# Patient Record
Sex: FEMALE | Race: WHITE | NOT HISPANIC OR LATINO
[De-identification: names, ages, dates, MRNs, and addresses within clinical notes are randomized per-mention and may not be internally consistent; named-entity substitution may affect disease eponyms.]

---

## 2017-02-13 ENCOUNTER — APPOINTMENT (OUTPATIENT)
Dept: RHEUMATOLOGY | Facility: CLINIC | Age: 52
End: 2017-02-13

## 2017-07-18 ENCOUNTER — OUTPATIENT (OUTPATIENT)
Dept: OUTPATIENT SERVICES | Facility: HOSPITAL | Age: 52
LOS: 1 days | End: 2017-07-18
Payer: COMMERCIAL

## 2017-07-18 ENCOUNTER — APPOINTMENT (OUTPATIENT)
Dept: RADIOLOGY | Facility: CLINIC | Age: 52
End: 2017-07-18

## 2017-07-18 DIAGNOSIS — Z00.8 ENCOUNTER FOR OTHER GENERAL EXAMINATION: ICD-10-CM

## 2017-07-18 PROCEDURE — 71046 X-RAY EXAM CHEST 2 VIEWS: CPT

## 2018-03-11 ENCOUNTER — APPOINTMENT (OUTPATIENT)
Dept: CT IMAGING | Facility: CLINIC | Age: 53
DRG: 114 | End: 2018-03-11
Payer: MEDICAID

## 2018-03-11 ENCOUNTER — HOSPITAL ENCOUNTER (INPATIENT)
Age: 53
LOS: 2 days | Discharge: HOME | DRG: 114 | End: 2018-03-13
Attending: SPECIALIST | Admitting: INTERNAL MEDICINE
Payer: MEDICAID

## 2018-03-11 DIAGNOSIS — L03.213 PRESEPTAL CELLULITIS OF LEFT LOWER EYELID: ICD-10-CM

## 2018-03-11 DIAGNOSIS — K04.7 DENTAL ABSCESS: Primary | ICD-10-CM

## 2018-03-11 PROBLEM — L02.91 ABSCESS: Status: ACTIVE | Noted: 2018-03-11

## 2018-03-11 LAB
ABSOLUTE EOS #: 0 K/UL (ref 0–0.4)
ABSOLUTE IMMATURE GRANULOCYTE: ABNORMAL K/UL (ref 0–0.3)
ABSOLUTE LYMPH #: 1.7 K/UL (ref 1–4.8)
ABSOLUTE MONO #: 0.7 K/UL (ref 0.1–1.2)
ANION GAP SERPL CALCULATED.3IONS-SCNC: 12 MMOL/L (ref 9–17)
BASOPHILS # BLD: 0 % (ref 0–2)
BASOPHILS ABSOLUTE: 0 K/UL (ref 0–0.2)
BUN BLDV-MCNC: 7 MG/DL (ref 6–20)
BUN/CREAT BLD: ABNORMAL (ref 9–20)
CALCIUM SERPL-MCNC: 9.3 MG/DL (ref 8.6–10.4)
CHLORIDE BLD-SCNC: 98 MMOL/L (ref 98–107)
CO2: 30 MMOL/L (ref 20–31)
CREAT SERPL-MCNC: 0.5 MG/DL (ref 0.5–0.9)
DIFFERENTIAL TYPE: ABNORMAL
EOSINOPHILS RELATIVE PERCENT: 0 % (ref 1–4)
GFR AFRICAN AMERICAN: >60 ML/MIN
GFR NON-AFRICAN AMERICAN: >60 ML/MIN
GFR SERPL CREATININE-BSD FRML MDRD: ABNORMAL ML/MIN/{1.73_M2}
GFR SERPL CREATININE-BSD FRML MDRD: ABNORMAL ML/MIN/{1.73_M2}
GLUCOSE BLD-MCNC: 119 MG/DL (ref 70–99)
HCT VFR BLD CALC: 42.7 % (ref 36–46)
HEMOGLOBIN: 14.2 G/DL (ref 12–16)
IMMATURE GRANULOCYTES: ABNORMAL %
LYMPHOCYTES # BLD: 19 % (ref 24–44)
MCH RBC QN AUTO: 32 PG (ref 26–34)
MCHC RBC AUTO-ENTMCNC: 33.4 G/DL (ref 31–37)
MCV RBC AUTO: 95.8 FL (ref 80–100)
MONOCYTES # BLD: 8 % (ref 2–11)
NRBC AUTOMATED: ABNORMAL PER 100 WBC
PDW BLD-RTO: 13.3 % (ref 12.5–15.4)
PLATELET # BLD: 249 K/UL (ref 140–450)
PLATELET ESTIMATE: ABNORMAL
PMV BLD AUTO: 8 FL (ref 6–12)
POTASSIUM SERPL-SCNC: 4 MMOL/L (ref 3.7–5.3)
RBC # BLD: 4.46 M/UL (ref 4–5.2)
RBC # BLD: ABNORMAL 10*6/UL
SEG NEUTROPHILS: 73 % (ref 36–66)
SEGMENTED NEUTROPHILS ABSOLUTE COUNT: 6.5 K/UL (ref 1.8–7.7)
SODIUM BLD-SCNC: 140 MMOL/L (ref 135–144)
WBC # BLD: 9 K/UL (ref 3.5–11)
WBC # BLD: ABNORMAL 10*3/UL

## 2018-03-11 PROCEDURE — 6360000002 HC RX W HCPCS: Performed by: EMERGENCY MEDICINE

## 2018-03-11 PROCEDURE — 96376 TX/PRO/DX INJ SAME DRUG ADON: CPT

## 2018-03-11 PROCEDURE — 1200000000 HC SEMI PRIVATE

## 2018-03-11 PROCEDURE — 96375 TX/PRO/DX INJ NEW DRUG ADDON: CPT

## 2018-03-11 PROCEDURE — 2500000003 HC RX 250 WO HCPCS: Performed by: SPECIALIST

## 2018-03-11 PROCEDURE — 2580000003 HC RX 258: Performed by: INTERNAL MEDICINE

## 2018-03-11 PROCEDURE — G0378 HOSPITAL OBSERVATION PER HR: HCPCS

## 2018-03-11 PROCEDURE — 6370000000 HC RX 637 (ALT 250 FOR IP): Performed by: SPECIALIST

## 2018-03-11 PROCEDURE — 96365 THER/PROPH/DIAG IV INF INIT: CPT

## 2018-03-11 PROCEDURE — 85025 COMPLETE CBC W/AUTO DIFF WBC: CPT

## 2018-03-11 PROCEDURE — 80048 BASIC METABOLIC PNL TOTAL CA: CPT

## 2018-03-11 PROCEDURE — 99284 EMERGENCY DEPT VISIT MOD MDM: CPT

## 2018-03-11 PROCEDURE — 6360000004 HC RX CONTRAST MEDICATION: Performed by: SPECIALIST

## 2018-03-11 PROCEDURE — 70487 CT MAXILLOFACIAL W/DYE: CPT

## 2018-03-11 PROCEDURE — 96367 TX/PROPH/DG ADDL SEQ IV INF: CPT

## 2018-03-11 PROCEDURE — 6360000002 HC RX W HCPCS: Performed by: INTERNAL MEDICINE

## 2018-03-11 PROCEDURE — 36415 COLL VENOUS BLD VENIPUNCTURE: CPT

## 2018-03-11 PROCEDURE — 6360000002 HC RX W HCPCS: Performed by: SPECIALIST

## 2018-03-11 PROCEDURE — 2580000003 HC RX 258: Performed by: SPECIALIST

## 2018-03-11 RX ORDER — ACETAMINOPHEN 325 MG/1
650 TABLET ORAL ONCE
Status: COMPLETED | OUTPATIENT
Start: 2018-03-11 | End: 2018-03-11

## 2018-03-11 RX ORDER — SODIUM CHLORIDE 0.9 % (FLUSH) 0.9 %
10 SYRINGE (ML) INJECTION EVERY 12 HOURS SCHEDULED
Status: DISCONTINUED | OUTPATIENT
Start: 2018-03-11 | End: 2018-03-13 | Stop reason: HOSPADM

## 2018-03-11 RX ORDER — HYDROCODONE BITARTRATE AND ACETAMINOPHEN 5; 325 MG/1; MG/1
1 TABLET ORAL EVERY 4 HOURS PRN
Status: DISCONTINUED | OUTPATIENT
Start: 2018-03-11 | End: 2018-03-13 | Stop reason: HOSPADM

## 2018-03-11 RX ORDER — CITALOPRAM 40 MG/1
40 TABLET ORAL DAILY
Status: ON HOLD | COMMUNITY
End: 2018-03-13 | Stop reason: HOSPADM

## 2018-03-11 RX ORDER — MORPHINE SULFATE 2 MG/ML
2 INJECTION, SOLUTION INTRAMUSCULAR; INTRAVENOUS
Status: DISCONTINUED | OUTPATIENT
Start: 2018-03-11 | End: 2018-03-13 | Stop reason: HOSPADM

## 2018-03-11 RX ORDER — CLINDAMYCIN PHOSPHATE 900 MG/50ML
900 INJECTION INTRAVENOUS ONCE
Status: COMPLETED | OUTPATIENT
Start: 2018-03-11 | End: 2018-03-11

## 2018-03-11 RX ORDER — KETOROLAC TROMETHAMINE 30 MG/ML
30 INJECTION, SOLUTION INTRAMUSCULAR; INTRAVENOUS ONCE
Status: COMPLETED | OUTPATIENT
Start: 2018-03-11 | End: 2018-03-11

## 2018-03-11 RX ORDER — POTASSIUM CHLORIDE 7.45 MG/ML
10 INJECTION INTRAVENOUS PRN
Status: DISCONTINUED | OUTPATIENT
Start: 2018-03-11 | End: 2018-03-13 | Stop reason: HOSPADM

## 2018-03-11 RX ORDER — NICOTINE 21 MG/24HR
1 PATCH, TRANSDERMAL 24 HOURS TRANSDERMAL DAILY PRN
Status: DISCONTINUED | OUTPATIENT
Start: 2018-03-11 | End: 2018-03-13 | Stop reason: HOSPADM

## 2018-03-11 RX ORDER — ONDANSETRON 2 MG/ML
4 INJECTION INTRAMUSCULAR; INTRAVENOUS EVERY 6 HOURS PRN
Status: DISCONTINUED | OUTPATIENT
Start: 2018-03-11 | End: 2018-03-13 | Stop reason: HOSPADM

## 2018-03-11 RX ORDER — ACETAMINOPHEN 325 MG/1
650 TABLET ORAL EVERY 4 HOURS PRN
Status: DISCONTINUED | OUTPATIENT
Start: 2018-03-11 | End: 2018-03-13 | Stop reason: HOSPADM

## 2018-03-11 RX ORDER — 0.9 % SODIUM CHLORIDE 0.9 %
70 INTRAVENOUS SOLUTION INTRAVENOUS ONCE
Status: COMPLETED | OUTPATIENT
Start: 2018-03-11 | End: 2018-03-11

## 2018-03-11 RX ORDER — CITALOPRAM 20 MG/1
40 TABLET ORAL DAILY
Status: DISCONTINUED | OUTPATIENT
Start: 2018-03-12 | End: 2018-03-13 | Stop reason: HOSPADM

## 2018-03-11 RX ORDER — MORPHINE SULFATE 4 MG/ML
4 INJECTION, SOLUTION INTRAMUSCULAR; INTRAVENOUS
Status: DISCONTINUED | OUTPATIENT
Start: 2018-03-11 | End: 2018-03-13 | Stop reason: HOSPADM

## 2018-03-11 RX ORDER — MAGNESIUM SULFATE 1 G/100ML
1 INJECTION INTRAVENOUS PRN
Status: DISCONTINUED | OUTPATIENT
Start: 2018-03-11 | End: 2018-03-13 | Stop reason: HOSPADM

## 2018-03-11 RX ORDER — BISACODYL 10 MG
10 SUPPOSITORY, RECTAL RECTAL DAILY PRN
Status: DISCONTINUED | OUTPATIENT
Start: 2018-03-11 | End: 2018-03-13 | Stop reason: HOSPADM

## 2018-03-11 RX ORDER — CITALOPRAM 20 MG/1
20 TABLET ORAL DAILY
Status: DISCONTINUED | OUTPATIENT
Start: 2018-03-12 | End: 2018-03-11

## 2018-03-11 RX ORDER — SODIUM CHLORIDE 0.9 % (FLUSH) 0.9 %
10 SYRINGE (ML) INJECTION PRN
Status: DISCONTINUED | OUTPATIENT
Start: 2018-03-11 | End: 2018-03-13 | Stop reason: HOSPADM

## 2018-03-11 RX ORDER — POTASSIUM CHLORIDE 20 MEQ/1
40 TABLET, EXTENDED RELEASE ORAL PRN
Status: DISCONTINUED | OUTPATIENT
Start: 2018-03-11 | End: 2018-03-13 | Stop reason: HOSPADM

## 2018-03-11 RX ORDER — POTASSIUM CHLORIDE 20MEQ/15ML
40 LIQUID (ML) ORAL PRN
Status: DISCONTINUED | OUTPATIENT
Start: 2018-03-11 | End: 2018-03-13 | Stop reason: HOSPADM

## 2018-03-11 RX ORDER — CITALOPRAM 20 MG/1
40 TABLET ORAL DAILY
COMMUNITY

## 2018-03-11 RX ORDER — SODIUM CHLORIDE 0.9 % (FLUSH) 0.9 %
10 SYRINGE (ML) INJECTION PRN
Status: DISCONTINUED | OUTPATIENT
Start: 2018-03-11 | End: 2018-03-11 | Stop reason: SDUPTHER

## 2018-03-11 RX ADMIN — KETOROLAC TROMETHAMINE 30 MG: 30 INJECTION, SOLUTION INTRAMUSCULAR at 20:06

## 2018-03-11 RX ADMIN — IOPAMIDOL 100 ML: 755 INJECTION, SOLUTION INTRAVENOUS at 14:46

## 2018-03-11 RX ADMIN — AMPICILLIN SODIUM AND SULBACTAM SODIUM 3 G: 2; 1 INJECTION, POWDER, FOR SOLUTION INTRAMUSCULAR; INTRAVENOUS at 23:52

## 2018-03-11 RX ADMIN — KETOROLAC TROMETHAMINE 30 MG: 30 INJECTION, SOLUTION INTRAMUSCULAR at 14:13

## 2018-03-11 RX ADMIN — SODIUM CHLORIDE 70 ML: 9 INJECTION, SOLUTION INTRAVENOUS at 14:47

## 2018-03-11 RX ADMIN — Medication 10 ML: at 23:08

## 2018-03-11 RX ADMIN — Medication 10 ML: at 14:47

## 2018-03-11 RX ADMIN — CLINDAMYCIN PHOSPHATE 900 MG: 18 INJECTION, SOLUTION INTRAMUSCULAR; INTRAVENOUS at 14:13

## 2018-03-11 RX ADMIN — ACETAMINOPHEN 650 MG: 325 TABLET ORAL at 18:59

## 2018-03-11 ASSESSMENT — PAIN DESCRIPTION - FREQUENCY
FREQUENCY: CONTINUOUS
FREQUENCY: CONTINUOUS

## 2018-03-11 ASSESSMENT — PAIN DESCRIPTION - LOCATION
LOCATION: FACE
LOCATION: TEETH
LOCATION: FACE

## 2018-03-11 ASSESSMENT — PAIN DESCRIPTION - DESCRIPTORS
DESCRIPTORS: THROBBING
DESCRIPTORS: ACHING

## 2018-03-11 ASSESSMENT — PAIN SCALES - GENERAL
PAINLEVEL_OUTOF10: 7
PAINLEVEL_OUTOF10: 7
PAINLEVEL_OUTOF10: 10
PAINLEVEL_OUTOF10: 0
PAINLEVEL_OUTOF10: 7
PAINLEVEL_OUTOF10: 0
PAINLEVEL_OUTOF10: 6

## 2018-03-11 ASSESSMENT — ENCOUNTER SYMPTOMS
SORE THROAT: 0
FACIAL SWELLING: 1
WHEEZING: 0
SHORTNESS OF BREATH: 0
TROUBLE SWALLOWING: 0

## 2018-03-11 ASSESSMENT — PAIN DESCRIPTION - ORIENTATION
ORIENTATION: LEFT;UPPER
ORIENTATION: LEFT

## 2018-03-11 ASSESSMENT — PAIN DESCRIPTION - PAIN TYPE: TYPE: ACUTE PAIN

## 2018-03-11 ASSESSMENT — PAIN SCALES - WONG BAKER: WONGBAKER_NUMERICALRESPONSE: 0

## 2018-03-11 ASSESSMENT — PAIN DESCRIPTION - PROGRESSION: CLINICAL_PROGRESSION: RAPIDLY IMPROVING

## 2018-03-11 NOTE — Clinical Note
Patient Class: Observation [104]   REQUIRED: Diagnosis: Dental abscess [866865]   Estimated Length of Stay: Estimated stay of less than 2 midnights   Future Attending Provider: Mac Vega [[id number]]   Telemetry Bed Required?: No

## 2018-03-11 NOTE — ED NOTES
Access calls back with other transport ETA. [facility] is [phone], [facility] not available and [facility] does not accept her insurance.      Kiara Camara, RN  03/11/18 Alva Rodríguez

## 2018-03-11 NOTE — ED PROVIDER NOTES
[address]  Phone: [phone]      Pt Name: Katey Rosen  MRN: [id number]  [place] 1965  Date of evaluation: 3/11/2018      CHIEF COMPLAINT       Chief Complaint   Patient presents with    Dental Pain     left upper         HISTORY OF PRESENT ILLNESS    Katey Rosen is a 48 y.o. female who presents   Chief Complaint   Patient presents with    Dental Pain     left upper   . 29-year-old female patient presents to the emergency department complaining of pain and swelling in the left side of the face as well as left upper jaw toothache. Patient started having mild toothache 2 days ago for which she took some Tylenol with relief. She started having swelling in the left side of the face on Friday night and swelling has progressively gotten worse associated with pain. She denies any fever but she had some chills. She had Keflex at home and she took 4 tablets yesterday 500 mg each and 2 tablets yesterday without much relief. She denies any sore throat or earache. She denies any difficulty swallowing or difficulty in breathing. She has made appointment with her dentist on March 31, 2018. Pain has gotten worse associated with swelling and patient is worried about abscess and thus she comes to the emergency department. Pain is worse with chewing on the solid food and drinking warm fluids. There are no relieving factors. Patient is ex-smoker and quit smoking 7 years ago. Used to smoke one pack per day for 28 years before that. REVIEW OF SYSTEMS       Review of Systems   Constitutional: Positive for chills. Negative for fever. HENT: Positive for dental problem and facial swelling. Negative for sore throat and trouble swallowing. Respiratory: Negative for shortness of breath and wheezing. Neurological: Negative for dizziness, speech difficulty, light-headedness and headaches. All other systems reviewed and are negative.          PAST MEDICAL HISTORY    has a past medical history of Anxiety. SURGICAL HISTORY      has a past surgical history that includes Ectopic pregnancy surgery. CURRENT MEDICATIONS       Current Discharge Medication List      CONTINUE these medications which have NOT CHANGED    Details   Conj Estrog-Medroxyprogest Ace (PREMPRO PO) Take by mouth daily      citalopram (CELEXA) 20 MG tablet Take 40 mg by mouth daily              ALLERGIES     has No Known Allergies. FAMILY HISTORY     indicated that the status of her father is unknown.      family history includes Esophageal Cancer in her father. SOCIAL HISTORY      reports that she has quit smoking. She has never used smokeless tobacco. She reports that she drinks alcohol. She reports that she does not use drugs. PHYSICAL EXAM     INITIAL VITALS:  height is 5' 6\" (1.676 m) and weight is 84.1 kg (185 lb 6.4 oz). Her oral temperature is 97.9 °F (36.6 °C). Her blood pressure is 106/57 (abnormal) and her pulse is 77. Her respiration is 18 and oxygen saturation is 94%. Physical Exam   Constitutional: She is oriented to person, place, and time. She appears well-developed and well-nourished. HENT:   Head: Normocephalic and atraumatic. Head is without raccoon's eyes and without Aldridge's sign. Nose: Nose normal.   Mouth/Throat: Oropharynx is clear and moist and mucous membranes are normal. No trismus in the jaw. Patient has moderate to large edema in the left side of the face in the maxillary region which extends to the left lower eyelid. There is no trismus. Oropharynx is clear. There is no cervical lymphadenopathy. Tympanic membranes are clear. No evidence of Mike's angina at all. Eyes: EOM are normal. Pupils are equal, round, and reactive to light. Neck: Normal range of motion. Neck supple. Cardiovascular: Normal rate, regular rhythm, normal heart sounds and intact distal pulses. No murmur heard.   Pulmonary/Chest: Effort normal and breath sounds

## 2018-03-11 NOTE — ED NOTES
Attempted to advise pt of wait time for admission. Pt remains asleep on cart. Appears comfortable.       Shilpa Saucedo RN  03/11/18 3245

## 2018-03-12 LAB
ANION GAP SERPL CALCULATED.3IONS-SCNC: 11 MMOL/L (ref 9–17)
BUN BLDV-MCNC: 11 MG/DL (ref 6–20)
BUN/CREAT BLD: 19 (ref 9–20)
CALCIUM SERPL-MCNC: 8.8 MG/DL (ref 8.6–10.4)
CHLORIDE BLD-SCNC: 101 MMOL/L (ref 98–107)
CO2: 30 MMOL/L (ref 20–31)
CREAT SERPL-MCNC: 0.58 MG/DL (ref 0.5–0.9)
GFR AFRICAN AMERICAN: >60 ML/MIN
GFR NON-AFRICAN AMERICAN: >60 ML/MIN
GFR SERPL CREATININE-BSD FRML MDRD: NORMAL ML/MIN/{1.73_M2}
GFR SERPL CREATININE-BSD FRML MDRD: NORMAL ML/MIN/{1.73_M2}
GLUCOSE BLD-MCNC: 99 MG/DL (ref 70–99)
HCT VFR BLD CALC: 40.3 % (ref 36–46)
HEMOGLOBIN: 13.2 G/DL (ref 12–16)
INR BLD: 0.9
MCH RBC QN AUTO: 31.5 PG (ref 26–34)
MCHC RBC AUTO-ENTMCNC: 32.7 G/DL (ref 31–37)
MCV RBC AUTO: 96.4 FL (ref 80–100)
NRBC AUTOMATED: NORMAL PER 100 WBC
PDW BLD-RTO: 13.3 % (ref 11.5–14.5)
PLATELET # BLD: 271 K/UL (ref 130–400)
PMV BLD AUTO: 7.7 FL (ref 6–12)
POTASSIUM SERPL-SCNC: 3.9 MMOL/L (ref 3.7–5.3)
PROTHROMBIN TIME: 9.8 SEC (ref 9.7–11.6)
RBC # BLD: 4.18 M/UL (ref 4–5.2)
SODIUM BLD-SCNC: 142 MMOL/L (ref 135–144)
WBC # BLD: 7.4 K/UL (ref 3.5–11)

## 2018-03-12 PROCEDURE — 6370000000 HC RX 637 (ALT 250 FOR IP): Performed by: INTERNAL MEDICINE

## 2018-03-12 PROCEDURE — 6370000000 HC RX 637 (ALT 250 FOR IP): Performed by: NURSE PRACTITIONER

## 2018-03-12 PROCEDURE — 1200000000 HC SEMI PRIVATE

## 2018-03-12 PROCEDURE — 85610 PROTHROMBIN TIME: CPT

## 2018-03-12 PROCEDURE — 85027 COMPLETE CBC AUTOMATED: CPT

## 2018-03-12 PROCEDURE — 96375 TX/PRO/DX INJ NEW DRUG ADDON: CPT

## 2018-03-12 PROCEDURE — 80048 BASIC METABOLIC PNL TOTAL CA: CPT

## 2018-03-12 PROCEDURE — 99222 1ST HOSP IP/OBS MODERATE 55: CPT | Performed by: INTERNAL MEDICINE

## 2018-03-12 PROCEDURE — 96376 TX/PRO/DX INJ SAME DRUG ADON: CPT

## 2018-03-12 PROCEDURE — 6360000002 HC RX W HCPCS: Performed by: INTERNAL MEDICINE

## 2018-03-12 PROCEDURE — 2580000003 HC RX 258: Performed by: INTERNAL MEDICINE

## 2018-03-12 PROCEDURE — G0378 HOSPITAL OBSERVATION PER HR: HCPCS

## 2018-03-12 PROCEDURE — 36415 COLL VENOUS BLD VENIPUNCTURE: CPT

## 2018-03-12 RX ADMIN — AMPICILLIN SODIUM AND SULBACTAM SODIUM 3 G: 2; 1 INJECTION, POWDER, FOR SOLUTION INTRAMUSCULAR; INTRAVENOUS at 12:05

## 2018-03-12 RX ADMIN — Medication 10 ML: at 20:32

## 2018-03-12 RX ADMIN — Medication 10 ML: at 09:10

## 2018-03-12 RX ADMIN — AMPICILLIN SODIUM AND SULBACTAM SODIUM 3 G: 2; 1 INJECTION, POWDER, FOR SOLUTION INTRAMUSCULAR; INTRAVENOUS at 17:25

## 2018-03-12 RX ADMIN — AMPICILLIN SODIUM AND SULBACTAM SODIUM 3 G: 2; 1 INJECTION, POWDER, FOR SOLUTION INTRAMUSCULAR; INTRAVENOUS at 23:33

## 2018-03-12 RX ADMIN — AMPICILLIN SODIUM AND SULBACTAM SODIUM 3 G: 2; 1 INJECTION, POWDER, FOR SOLUTION INTRAMUSCULAR; INTRAVENOUS at 05:34

## 2018-03-12 RX ADMIN — HYDROCODONE BITARTRATE AND ACETAMINOPHEN 1 TABLET: 5; 325 TABLET ORAL at 09:09

## 2018-03-12 RX ADMIN — CITALOPRAM HYDROBROMIDE 40 MG: 20 TABLET ORAL at 20:31

## 2018-03-12 RX ADMIN — CITALOPRAM HYDROBROMIDE 40 MG: 20 TABLET ORAL at 00:46

## 2018-03-12 RX ADMIN — MORPHINE SULFATE 4 MG: 4 INJECTION, SOLUTION INTRAMUSCULAR; INTRAVENOUS at 12:11

## 2018-03-12 ASSESSMENT — PAIN DESCRIPTION - ONSET
ONSET: ON-GOING
ONSET: ON-GOING

## 2018-03-12 ASSESSMENT — PAIN DESCRIPTION - PAIN TYPE
TYPE: ACUTE PAIN
TYPE: ACUTE PAIN

## 2018-03-12 ASSESSMENT — PAIN DESCRIPTION - FREQUENCY
FREQUENCY: CONTINUOUS
FREQUENCY: CONTINUOUS

## 2018-03-12 ASSESSMENT — PAIN SCALES - GENERAL
PAINLEVEL_OUTOF10: 7
PAINLEVEL_OUTOF10: 5
PAINLEVEL_OUTOF10: 4
PAINLEVEL_OUTOF10: 5

## 2018-03-12 ASSESSMENT — PAIN DESCRIPTION - LOCATION
LOCATION: FACE
LOCATION: FACE

## 2018-03-12 ASSESSMENT — PAIN DESCRIPTION - DESCRIPTORS
DESCRIPTORS: PENETRATING;ACHING
DESCRIPTORS: PRESSURE;ACHING

## 2018-03-12 ASSESSMENT — PAIN DESCRIPTION - ORIENTATION
ORIENTATION: LEFT
ORIENTATION: LEFT

## 2018-03-12 ASSESSMENT — PAIN DESCRIPTION - PROGRESSION: CLINICAL_PROGRESSION: GRADUALLY IMPROVING

## 2018-03-12 NOTE — H&P
Problem  Dental abscess    Active Hospital Problems    Diagnosis Date Noted    Dental abscess [K04.7] 03/11/2018       Plan:     Patient status Admit as inpatient in the  Med/Surge    1. Iv unasyn to continue  2. Oral surgery consult-will see if they are coming here or if they would prefer to see her in their office  3. If it is to be in office, will try and coordinate discharge from here to go straight there for eval  4. Pain control  5. Likely dc 24h to then see oral surgery  6. D/w family    Consultations:   IP CONSULT TO ORAL SURGERY     Patient is admitted as inpatient status because of co-morbidities listed above, severity of signs and symptoms as outlined, requirement for current medical therapies and most importantly because of direct risk to patient if care not provided in a hospital setting. Albaro Lopez DO  3/12/2018  1:09 PM    Copy sent to Dr. Couch primary care provider on file.

## 2018-03-12 NOTE — PROGRESS NOTES
Call received from Dr. Mauro Gong office, patient can be seen tomorrow at 0900. Information added to discharge instructions.

## 2018-03-12 NOTE — CARE COORDINATION
Started feeling pain and swelling lt side of face Friday and progressively got worse. Transferred from [address] [place] [address] by ambulance yesterday. Plan is to continue IV ATB until tomorrow and go straight to oral surgeon  when discharged. Brandyn Lacey lead nurse is trying to get appt with Dr. Summer Julien.         Electronically signed by Eva Leach RN on 3/12/18 at 1:37 PM

## 2018-03-12 NOTE — FLOWSHEET NOTE
visited patient while rounding on the unit. Patient was open to visit but denied any spiritualor emotional concerns. Patient expressed gratitude for the visit. Chaplains are available for further emotional or spiritual concerns.      03/12/18 1315   Encounter Summary   Services provided to: Patient   Referral/Consult From: Rounding   Support System Unknown   Continue Visiting (3/12/18)   Complexity of Encounter Low   Length of Encounter 15 minutes   Spiritual Assessment Completed Yes   Routine   Type Initial   Assessment Approachable   Intervention Active listening;Explored feelings, thoughts, concerns;Explored coping resources;Nurtured hope   Outcome Expressed gratitude

## 2018-03-12 NOTE — PROGRESS NOTES
Pt received from [facility] via EMS ~2245 in stable condition. Oriented to room and call bell, reviewed POC.   Pt indicates understanding

## 2018-03-12 NOTE — PLAN OF CARE
Problem: Infection:  Goal: Will remain free from infection  Will remain free from infection   Outcome: Ongoing  Left side of face swollen with redness beneath eye Remains painful but patient verbalizes swelling has decreased gissell around eye so she can see better    Problem: Pain:  Goal: Pain level will decrease  Pain level will decrease   Outcome: Ongoing  Less swelling to left side of face but still painful Norco was not effective and Morphine 4mg given

## 2018-03-12 NOTE — PROGRESS NOTES
Call placed to Dr. Delmi Acosta office to set up patient to be seen after discharge. Patient's insurance is not cover at Dr. Delmi Acosta office. Message passed to consult Dr. Sandip Patel to see patient here at [facility]. Will try to determine if patient's insurance is accepted at another office.

## 2018-03-13 VITALS
HEART RATE: 77 BPM | RESPIRATION RATE: 18 BRPM | OXYGEN SATURATION: 94 % | HEIGHT: 66 IN | DIASTOLIC BLOOD PRESSURE: 57 MMHG | SYSTOLIC BLOOD PRESSURE: 106 MMHG | TEMPERATURE: 97.9 F | BODY MASS INDEX: 29.8 KG/M2 | WEIGHT: 185.4 LBS

## 2018-03-13 PROCEDURE — 2580000003 HC RX 258: Performed by: INTERNAL MEDICINE

## 2018-03-13 PROCEDURE — 99232 SBSQ HOSP IP/OBS MODERATE 35: CPT | Performed by: INTERNAL MEDICINE

## 2018-03-13 PROCEDURE — 96376 TX/PRO/DX INJ SAME DRUG ADON: CPT

## 2018-03-13 PROCEDURE — G0378 HOSPITAL OBSERVATION PER HR: HCPCS

## 2018-03-13 PROCEDURE — 6360000002 HC RX W HCPCS: Performed by: INTERNAL MEDICINE

## 2018-03-13 RX ORDER — HYDROCODONE BITARTRATE AND ACETAMINOPHEN 5; 325 MG/1; MG/1
1 TABLET ORAL EVERY 4 HOURS PRN
Qty: 40 TABLET | Refills: 0 | Status: SHIPPED | OUTPATIENT
Start: 2018-03-13 | End: 2018-03-20

## 2018-03-13 RX ORDER — AMPICILLIN 500 MG/1
500 CAPSULE ORAL 4 TIMES DAILY
Qty: 28 CAPSULE | Refills: 0 | Status: SHIPPED | OUTPATIENT
Start: 2018-03-13 | End: 2018-03-20

## 2018-03-13 RX ADMIN — AMPICILLIN SODIUM AND SULBACTAM SODIUM 3 G: 2; 1 INJECTION, POWDER, FOR SOLUTION INTRAMUSCULAR; INTRAVENOUS at 05:30

## 2018-03-13 NOTE — DISCHARGE SUMMARY
dental infection with cortical breakthrough and adjacent abscess collection and inflammatory changes, including left-sided preseptal soft tissue swelling. No post orbital cellulitic change. Consultations:    Consults:     Final Specialist Recommendations/Findings:   IP CONSULT TO ORAL SURGERY      The patient was seen and examined on day of discharge and this discharge summary is in conjunction with any daily progress note from day of discharge. Discharge plan:     Disposition: Home    Physician Follow Up:     Sara Angélica Andres, [address]  Selvin james [address] [place] [address]  [phone]    Go on 3/13/2018  Appointment scheduled for 9:00 AM       Requiring Further Evaluation/Follow Up POST HOSPITALIZATION/Incidental Findings: oral surgery appt    Diet: regular diet    Activity: As tolerated    Instructions to Patient: go straight to oral surgeon    Discharge Medications:      Medication List      START taking these medications    ampicillin 500 MG capsule  Commonly known as:  PRINCIPEN  Take 1 capsule by mouth 4 times daily for 7 days     HYDROcodone-acetaminophen 5-325 MG per tablet  Commonly known as:  NORCO  Take 1 tablet by mouth every 4 hours as needed for Pain for up to 7 days. CHANGE how you take these medications    citalopram 20 MG tablet  Commonly known as:  CELEXA  What changed:  Another medication with the same name was removed. Continue taking this medication, and follow the directions you see here. CONTINUE taking these medications    PREMPRO PO           Where to Get Your Medications      You can get these medications from any pharmacy    Bring a paper prescription for each of these medications  · ampicillin 500 MG capsule  · HYDROcodone-acetaminophen 5-325 MG per tablet         Time Spent on discharge is  20 mins in patient examination, evaluation, counseling as well as medication reconciliation, prescriptions for required medications, discharge plan and follow up.     Electronically

## 2018-03-13 NOTE — PROGRESS NOTES
[facility]    Progress Note    3/13/2018    7:19 AM    Name:   Shreya Ovalles  MRN:     [id number]     Acct:      [de-identified]   Room:   2025/2025-01   Day:  2  Admit Date:  3/11/2018  1:04 PM    PCP:   No primary care provider on file. Code Status:  Full Code    Subjective:     C/C:   Chief Complaint   Patient presents with    Dental Pain     left upper     Interval History Status: improved. Face feels better and is less swollen  She is very thankful with how much we have helped her    Brief History:     The patient is a 48 y.o.   female who presents with Dental Pain (left upper)   and she is admitted to the hospital for the management of  Swelling and pain left face. She noticed a twinge of pain this past Thursday on the left side while using a water pik; the following day she could feel pulsating there and the left side of face became more swollen. By Saturday the swelling had become a lot worse. She had leftover keflex at her home and started taking that but on Sunday when she awakened the pain was much worse and her left eye was swollen shut. Review of Systems:     Constitutional:  negative for chills, fevers, sweats  Respiratory:  negative for cough, dyspnea on exertion, shortness of breath, wheezing  Cardiovascular:  negative for chest pain, chest pressure/discomfort, lower extremity edema, palpitations  Gastrointestinal:  negative for abdominal pain, constipation, diarrhea, nausea, vomiting  Neurological:  negative for dizziness, headache    Medications:      Allergies:  No Known Allergies    Current Meds:   Scheduled Meds:    estrogen (conjugated)-medroxyprogesterone  1 tablet Oral Daily    sodium chloride flush  10 mL Intravenous 2 times per day    enoxaparin  40 mg Subcutaneous Daily    ampicillin-sulbactam  3 g Intravenous Q6H    citalopram  40 mg Oral Daily     Continuous Infusions:   PRN Meds: sodium chloride flush,

## 2019-10-15 ENCOUNTER — APPOINTMENT (OUTPATIENT)
Dept: NEUROLOGY | Facility: CLINIC | Age: 54
End: 2019-10-15

## 2019-10-18 ENCOUNTER — APPOINTMENT (OUTPATIENT)
Dept: RADIOLOGY | Facility: CLINIC | Age: 54
End: 2019-10-18
Payer: MEDICAID

## 2019-10-18 ENCOUNTER — OUTPATIENT (OUTPATIENT)
Dept: OUTPATIENT SERVICES | Facility: HOSPITAL | Age: 54
LOS: 1 days | End: 2019-10-18
Payer: COMMERCIAL

## 2019-10-18 DIAGNOSIS — Z00.8 ENCOUNTER FOR OTHER GENERAL EXAMINATION: ICD-10-CM

## 2019-10-18 PROCEDURE — 72110 X-RAY EXAM L-2 SPINE 4/>VWS: CPT

## 2019-10-18 PROCEDURE — 73521 X-RAY EXAM HIPS BI 2 VIEWS: CPT | Mod: 26

## 2019-10-18 PROCEDURE — 72110 X-RAY EXAM L-2 SPINE 4/>VWS: CPT | Mod: 26

## 2019-10-18 PROCEDURE — 73521 X-RAY EXAM HIPS BI 2 VIEWS: CPT

## 2019-11-19 ENCOUNTER — APPOINTMENT (OUTPATIENT)
Dept: MRI IMAGING | Facility: CLINIC | Age: 54
End: 2019-11-19

## 2019-11-20 ENCOUNTER — APPOINTMENT (OUTPATIENT)
Dept: ORTHOPEDIC SURGERY | Facility: CLINIC | Age: 54
End: 2019-11-20

## 2022-04-07 ENCOUNTER — APPOINTMENT (OUTPATIENT)
Dept: CARDIOLOGY | Facility: CLINIC | Age: 57
End: 2022-04-07

## 2022-05-03 ENCOUNTER — TRANSCRIPTION ENCOUNTER (OUTPATIENT)
Age: 57
End: 2022-05-03

## 2022-05-03 ENCOUNTER — APPOINTMENT (OUTPATIENT)
Dept: INTERNAL MEDICINE | Facility: CLINIC | Age: 57
End: 2022-05-03
Payer: COMMERCIAL

## 2022-05-03 VITALS
WEIGHT: 180 LBS | HEIGHT: 66 IN | BODY MASS INDEX: 28.93 KG/M2 | RESPIRATION RATE: 12 BRPM | SYSTOLIC BLOOD PRESSURE: 118 MMHG | DIASTOLIC BLOOD PRESSURE: 71 MMHG | OXYGEN SATURATION: 95 % | HEART RATE: 121 BPM | TEMPERATURE: 97.3 F

## 2022-05-03 PROCEDURE — 99204 OFFICE O/P NEW MOD 45 MIN: CPT

## 2022-05-03 RX ORDER — AMITRIPTYLINE HYDROCHLORIDE 50 MG/1
50 TABLET, FILM COATED ORAL
Refills: 0 | Status: ACTIVE | COMMUNITY

## 2022-05-03 NOTE — ASSESSMENT
[FreeTextEntry1] : \par \par HTN\par cont metoprolol 100mg daily-renewed today\par cont Triplixam ( perindopril 10mg, Amlodipine 10mg, Indapamide 2.5mg)-pt has medicine from [place] country \par cardiology referral\par \par MS\par cont current meds\par follows with neurology\par \par does not want blood work today - says she had recent blood work

## 2022-05-03 NOTE — HISTORY OF PRESENT ILLNESS
[de-identified] : \par Ms. WIN MIRANDA is a 57 year old female, with hx of MS, HTN, presents for initial evaluation / establish care\par She is following with neurologist at [facility] in [place] \par Denies SOB, chest pain, abdominal pain, N/V/D, leg swelling, headache, dizziness \par Offers no complaints\par

## 2022-05-03 NOTE — PHYSICAL EXAM
[Normal Sclera/Conjunctiva] : normal sclera/conjunctiva [Normal Outer Ear/Nose] : the outer ears and nose were normal in appearance [No JVD] : no jugular venous distention [No Lymphadenopathy] : no lymphadenopathy [Normal] : normal rate, regular rhythm, normal S1 and S2 and no murmur heard [No Edema] : there was no peripheral edema [Soft] : abdomen soft [Non Tender] : non-tender [Non-distended] : non-distended [Normal Anterior Cervical Nodes] : no anterior cervical lymphadenopathy [No CVA Tenderness] : no CVA  tenderness [No Joint Swelling] : no joint swelling [No Rash] : no rash [Speech Grossly Normal] : speech grossly normal [Alert and Oriented x3] : oriented to person, place, and time [Normal Insight/Judgement] : insight and judgment were intact [de-identified] : ambulates with walker, There is a generalized mild tremor

## 2022-05-06 ENCOUNTER — APPOINTMENT (OUTPATIENT)
Dept: CARDIOLOGY | Facility: CLINIC | Age: 57
End: 2022-05-06
Payer: COMMERCIAL

## 2022-05-06 ENCOUNTER — NON-APPOINTMENT (OUTPATIENT)
Age: 57
End: 2022-05-06

## 2022-05-06 VITALS
SYSTOLIC BLOOD PRESSURE: 101 MMHG | BODY MASS INDEX: 28.93 KG/M2 | HEART RATE: 116 BPM | HEIGHT: 66 IN | OXYGEN SATURATION: 97 % | DIASTOLIC BLOOD PRESSURE: 69 MMHG | WEIGHT: 180 LBS | RESPIRATION RATE: 12 BRPM

## 2022-05-06 DIAGNOSIS — R60.0 LOCALIZED EDEMA: ICD-10-CM

## 2022-05-06 PROCEDURE — 93000 ELECTROCARDIOGRAM COMPLETE: CPT

## 2022-05-06 PROCEDURE — 99204 OFFICE O/P NEW MOD 45 MIN: CPT

## 2022-05-06 NOTE — HISTORY OF PRESENT ILLNESS
[FreeTextEntry1] : Yessi 58yo lady with a PMH of MS (not on meds at the moment but seeing neuro and starting soon), and HTN.\par Meds from [place]:\par Metoprolol succ 100AM / 50PM.\par Triplixan: \par -Perindopril\par -Indapamide\par -Amlodipine\par \par SBPs initially very variable, but seem to have settled with this cocktail.\par No UE and LE edema however.\par EKG sinus tach 104bpm.... tachy at baseline\par Echo and stress test normal in [place] 2 months ago.

## 2022-05-06 NOTE — DISCUSSION/SUMMARY
[FreeTextEntry1] : Yessi 56yo lady with a PMH of MS (not on meds at the moment but seeing neuro and starting soon), and HTN.\par Meds from [place]:\par Metoprolol succ 100AM / 50PM.\par Triplixan: \par -Perindopril\par -Indapamide\par -Amlodipine\par \par SBPs initially very variable, but seem to have settled with this cocktail.\par No UE and LE edema however.\par EKG sinus tach 104bpm.... tachy at baseline\par Echo and stress test normal in [place] 2 months ago. \par \par -switch meds as some not available in the [place].  With tachycardia, will up BBs.\par D/C amlodipine 2/2 edema\par Cont loop diuretic but switch to HCTZ\par ACE if needed\par \par Metoprolol succ 100AM / 50PM -> switch to 100BID.\par Triplixan:\par -Perindopril -> switch to lisinopril 20mg IF needed\par -Indapamide -> switch to HCTZ 25mg daily \par -Amlodipine -> d/c\par \par Will email with BP log\par

## 2022-07-17 ENCOUNTER — NON-APPOINTMENT (OUTPATIENT)
Age: 57
End: 2022-07-17

## 2022-08-22 ENCOUNTER — EMERGENCY (EMERGENCY)
Facility: HOSPITAL | Age: 57
LOS: 1 days | Discharge: HOME | End: 2022-08-22
Attending: EMERGENCY MEDICINE
Payer: SELF-PAY

## 2022-08-22 VITALS
TEMPERATURE: 98 F | DIASTOLIC BLOOD PRESSURE: 73 MMHG | HEART RATE: 124 BPM | RESPIRATION RATE: 18 BRPM | OXYGEN SATURATION: 94 % | SYSTOLIC BLOOD PRESSURE: 143 MMHG

## 2022-08-22 PROCEDURE — G1004: CPT

## 2022-08-22 PROCEDURE — 70450 CT HEAD/BRAIN W/O DYE: CPT | Mod: 26,MG

## 2022-08-22 PROCEDURE — 72125 CT NECK SPINE W/O DYE: CPT | Mod: 26,MG

## 2022-08-22 PROCEDURE — 99285 EMERGENCY DEPT VISIT HI MDM: CPT

## 2022-08-22 PROCEDURE — 71045 X-RAY EXAM CHEST 1 VIEW: CPT | Mod: 26

## 2022-08-22 RX ORDER — KETOROLAC TROMETHAMINE 30 MG/ML
15 SYRINGE (ML) INJECTION ONCE
Refills: 0 | Status: DISCONTINUED | OUTPATIENT
Start: 2022-08-22 | End: 2022-08-23

## 2022-08-22 RX ORDER — LIDOCAINE 4 G/100G
1 CREAM TOPICAL ONCE
Refills: 0 | Status: COMPLETED | OUTPATIENT
Start: 2022-08-22 | End: 2022-08-22

## 2022-08-22 RX ORDER — DIAZEPAM 5 MG
5 TABLET ORAL ONCE
Refills: 0 | Status: DISCONTINUED | OUTPATIENT
Start: 2022-08-22 | End: 2022-08-23
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
## 2022-08-22 NOTE — ED PROVIDER NOTE - PATIENT PORTAL LINK FT
You can access the FollowMyHealth Patient Portal offered by [facility] by registering at the following website: [email or web address]. By joining [employer]’s FollowMyHealth portal, you will also be able to view your health information using other applications (apps) compatible with our system.

## 2022-08-23 VITALS
SYSTOLIC BLOOD PRESSURE: 140 MMHG | TEMPERATURE: 98 F | RESPIRATION RATE: 18 BRPM | HEART RATE: 107 BPM | DIASTOLIC BLOOD PRESSURE: 76 MMHG | OXYGEN SATURATION: 94 %

## 2022-08-23 PROCEDURE — 71045 X-RAY EXAM CHEST 1 VIEW: CPT

## 2022-08-23 PROCEDURE — 99284 EMERGENCY DEPT VISIT MOD MDM: CPT | Mod: 25

## 2022-08-23 PROCEDURE — 70450 CT HEAD/BRAIN W/O DYE: CPT | Mod: MG

## 2022-08-23 PROCEDURE — 96374 THER/PROPH/DIAG INJ IV PUSH: CPT

## 2022-08-23 PROCEDURE — G1004: CPT

## 2022-08-23 PROCEDURE — 72125 CT NECK SPINE W/O DYE: CPT | Mod: MG

## 2022-08-23 RX ORDER — DIAZEPAM 5 MG
5 TABLET ORAL ONCE
Refills: 0 | Status: DISCONTINUED | OUTPATIENT
Start: 2022-08-23 | End: 2022-08-23

## 2022-08-23 RX ADMIN — Medication 15 MILLIGRAM(S): at 01:21

## 2022-08-23 RX ADMIN — Medication 15 MILLIGRAM(S): at 01:01

## 2022-08-23 RX ADMIN — LIDOCAINE 1 PATCH: 4 CREAM TOPICAL at 00:52

## 2022-08-23 RX ADMIN — Medication 5 MILLIGRAM(S): at 00:59
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
## 2023-05-19 ENCOUNTER — INPATIENT (INPATIENT)
Facility: HOSPITAL | Age: 58
LOS: 19 days | Discharge: HOME | End: 2023-06-08
Attending: HOSPITALIST | Admitting: HOSPITALIST
Payer: MEDICAID

## 2023-05-19 VITALS
HEART RATE: 118 BPM | OXYGEN SATURATION: 97 % | RESPIRATION RATE: 15 BRPM | SYSTOLIC BLOOD PRESSURE: 83 MMHG | DIASTOLIC BLOOD PRESSURE: 52 MMHG | TEMPERATURE: 100 F

## 2023-05-19 LAB
ALBUMIN SERPL ELPH-MCNC: 4.3 G/DL — SIGNIFICANT CHANGE UP (ref 3.3–5)
ALP SERPL-CCNC: 90 U/L — SIGNIFICANT CHANGE UP (ref 40–120)
ALT FLD-CCNC: 696 U/L — HIGH (ref 4–33)
ANION GAP SERPL CALC-SCNC: 19 MMOL/L — HIGH (ref 7–14)
APPEARANCE UR: ABNORMAL
APTT BLD: 34.5 SEC — SIGNIFICANT CHANGE UP (ref 27–36.3)
AST SERPL-CCNC: 778 U/L — HIGH (ref 4–32)
BACTERIA # UR AUTO: ABNORMAL
BASE EXCESS BLDV CALC-SCNC: 6.9 MMOL/L — HIGH (ref -2–3)
BASOPHILS # BLD AUTO: 0.01 K/UL — SIGNIFICANT CHANGE UP (ref 0–0.2)
BASOPHILS NFR BLD AUTO: 0.2 % — SIGNIFICANT CHANGE UP (ref 0–2)
BILIRUB SERPL-MCNC: 0.6 MG/DL — SIGNIFICANT CHANGE UP (ref 0.2–1.2)
BILIRUB UR-MCNC: NEGATIVE — SIGNIFICANT CHANGE UP
BLOOD GAS VENOUS COMPREHENSIVE RESULT: SIGNIFICANT CHANGE UP
BUN SERPL-MCNC: 24 MG/DL — HIGH (ref 7–23)
CALCIUM SERPL-MCNC: 9 MG/DL — SIGNIFICANT CHANGE UP (ref 8.4–10.5)
CHLORIDE BLDV-SCNC: 88 MMOL/L — LOW (ref 96–108)
CHLORIDE SERPL-SCNC: 83 MMOL/L — LOW (ref 98–107)
CO2 BLDV-SCNC: 33.4 MMOL/L — HIGH (ref 22–26)
CO2 SERPL-SCNC: 25 MMOL/L — SIGNIFICANT CHANGE UP (ref 22–31)
COLOR SPEC: YELLOW — SIGNIFICANT CHANGE UP
CREAT SERPL-MCNC: 1.7 MG/DL — HIGH (ref 0.5–1.3)
DIFF PNL FLD: ABNORMAL
EGFR: 35 ML/MIN/1.73M2 — LOW
EOSINOPHIL # BLD AUTO: 0 K/UL — SIGNIFICANT CHANGE UP (ref 0–0.5)
EOSINOPHIL NFR BLD AUTO: 0 % — SIGNIFICANT CHANGE UP (ref 0–6)
EPI CELLS # UR: >27 /HPF — HIGH (ref 0–5)
GAS PNL BLDV: 124 MMOL/L — LOW (ref 136–145)
GAS PNL BLDV: SIGNIFICANT CHANGE UP
GLUCOSE BLDV-MCNC: 157 MG/DL — HIGH (ref 70–99)
GLUCOSE SERPL-MCNC: 151 MG/DL — HIGH (ref 70–99)
GLUCOSE UR QL: NEGATIVE — SIGNIFICANT CHANGE UP
HCO3 BLDV-SCNC: 32 MMOL/L — HIGH (ref 22–29)
HCT VFR BLD CALC: 41.7 % — SIGNIFICANT CHANGE UP (ref 34.5–45)
HCT VFR BLDA CALC: 43 % — SIGNIFICANT CHANGE UP (ref 34.5–46.5)
HGB BLD CALC-MCNC: 14.4 G/DL — SIGNIFICANT CHANGE UP (ref 11.7–16.1)
HGB BLD-MCNC: 15.2 G/DL — SIGNIFICANT CHANGE UP (ref 11.5–15.5)
HYALINE CASTS # UR AUTO: 2 /LPF — SIGNIFICANT CHANGE UP (ref 0–7)
IANC: 3.41 K/UL — SIGNIFICANT CHANGE UP (ref 1.8–7.4)
IMM GRANULOCYTES NFR BLD AUTO: 0.5 % — SIGNIFICANT CHANGE UP (ref 0–0.9)
INR BLD: 1.21 RATIO — HIGH (ref 0.88–1.16)
KETONES UR-MCNC: NEGATIVE — SIGNIFICANT CHANGE UP
LACTATE BLDV-MCNC: 2.2 MMOL/L — HIGH (ref 0.5–2)
LEUKOCYTE ESTERASE UR-ACNC: ABNORMAL
LYMPHOCYTES # BLD AUTO: 0.37 K/UL — LOW (ref 1–3.3)
LYMPHOCYTES # BLD AUTO: 8.5 % — LOW (ref 13–44)
MCHC RBC-ENTMCNC: 32.6 PG — SIGNIFICANT CHANGE UP (ref 27–34)
MCHC RBC-ENTMCNC: 36.5 GM/DL — HIGH (ref 32–36)
MCV RBC AUTO: 89.5 FL — SIGNIFICANT CHANGE UP (ref 80–100)
MONOCYTES # BLD AUTO: 0.56 K/UL — SIGNIFICANT CHANGE UP (ref 0–0.9)
MONOCYTES NFR BLD AUTO: 12.8 % — SIGNIFICANT CHANGE UP (ref 2–14)
NEUTROPHILS # BLD AUTO: 3.41 K/UL — SIGNIFICANT CHANGE UP (ref 1.8–7.4)
NEUTROPHILS NFR BLD AUTO: 78 % — HIGH (ref 43–77)
NITRITE UR-MCNC: NEGATIVE — SIGNIFICANT CHANGE UP
NRBC # BLD: 0 /100 WBCS — SIGNIFICANT CHANGE UP (ref 0–0)
NRBC # FLD: 0 K/UL — SIGNIFICANT CHANGE UP (ref 0–0)
PCO2 BLDV: 46 MMHG — SIGNIFICANT CHANGE UP (ref 39–52)
PH BLDV: 7.45 — HIGH (ref 7.32–7.43)
PH UR: 5.5 — SIGNIFICANT CHANGE UP (ref 5–8)
PLATELET # BLD AUTO: 159 K/UL — SIGNIFICANT CHANGE UP (ref 150–400)
PO2 BLDV: 25 MMHG — SIGNIFICANT CHANGE UP (ref 25–45)
POTASSIUM BLDV-SCNC: 2.8 MMOL/L — CRITICAL LOW (ref 3.5–5.1)
POTASSIUM SERPL-MCNC: 2.9 MMOL/L — CRITICAL LOW (ref 3.5–5.3)
POTASSIUM SERPL-SCNC: 2.9 MMOL/L — CRITICAL LOW (ref 3.5–5.3)
PROCALCITONIN SERPL-MCNC: 1.88 NG/ML — HIGH (ref 0.02–0.1)
PROT SERPL-MCNC: 7.3 G/DL — SIGNIFICANT CHANGE UP (ref 6–8.3)
PROT UR-MCNC: ABNORMAL
PROTHROM AB SERPL-ACNC: 14.1 SEC — HIGH (ref 10.5–13.4)
RBC # BLD: 4.66 M/UL — SIGNIFICANT CHANGE UP (ref 3.8–5.2)
RBC # FLD: 12.7 % — SIGNIFICANT CHANGE UP (ref 10.3–14.5)
RBC CASTS # UR COMP ASSIST: 6 /HPF — HIGH (ref 0–4)
SAO2 % BLDV: 29.7 % — LOW (ref 67–88)
SODIUM SERPL-SCNC: 127 MMOL/L — LOW (ref 135–145)
SP GR SPEC: 1.02 — SIGNIFICANT CHANGE UP (ref 1.01–1.05)
TSH SERPL-MCNC: 2.19 UIU/ML — SIGNIFICANT CHANGE UP (ref 0.27–4.2)
UROBILINOGEN FLD QL: ABNORMAL
WBC # BLD: 4.37 K/UL — SIGNIFICANT CHANGE UP (ref 3.8–10.5)
WBC # FLD AUTO: 4.37 K/UL — SIGNIFICANT CHANGE UP (ref 3.8–10.5)
WBC UR QL: 53 /HPF — HIGH (ref 0–5)

## 2023-05-19 PROCEDURE — 71045 X-RAY EXAM CHEST 1 VIEW: CPT | Mod: 26

## 2023-05-19 PROCEDURE — 99285 EMERGENCY DEPT VISIT HI MDM: CPT

## 2023-05-19 RX ORDER — POTASSIUM CHLORIDE 20 MEQ
40 PACKET (EA) ORAL ONCE
Refills: 0 | Status: COMPLETED | OUTPATIENT
Start: 2023-05-19 | End: 2023-05-19

## 2023-05-19 RX ORDER — ACETAMINOPHEN 500 MG
1000 TABLET ORAL ONCE
Refills: 0 | Status: COMPLETED | OUTPATIENT
Start: 2023-05-19 | End: 2023-05-19

## 2023-05-19 RX ORDER — SODIUM CHLORIDE 9 MG/ML
1000 INJECTION INTRAMUSCULAR; INTRAVENOUS; SUBCUTANEOUS ONCE
Refills: 0 | Status: DISCONTINUED | OUTPATIENT
Start: 2023-05-19 | End: 2023-05-20

## 2023-05-19 RX ORDER — PIPERACILLIN AND TAZOBACTAM 4; .5 G/20ML; G/20ML
3.38 INJECTION, POWDER, LYOPHILIZED, FOR SOLUTION INTRAVENOUS ONCE
Refills: 0 | Status: COMPLETED | OUTPATIENT
Start: 2023-05-19 | End: 2023-05-19

## 2023-05-19 RX ORDER — SODIUM CHLORIDE 9 MG/ML
1000 INJECTION INTRAMUSCULAR; INTRAVENOUS; SUBCUTANEOUS ONCE
Refills: 0 | Status: COMPLETED | OUTPATIENT
Start: 2023-05-19 | End: 2023-05-19

## 2023-05-19 RX ORDER — VANCOMYCIN HCL 1 G
1000 VIAL (EA) INTRAVENOUS ONCE
Refills: 0 | Status: DISCONTINUED | OUTPATIENT
Start: 2023-05-19 | End: 2023-05-19

## 2023-05-19 RX ORDER — ONDANSETRON 8 MG/1
4 TABLET, FILM COATED ORAL ONCE
Refills: 0 | Status: COMPLETED | OUTPATIENT
Start: 2023-05-19 | End: 2023-05-19

## 2023-05-19 RX ORDER — POTASSIUM CHLORIDE 20 MEQ
40 PACKET (EA) ORAL DAILY
Refills: 0 | Status: DISCONTINUED | OUTPATIENT
Start: 2023-05-19 | End: 2023-05-19

## 2023-05-19 RX ADMIN — Medication 400 MILLIGRAM(S): at 22:27

## 2023-05-19 RX ADMIN — PIPERACILLIN AND TAZOBACTAM 200 GRAM(S): 4; .5 INJECTION, POWDER, LYOPHILIZED, FOR SOLUTION INTRAVENOUS at 23:53

## 2023-05-19 RX ADMIN — SODIUM CHLORIDE 1000 MILLILITER(S): 9 INJECTION INTRAMUSCULAR; INTRAVENOUS; SUBCUTANEOUS at 22:26

## 2023-05-19 NOTE — ED ADULT NURSE NOTE - NSFALLUNIVINTERV_ED_ALL_ED
Bed/Stretcher in lowest position, wheels locked, appropriate side rails in place/Call bell, personal items and telephone in reach/Instruct patient to call for assistance before getting out of bed/chair/stretcher/Non-slip footwear applied when patient is off stretcher/[place] to call system/Physically safe environment - no spills, clutter or unnecessary equipment/Purposeful proactive rounding/Room/bathroom lighting operational, light cord in reach

## 2023-05-19 NOTE — ED PROVIDER NOTE - CLINICAL SUMMARY MEDICAL DECISION MAKING FREE TEXT BOX
58F PMH HTN, MS p/w 3 days of generalized weakness, fevers., nausea, diarrhea. VS and exam as above  Unclear source of inxn at this time but concern for possible intraabdominal given nausea and diarrhea. Lower concern for pulmonary source of infxn at this time. Plan to order sepsis labs, ua/ucx, cxr, ivf, abx as needed, reassess symptoms. Will need ct a/p given nausea/diarrhea/fevers

## 2023-05-19 NOTE — ED PROVIDER NOTE - OBJECTIVE STATEMENT
58F PMH HTN, MS p/w 3 days of generalized weakness, fevers. Vomited but nonbloody in nature. Also had 2 episodes of diarrhea nonbloody in nature today. No cp/sob, abd pain, urinary symptoms, recent travel, cough, sore throat, rhinorrhea, leg swelling, recent travel. Pt gets ocrevus infusion q6 months for MS; last infusion Feb.     PCP: Dr. Yessica Sánchez

## 2023-05-19 NOTE — ED PROVIDER NOTE - PHYSICAL EXAMINATION
Physical Exam:  Gen: tired appearing, awake alert   HEENT: normal conjunctiva, oral mucosa dry  Lung: CTAB, no respiratory distress, no wheezes/rhonchi/rales B/L, speaking in full sentences  CV: RRR  Abd: soft, NT, ND, no guarding, no rigidity, no rebound tenderness  MSK: no visible deformities  Skin: Warm, well perfused, no rash, no leg swelling  ~Gerson Lackey MD (PGY-3)

## 2023-05-19 NOTE — ED ADULT TRIAGE NOTE - SOURCE OF INFORMATION
Discharge Instructions  Head Injury    You have been seen today for a head injury. Your evaluation included a history and physical examination. You may have had a CT (CAT) scan performed, though most head injuries do not require a scan. Based on this evaluation, your provider today does not feel that your head injury is serious.    Generally, every Emergency Department visit should have a follow-up clinic visit with either a primary or a specialty clinic/provider. Please follow-up as instructed by your emergency provider today.  Return to the Emergency Department if:  You are confused or you are not acting right.  Your headache gets worse or you start to have a really bad headache even with your recommended treatment plan.  You vomit (throw up) more than once.  You have a seizure.  You have trouble walking.  You have weakness or paralysis (cannot move) in an arm or a leg.  You have blood or fluid coming from your ears or nose.  You have new symptoms or anything that worries you.    Sleeping:  It is okay for you to sleep, but someone should wake you up if instructed by your provider, and someone should check on you at your usual time to wake up.     Activity:  Do not drive for at least 24 hours.  Do not drive if you have dizzy spells or trouble concentrating, or remembering things.  Do not return to any contact sports until cleared by your regular provider.     MORE INFORMATION:    Concussion:  A concussion is a minor head injury that may cause temporary problems with the way the brain works. Although concussions are important, they are generally not an emergency or a reason that a person needs to be hospitalized. Some concussion symptoms include confusion, amnesia (forgetful), nausea (sick to your stomach) and vomiting (throwing up), dizziness, fatigue, memory or concentration problems, irritability and sleep problems. For most people, concussions are mild and temporary but some will have more severe and persistent  symptoms that require on-going care and treatment.  CT Scans: Your evaluation today may have included a CT scan (CAT scan) to look for things like bleeding or a skull fracture (broken bone).  CT scans involve radiation and too many CT scans can cause serious health problems like cancer, especially in children.  Because of this, your provider may not have ordered a CT scan today if they think you are at low risk for a serious or life threatening problem.    If you were given a prescription for medicine here today, be sure to read all of the information (including the package insert) that comes with your prescription.  This will include important information about the medicine, its side effects, and any warnings that you need to know about.  The pharmacist who fills the prescription can provide more information and answer questions you may have about the medicine.  If you have questions or concerns that the pharmacist cannot address, please call or return to the Emergency Department.     Remember that you can always come back to the Emergency Department if you are not able to see your regular provider in the amount of time listed above, if you get any new symptoms, or if there is anything that worries you.     Patient

## 2023-05-19 NOTE — ED PROVIDER NOTE - ATTENDING CONTRIBUTION TO CARE
59 yo f hx HTN, MS, presenting with complaints of 5 days of fevers and generalized weakness and 3 days of nv and diarrhea that started today. No recent travel/sick contacts. On exam, pt is well appearing, NAD, heart rrr, lungs ctab, abd soft nt/nd, no LE edema. Plan for sepsis work up and tba

## 2023-05-19 NOTE — ED PROVIDER NOTE - PROGRESS NOTE DETAILS
Tacho, pGY3: patient's BP initially soft, however, have been mapping >65 for several hours. Discussed LFTs w/ patient who reports they have been increasing and is being followed by VINICIO medeiros. Discussed case with hospitalist who requested rpt BMP; lab sent and will admit on tele

## 2023-05-20 DIAGNOSIS — R74.8 ABNORMAL LEVELS OF OTHER SERUM ENZYMES: ICD-10-CM

## 2023-05-20 DIAGNOSIS — A09 INFECTIOUS GASTROENTERITIS AND COLITIS, UNSPECIFIED: ICD-10-CM

## 2023-05-20 DIAGNOSIS — E87.1 HYPO-OSMOLALITY AND HYPONATREMIA: ICD-10-CM

## 2023-05-20 DIAGNOSIS — N39.0 URINARY TRACT INFECTION, SITE NOT SPECIFIED: ICD-10-CM

## 2023-05-20 DIAGNOSIS — I10 ESSENTIAL (PRIMARY) HYPERTENSION: ICD-10-CM

## 2023-05-20 DIAGNOSIS — E87.6 HYPOKALEMIA: ICD-10-CM

## 2023-05-20 DIAGNOSIS — G35 MULTIPLE SCLEROSIS: ICD-10-CM

## 2023-05-20 DIAGNOSIS — R50.9 FEVER, UNSPECIFIED: ICD-10-CM

## 2023-05-20 DIAGNOSIS — A41.9 SEPSIS, UNSPECIFIED ORGANISM: ICD-10-CM

## 2023-05-20 DIAGNOSIS — Z29.9 ENCOUNTER FOR PROPHYLACTIC MEASURES, UNSPECIFIED: ICD-10-CM

## 2023-05-20 LAB
A1C WITH ESTIMATED AVERAGE GLUCOSE RESULT: 7.4 % — HIGH (ref 4–5.6)
ALBUMIN SERPL ELPH-MCNC: 3.6 G/DL — SIGNIFICANT CHANGE UP (ref 3.3–5)
ALP SERPL-CCNC: 78 U/L — SIGNIFICANT CHANGE UP (ref 40–120)
ALT FLD-CCNC: 553 U/L — HIGH (ref 4–33)
ANION GAP SERPL CALC-SCNC: 10 MMOL/L — SIGNIFICANT CHANGE UP (ref 7–14)
ANION GAP SERPL CALC-SCNC: 14 MMOL/L — SIGNIFICANT CHANGE UP (ref 7–14)
ANION GAP SERPL CALC-SCNC: 15 MMOL/L — HIGH (ref 7–14)
APAP SERPL-MCNC: <10 UG/ML — LOW (ref 15–25)
AST SERPL-CCNC: 598 U/L — HIGH (ref 4–32)
B PERT DNA SPEC QL NAA+PROBE: SIGNIFICANT CHANGE UP
B PERT+PARAPERT DNA PNL SPEC NAA+PROBE: SIGNIFICANT CHANGE UP
BASE EXCESS BLDV CALC-SCNC: 3.1 MMOL/L — HIGH (ref -2–3)
BASOPHILS # BLD AUTO: 0.01 K/UL — SIGNIFICANT CHANGE UP (ref 0–0.2)
BASOPHILS NFR BLD AUTO: 0.2 % — SIGNIFICANT CHANGE UP (ref 0–2)
BILIRUB SERPL-MCNC: 0.6 MG/DL — SIGNIFICANT CHANGE UP (ref 0.2–1.2)
BLOOD GAS VENOUS COMPREHENSIVE RESULT: SIGNIFICANT CHANGE UP
BORDETELLA PARAPERTUSSIS (RAPRVP): SIGNIFICANT CHANGE UP
BUN SERPL-MCNC: 14 MG/DL — SIGNIFICANT CHANGE UP (ref 7–23)
BUN SERPL-MCNC: 16 MG/DL — SIGNIFICANT CHANGE UP (ref 7–23)
BUN SERPL-MCNC: 20 MG/DL — SIGNIFICANT CHANGE UP (ref 7–23)
C PNEUM DNA SPEC QL NAA+PROBE: SIGNIFICANT CHANGE UP
CALCIUM SERPL-MCNC: 7.8 MG/DL — LOW (ref 8.4–10.5)
CALCIUM SERPL-MCNC: 7.8 MG/DL — LOW (ref 8.4–10.5)
CALCIUM SERPL-MCNC: 8.2 MG/DL — LOW (ref 8.4–10.5)
CHLORIDE BLDV-SCNC: 94 MMOL/L — LOW (ref 96–108)
CHLORIDE SERPL-SCNC: 92 MMOL/L — LOW (ref 98–107)
CHLORIDE SERPL-SCNC: 94 MMOL/L — LOW (ref 98–107)
CHLORIDE SERPL-SCNC: 96 MMOL/L — LOW (ref 98–107)
CHOLEST SERPL-MCNC: 163 MG/DL — SIGNIFICANT CHANGE UP
CO2 BLDV-SCNC: 28.2 MMOL/L — HIGH (ref 22–26)
CO2 SERPL-SCNC: 22 MMOL/L — SIGNIFICANT CHANGE UP (ref 22–31)
CO2 SERPL-SCNC: 23 MMOL/L — SIGNIFICANT CHANGE UP (ref 22–31)
CO2 SERPL-SCNC: 24 MMOL/L — SIGNIFICANT CHANGE UP (ref 22–31)
CREAT SERPL-MCNC: 0.79 MG/DL — SIGNIFICANT CHANGE UP (ref 0.5–1.3)
CREAT SERPL-MCNC: 0.87 MG/DL — SIGNIFICANT CHANGE UP (ref 0.5–1.3)
CREAT SERPL-MCNC: 1.21 MG/DL — SIGNIFICANT CHANGE UP (ref 0.5–1.3)
EGFR: 52 ML/MIN/1.73M2 — LOW
EGFR: 77 ML/MIN/1.73M2 — SIGNIFICANT CHANGE UP
EGFR: 87 ML/MIN/1.73M2 — SIGNIFICANT CHANGE UP
EOSINOPHIL # BLD AUTO: 0.01 K/UL — SIGNIFICANT CHANGE UP (ref 0–0.5)
EOSINOPHIL NFR BLD AUTO: 0.2 % — SIGNIFICANT CHANGE UP (ref 0–6)
ESTIMATED AVERAGE GLUCOSE: 166 — SIGNIFICANT CHANGE UP
FLUAV SUBTYP SPEC NAA+PROBE: SIGNIFICANT CHANGE UP
FLUBV RNA SPEC QL NAA+PROBE: SIGNIFICANT CHANGE UP
GAS PNL BLDV: 127 MMOL/L — LOW (ref 136–145)
GI PCR PANEL: SIGNIFICANT CHANGE UP
GLUCOSE BLDV-MCNC: 109 MG/DL — HIGH (ref 70–99)
GLUCOSE SERPL-MCNC: 102 MG/DL — HIGH (ref 70–99)
GLUCOSE SERPL-MCNC: 126 MG/DL — HIGH (ref 70–99)
GLUCOSE SERPL-MCNC: 140 MG/DL — HIGH (ref 70–99)
HADV DNA SPEC QL NAA+PROBE: SIGNIFICANT CHANGE UP
HAV IGM SER-ACNC: SIGNIFICANT CHANGE UP
HBV CORE IGM SER-ACNC: SIGNIFICANT CHANGE UP
HBV SURFACE AG SER-ACNC: SIGNIFICANT CHANGE UP
HCO3 BLDV-SCNC: 27 MMOL/L — SIGNIFICANT CHANGE UP (ref 22–29)
HCOV 229E RNA SPEC QL NAA+PROBE: SIGNIFICANT CHANGE UP
HCOV HKU1 RNA SPEC QL NAA+PROBE: SIGNIFICANT CHANGE UP
HCOV NL63 RNA SPEC QL NAA+PROBE: SIGNIFICANT CHANGE UP
HCOV OC43 RNA SPEC QL NAA+PROBE: SIGNIFICANT CHANGE UP
HCT VFR BLD CALC: 39.5 % — SIGNIFICANT CHANGE UP (ref 34.5–45)
HCT VFR BLDA CALC: 41 % — SIGNIFICANT CHANGE UP (ref 34.5–46.5)
HCV AB S/CO SERPL IA: 0.08 S/CO — SIGNIFICANT CHANGE UP (ref 0–0.99)
HCV AB SERPL-IMP: SIGNIFICANT CHANGE UP
HDLC SERPL-MCNC: 23 MG/DL — LOW
HGB BLD CALC-MCNC: 13.7 G/DL — SIGNIFICANT CHANGE UP (ref 11.7–16.1)
HGB BLD-MCNC: 13.6 G/DL — SIGNIFICANT CHANGE UP (ref 11.5–15.5)
HMPV RNA SPEC QL NAA+PROBE: SIGNIFICANT CHANGE UP
HPIV1 RNA SPEC QL NAA+PROBE: SIGNIFICANT CHANGE UP
HPIV2 RNA SPEC QL NAA+PROBE: SIGNIFICANT CHANGE UP
HPIV3 RNA SPEC QL NAA+PROBE: SIGNIFICANT CHANGE UP
HPIV4 RNA SPEC QL NAA+PROBE: SIGNIFICANT CHANGE UP
IANC: 2.91 K/UL — SIGNIFICANT CHANGE UP (ref 1.8–7.4)
IMM GRANULOCYTES NFR BLD AUTO: 0.9 % — SIGNIFICANT CHANGE UP (ref 0–0.9)
LACTATE BLDV-MCNC: 1.6 MMOL/L — SIGNIFICANT CHANGE UP (ref 0.5–2)
LACTATE SERPL-SCNC: 1.5 MMOL/L — SIGNIFICANT CHANGE UP (ref 0.5–2)
LIPID PNL WITH DIRECT LDL SERPL: 108 MG/DL — HIGH
LYMPHOCYTES # BLD AUTO: 0.6 K/UL — LOW (ref 1–3.3)
LYMPHOCYTES # BLD AUTO: 14 % — SIGNIFICANT CHANGE UP (ref 13–44)
M PNEUMO DNA SPEC QL NAA+PROBE: SIGNIFICANT CHANGE UP
MAGNESIUM SERPL-MCNC: 2 MG/DL — SIGNIFICANT CHANGE UP (ref 1.6–2.6)
MAGNESIUM SERPL-MCNC: 2.2 MG/DL — SIGNIFICANT CHANGE UP (ref 1.6–2.6)
MCHC RBC-ENTMCNC: 31.7 PG — SIGNIFICANT CHANGE UP (ref 27–34)
MCHC RBC-ENTMCNC: 34.4 GM/DL — SIGNIFICANT CHANGE UP (ref 32–36)
MCV RBC AUTO: 92.1 FL — SIGNIFICANT CHANGE UP (ref 80–100)
MONOCYTES # BLD AUTO: 0.71 K/UL — SIGNIFICANT CHANGE UP (ref 0–0.9)
MONOCYTES NFR BLD AUTO: 16.6 % — HIGH (ref 2–14)
NEUTROPHILS # BLD AUTO: 2.91 K/UL — SIGNIFICANT CHANGE UP (ref 1.8–7.4)
NEUTROPHILS NFR BLD AUTO: 68.1 % — SIGNIFICANT CHANGE UP (ref 43–77)
NON HDL CHOLESTEROL: 140 MG/DL — HIGH
NRBC # BLD: 0 /100 WBCS — SIGNIFICANT CHANGE UP (ref 0–0)
NRBC # FLD: 0 K/UL — SIGNIFICANT CHANGE UP (ref 0–0)
OSMOLALITY SERPL: 277 MOSM/KG — SIGNIFICANT CHANGE UP (ref 275–295)
PCO2 BLDV: 38 MMHG — LOW (ref 39–52)
PH BLDV: 7.46 — HIGH (ref 7.32–7.43)
PHOSPHATE SERPL-MCNC: 2.4 MG/DL — LOW (ref 2.5–4.5)
PHOSPHATE SERPL-MCNC: 4.1 MG/DL — SIGNIFICANT CHANGE UP (ref 2.5–4.5)
PLATELET # BLD AUTO: 125 K/UL — LOW (ref 150–400)
PO2 BLDV: 50 MMHG — HIGH (ref 25–45)
POTASSIUM BLDV-SCNC: 3 MMOL/L — LOW (ref 3.5–5.1)
POTASSIUM SERPL-MCNC: 2.8 MMOL/L — CRITICAL LOW (ref 3.5–5.3)
POTASSIUM SERPL-MCNC: 2.9 MMOL/L — CRITICAL LOW (ref 3.5–5.3)
POTASSIUM SERPL-MCNC: 2.9 MMOL/L — CRITICAL LOW (ref 3.5–5.3)
POTASSIUM SERPL-SCNC: 2.8 MMOL/L — CRITICAL LOW (ref 3.5–5.3)
POTASSIUM SERPL-SCNC: 2.9 MMOL/L — CRITICAL LOW (ref 3.5–5.3)
POTASSIUM SERPL-SCNC: 2.9 MMOL/L — CRITICAL LOW (ref 3.5–5.3)
PROT SERPL-MCNC: 6.3 G/DL — SIGNIFICANT CHANGE UP (ref 6–8.3)
RAPID RVP RESULT: SIGNIFICANT CHANGE UP
RBC # BLD: 4.29 M/UL — SIGNIFICANT CHANGE UP (ref 3.8–5.2)
RBC # FLD: 13 % — SIGNIFICANT CHANGE UP (ref 10.3–14.5)
RSV RNA SPEC QL NAA+PROBE: SIGNIFICANT CHANGE UP
RV+EV RNA SPEC QL NAA+PROBE: SIGNIFICANT CHANGE UP
SAO2 % BLDV: 81.1 % — SIGNIFICANT CHANGE UP (ref 67–88)
SARS-COV-2 RNA SPEC QL NAA+PROBE: SIGNIFICANT CHANGE UP
SODIUM SERPL-SCNC: 126 MMOL/L — LOW (ref 135–145)
SODIUM SERPL-SCNC: 131 MMOL/L — LOW (ref 135–145)
SODIUM SERPL-SCNC: 133 MMOL/L — LOW (ref 135–145)
T3 SERPL-MCNC: 106 NG/DL — SIGNIFICANT CHANGE UP (ref 80–200)
T4 AB SER-ACNC: 11.64 UG/DL — SIGNIFICANT CHANGE UP (ref 5.1–13)
TRIGL SERPL-MCNC: 158 MG/DL — HIGH
TSH SERPL-MCNC: 1.46 UIU/ML — SIGNIFICANT CHANGE UP (ref 0.27–4.2)
WBC # BLD: 4.28 K/UL — SIGNIFICANT CHANGE UP (ref 3.8–10.5)
WBC # FLD AUTO: 4.28 K/UL — SIGNIFICANT CHANGE UP (ref 3.8–10.5)

## 2023-05-20 PROCEDURE — 76700 US EXAM ABDOM COMPLETE: CPT | Mod: 26

## 2023-05-20 PROCEDURE — 99223 1ST HOSP IP/OBS HIGH 75: CPT

## 2023-05-20 PROCEDURE — 12345: CPT | Mod: NC

## 2023-05-20 PROCEDURE — 99222 1ST HOSP IP/OBS MODERATE 55: CPT | Mod: GC

## 2023-05-20 RX ORDER — IBUPROFEN 200 MG
400 TABLET ORAL EVERY 8 HOURS
Refills: 0 | Status: DISCONTINUED | OUTPATIENT
Start: 2023-05-20 | End: 2023-06-08

## 2023-05-20 RX ORDER — ONDANSETRON 8 MG/1
4 TABLET, FILM COATED ORAL EVERY 8 HOURS
Refills: 0 | Status: DISCONTINUED | OUTPATIENT
Start: 2023-05-20 | End: 2023-06-08

## 2023-05-20 RX ORDER — POTASSIUM CHLORIDE 20 MEQ
40 PACKET (EA) ORAL EVERY 4 HOURS
Refills: 0 | Status: COMPLETED | OUTPATIENT
Start: 2023-05-20 | End: 2023-05-20

## 2023-05-20 RX ORDER — AMITRIPTYLINE HCL 25 MG
1 TABLET ORAL
Refills: 0 | DISCHARGE

## 2023-05-20 RX ORDER — POTASSIUM CHLORIDE 20 MEQ
10 PACKET (EA) ORAL
Refills: 0 | Status: COMPLETED | OUTPATIENT
Start: 2023-05-20 | End: 2023-05-20

## 2023-05-20 RX ORDER — SODIUM,POTASSIUM PHOSPHATES 278-250MG
1 POWDER IN PACKET (EA) ORAL ONCE
Refills: 0 | Status: COMPLETED | OUTPATIENT
Start: 2023-05-20 | End: 2023-05-20

## 2023-05-20 RX ORDER — MIDODRINE HYDROCHLORIDE 2.5 MG/1
10 TABLET ORAL ONCE
Refills: 0 | Status: COMPLETED | OUTPATIENT
Start: 2023-05-20 | End: 2023-05-20

## 2023-05-20 RX ORDER — LANOLIN ALCOHOL/MO/W.PET/CERES
3 CREAM (GRAM) TOPICAL AT BEDTIME
Refills: 0 | Status: DISCONTINUED | OUTPATIENT
Start: 2023-05-20 | End: 2023-06-08

## 2023-05-20 RX ORDER — SODIUM CHLORIDE 9 MG/ML
1000 INJECTION, SOLUTION INTRAVENOUS ONCE
Refills: 0 | Status: COMPLETED | OUTPATIENT
Start: 2023-05-20 | End: 2023-05-20

## 2023-05-20 RX ORDER — HEPARIN SODIUM 5000 [USP'U]/ML
5000 INJECTION INTRAVENOUS; SUBCUTANEOUS EVERY 8 HOURS
Refills: 0 | Status: DISCONTINUED | OUTPATIENT
Start: 2023-05-20 | End: 2023-06-08

## 2023-05-20 RX ORDER — SODIUM CHLORIDE 9 MG/ML
1000 INJECTION INTRAMUSCULAR; INTRAVENOUS; SUBCUTANEOUS
Refills: 0 | Status: DISCONTINUED | OUTPATIENT
Start: 2023-05-20 | End: 2023-05-21

## 2023-05-20 RX ORDER — ACETAMINOPHEN 500 MG
650 TABLET ORAL EVERY 6 HOURS
Refills: 0 | Status: DISCONTINUED | OUTPATIENT
Start: 2023-05-20 | End: 2023-06-08

## 2023-05-20 RX ORDER — AMITRIPTYLINE HCL 25 MG
50 TABLET ORAL DAILY
Refills: 0 | Status: DISCONTINUED | OUTPATIENT
Start: 2023-05-20 | End: 2023-06-08

## 2023-05-20 RX ORDER — PANTOPRAZOLE SODIUM 20 MG/1
40 TABLET, DELAYED RELEASE ORAL DAILY
Refills: 0 | Status: DISCONTINUED | OUTPATIENT
Start: 2023-05-20 | End: 2023-05-21

## 2023-05-20 RX ORDER — PIPERACILLIN AND TAZOBACTAM 4; .5 G/20ML; G/20ML
3.38 INJECTION, POWDER, LYOPHILIZED, FOR SOLUTION INTRAVENOUS EVERY 8 HOURS
Refills: 0 | Status: DISCONTINUED | OUTPATIENT
Start: 2023-05-20 | End: 2023-05-22

## 2023-05-20 RX ADMIN — Medication 100 MILLIEQUIVALENT(S): at 17:55

## 2023-05-20 RX ADMIN — Medication 75 MILLIGRAM(S): at 18:24

## 2023-05-20 RX ADMIN — Medication 650 MILLIGRAM(S): at 02:59

## 2023-05-20 RX ADMIN — SODIUM CHLORIDE 50 MILLILITER(S): 9 INJECTION INTRAMUSCULAR; INTRAVENOUS; SUBCUTANEOUS at 03:38

## 2023-05-20 RX ADMIN — MIDODRINE HYDROCHLORIDE 10 MILLIGRAM(S): 2.5 TABLET ORAL at 02:11

## 2023-05-20 RX ADMIN — Medication 400 MILLIGRAM(S): at 09:28

## 2023-05-20 RX ADMIN — HEPARIN SODIUM 5000 UNIT(S): 5000 INJECTION INTRAVENOUS; SUBCUTANEOUS at 13:34

## 2023-05-20 RX ADMIN — Medication 50 MILLIGRAM(S): at 13:34

## 2023-05-20 RX ADMIN — Medication 100 MILLIEQUIVALENT(S): at 03:35

## 2023-05-20 RX ADMIN — Medication 100 MILLIEQUIVALENT(S): at 05:57

## 2023-05-20 RX ADMIN — PANTOPRAZOLE SODIUM 40 MILLIGRAM(S): 20 TABLET, DELAYED RELEASE ORAL at 05:57

## 2023-05-20 RX ADMIN — Medication 650 MILLIGRAM(S): at 04:14

## 2023-05-20 RX ADMIN — Medication 40 MILLIEQUIVALENT(S): at 00:32

## 2023-05-20 RX ADMIN — Medication 100 MILLIEQUIVALENT(S): at 16:42

## 2023-05-20 RX ADMIN — Medication 1 PACKET(S): at 10:22

## 2023-05-20 RX ADMIN — Medication 40 MILLIEQUIVALENT(S): at 09:25

## 2023-05-20 RX ADMIN — PIPERACILLIN AND TAZOBACTAM 25 GRAM(S): 4; .5 INJECTION, POWDER, LYOPHILIZED, FOR SOLUTION INTRAVENOUS at 07:05

## 2023-05-20 RX ADMIN — Medication 40 MILLIEQUIVALENT(S): at 13:34

## 2023-05-20 RX ADMIN — Medication 100 MILLIEQUIVALENT(S): at 19:20

## 2023-05-20 RX ADMIN — Medication 400 MILLIGRAM(S): at 10:55

## 2023-05-20 RX ADMIN — Medication 100 MILLIEQUIVALENT(S): at 04:45

## 2023-05-20 RX ADMIN — SODIUM CHLORIDE 1000 MILLILITER(S): 9 INJECTION, SOLUTION INTRAVENOUS at 00:15

## 2023-05-20 RX ADMIN — PIPERACILLIN AND TAZOBACTAM 25 GRAM(S): 4; .5 INJECTION, POWDER, LYOPHILIZED, FOR SOLUTION INTRAVENOUS at 14:57

## 2023-05-20 RX ADMIN — HEPARIN SODIUM 5000 UNIT(S): 5000 INJECTION INTRAVENOUS; SUBCUTANEOUS at 06:41

## 2023-05-20 RX ADMIN — HEPARIN SODIUM 5000 UNIT(S): 5000 INJECTION INTRAVENOUS; SUBCUTANEOUS at 22:18

## 2023-05-20 NOTE — H&P ADULT - NSICDXFAMILYHX_GEN_ALL_CORE_FT
FAMILY HISTORY:  Father  Still living? Unknown  FH: esophageal cancer, Age at diagnosis: Age Unknown    Mother  Still living? Unknown  FH: cirrhosis, Age at diagnosis: Age Unknown

## 2023-05-20 NOTE — H&P ADULT - NSHPPHYSICALEXAM_GEN_ALL_CORE
Vital Signs Last 24 Hrs  T(C): 38.2 (20 May 2023 02:54), Max: 39.8 (19 May 2023 22:00)  T(F): 100.8 (20 May 2023 02:54), Max: 103.6 (19 May 2023 22:00)  HR: 103 (20 May 2023 02:54) (92 - 118)  BP: 102/50 (20 May 2023 02:54) (72/56 - 116/54)  BP(mean): 69 (20 May 2023 00:51) (63 - 69)  RR: 15 (20 May 2023 02:54) (15 - 22)  SpO2: 95% (20 May 2023 02:54) (92% - 99%)    Parameters below as of 20 May 2023 02:54  Patient On (Oxygen Delivery Method): nasal cannula  O2 Flow (L/min): 2

## 2023-05-20 NOTE — CONSULT NOTE ADULT - ASSESSMENT
Impression:     #Elevated liver enzymes  Reported chronic elevation of AST/ALT in 200s since last year, attributed to genetic condition vs. Ocrevus infusion? Previously followed with GI in [place], no hepatology. Mother  of liver cirrhosis, non alcoholic, unknown etiology. Patient denies severe alcohol use, no new meds or herbal supplements.   - Differentials include superimposed ischemic liver injury in a chronic liver disease due to hypotension on admission, hepatitis infection, autoimmune etc. Ocrevus infusion known to cause diarrhea and colitis, however unlikely to cause liver toxicity.   - Liver enzymes trending down.   - US abd showed hepatic steatosis.     Recommendations:   - please send HAV, HBVsAb, HBVsAg, HBVcAb, HCV Ab, HCV RNA for viral etiologies  - please send alpha-1 anti-trypsin (phenotype), ceruloplasm, AFP  - please send MAYELA, anti-smooth muscle antibody, immunoglobulins (IgG, IgM, IgA quantitative) for autoimmune etiologies   - Please send EBV PCR, CMV PCR, HSV PCR and hep E Ag.   - Please send blood cultures.   - Continue with Abx for now.   - can advance diet as tolerated.   - CMP, PT/INR, and CBC daily.     All recommendations are tentative until note is attested by an attending.     Milton Griffith, PGY-4  Gastroenterology/Hepatology Fellow  Available on [employer] Teams  [phone] (Short Range Pager)  [phone] (Long Range Pager)    After 5pm, please contact the on-call GI fellow. [phone]

## 2023-05-20 NOTE — PATIENT PROFILE ADULT - FALL HARM RISK - HARM RISK INTERVENTIONS

## 2023-05-20 NOTE — CONSULT NOTE ADULT - SUBJECTIVE AND OBJECTIVE BOX
HPI:    Ms. Ricks is a 58 yrs old female w/ Hx of HTN, MS c/o 3 days of generalized weakness, fever and chills with associated nbnb vomiting and diarrhea. Reported that her diarrhea is resolved, no BM overnight. But still has nausea and chills. Reported that she has chronic elevation of AST/ALT in 200s, since last year, which peaked to 700s in Dec 2022. Was seen by GI in [place], who reported that the liver enzymes are elevated due to genetic condition,  unclear?. Drinks one beer per week,  no herbal supplements or herbal tea, uses vitamin B12 and D3 supplements. No new medications, only on Ocrevus infusion every 6 months, last one in 2023. No recent travel or sick contacts. On admission, febrile, and tachycardic, hypotensive, responded to fluids. Labs show AST/ALT 700s/600s with normal t. bili and alk phos, hepatocellular injury. Hepatology consulted for elevated liver enzymes.     Allergies:  No Known Allergies      Home Medications:  Ocrevus infusion every 6 months.     Hospital Medications:  acetaminophen     Tablet .. 650 milliGRAM(s) Oral every 6 hours PRN  amitriptyline 50 milliGRAM(s) Oral daily  heparin   Injectable 5000 Unit(s) SubCutaneous every 8 hours  ibuprofen  Tablet. 400 milliGRAM(s) Oral every 8 hours PRN  melatonin 3 milliGRAM(s) Oral at bedtime PRN  ondansetron Injectable 4 milliGRAM(s) IV Push every 8 hours PRN  pantoprazole  Injectable 40 milliGRAM(s) IV Push daily  piperacillin/tazobactam IVPB.. 3.375 Gram(s) IV Intermittent every 8 hours  potassium chloride  10 mEq/100 mL IVPB 10 milliEquivalent(s) IV Intermittent every 1 hour  pregabalin 75 milliGRAM(s) Oral daily  sodium chloride 0.9%. 1000 milliLiter(s) IV Continuous <Continuous>    PMHX/PSHX:  MS (multiple sclerosis)    HTN (hypertension)    Family history:  FH: cirrhosis (Mother), unknown etiology, esophageal cancer (Father)      Social History:   Tob: Drinks one beer per week.   EtOH: Denies  Illicit Drugs: Denies    ROS:     General:  No wt loss, + fevers, chills, night sweats, fatigue  Eyes:  Good vision, no reported pain  ENT:  No sore throat, pain, runny nose, dysphagia  CV:  No pain, palpitations, hypo/hypertension  Pulm:  No dyspnea, cough, tachypnea, wheezing  GI:  see HPI  :  No pain, bleeding, incontinence, nocturia  Muscle:  No pain, weakness  Neuro:  No weakness, tingling, memory problems  Psych:  No fatigue, insomnia, mood problems, depression  Endocrine:  No polyuria, polydipsia, cold/heat intolerance  Heme:  No petechiae, ecchymosis, easy bruisability  Skin:  No rash, tattoos, scars, edema    PHYSICAL EXAM:     GENERAL:  Mild distress, lying in bed  HEENT:  NCAT, no scleral icterus, on nasal cannula  CHEST:  no respiratory distress  HEART:  Regular rate and rhythm  ABDOMEN:  Soft, non-tender, non-distended, no masses  EXTREMITIES: No LE edema  SKIN:  No rash/erythema/ecchymoses/petechiae/wounds/abscess/warm/dry  NEURO:  Alert and oriented x 3, no tremors.     Vital Signs:  Vital Signs Last 24 Hrs  T(C): 36.7 (20 May 2023 15:08), Max: 39.8 (19 May 2023 22:00)  T(F): 98 (20 May 2023 15:08), Max: 103.6 (19 May 2023 22:00)  HR: 86 (20 May 2023 15:08) (82 - 118)  BP: 111/46 (20 May 2023 15:08) (72/56 - 116/54)  BP(mean): 69 (20 May 2023 00:51) (63 - 69)  RR: 18 (20 May 2023 15:08) (15 - 22)  SpO2: 94% (20 May 2023 15:08) (92% - 99%)    Parameters below as of 20 May 2023 15:08  Patient On (Oxygen Delivery Method): nasal cannula  O2 Flow (L/min): 2    Daily     Daily     LABS:                        13.6   4.28  )-----------( 125      ( 20 May 2023 07:20 )             39.5     Mean Cell Volume: 92.1 fL (-23 @ 07:20)        133<L>  |  96<L>  |  14  ----------------------------<  140<H>  2.9<LL>   |  23  |  0.79    Ca    7.8<L>      20 May 2023 15:42  Phos  2.4     05-20  Mg     2.20     05-20    TPro  6.3  /  Alb  3.6  /  TBili  0.6  /  DBili  x   /  AST  598<H>  /  ALT  553<H>  /  AlkPhos  78  05-20    LIVER FUNCTIONS - ( 20 May 2023 07:20 )  Alb: 3.6 g/dL / Pro: 6.3 g/dL / ALK PHOS: 78 U/L / ALT: 553 U/L / AST: 598 U/L / GGT: x           PT/INR - ( 19 May 2023 22:30 )   PT: 14.1 sec;   INR: 1.21 ratio         PTT - ( 19 May 2023 22:30 )  PTT:34.5 sec  Urinalysis Basic - ( 19 May 2023 22:05 )    Color: Yellow / Appearance: Slightly Turbid / S.021 / pH: x  Gluc: x / Ketone: Negative  / Bili: Negative / Urobili: 3 mg/dL   Blood: x / Protein: 100 mg/dL / Nitrite: Negative   Leuk Esterase: Large / RBC: 6 /HPF / WBC 53 /HPF   Sq Epi: x / Non Sq Epi: x / Bacteria: Many                              13.6   4.28  )-----------( 125      ( 20 May 2023 07:20 )             39.5                         15.2   4.37  )-----------( 159      ( 19 May 2023 22:30 )             41.7       Imaging:      Abd US     FINDINGS:  Liver: Echogenic liver parenchyma compatible with hepatic steatosis.  The   main portal vein is patent with hepatopedal flow.  Bile ducts: Normal caliber. Common bile duct measures 3 mm.  Gallbladder: Within normal limits.  Pancreas: Poorly visualized due to bowel gas  Spleen: 11.3 cm. Within normal limits.  Right kidney: 12.2 cm. Mild hydronephrosis.  Left kidney: 12.0 cm. No hydronephrosis.  Ascites: None.  Aorta and IVC: Visualized portions are within normal limits.  Bladder: Partially distended.  A left ureteral jet is visualized.  The   right ureteral jet is not visualized.    IMPRESSION:  Hepatic steatosis.    No sonographic evidence of cholelithiasis, acute cholecystitis or dilated   bile ducts.    Mild right hydronephrosis.  The right ureteral jet is not visualized.  A   right ureteral calculus or other cause for right ureteral obstruction is   not excluded. Underlying genitourinary infection and/or pyelonephritis   should be excluded based on clinical symptoms and laboratory values.

## 2023-05-20 NOTE — H&P ADULT - PROBLEM SELECTOR PLAN 4
Sodium: 127-->131;  Likely multifactorial due to sepsis, SIADH, possible Amitriptyline side-effect   s/p 2L NS and 1L LR in ED  -Hold Amitriptyline   -Monitor sodium daily   -TSH: WNL  -Control nausea: Zofran  IV PRN   -Serum and Urine osmolalities if worsens

## 2023-05-20 NOTE — ED ADULT NURSE REASSESSMENT NOTE - NS ED NURSE REASSESS COMMENT FT1
Break coverage RN: Pt A&Ox4 resting on stretcher. Respirations even and unlabored. IV sites patent, no redness or swelling noted. Pt offers no complaints at this time. Pending admission. pt aware to call for assistance prior to getting up due to hypotension. bed in lowest position, side rails up, call bell in hand, safety maintained. pt family at bedside.

## 2023-05-20 NOTE — H&P ADULT - PROBLEM SELECTOR PLAN 7
c/w Raiza Relatively hypotensive given that on triple agent regimen; Initial SBP in 80s mmHg; s/p Midodrine in ED  -Hold all  antihypertensives   -BP check q4h  -Low threshold for MICU c/s Relatively hypotensive given that on triple agent regimen; Initial SBP in 80s mmHg; s/p Midodrine in ED  -Hold all  antihypertensives (takes European Triplixam 10/2.5/10)   -BP check q4h  -Low threshold for MICU c/s

## 2023-05-20 NOTE — H&P ADULT - PROBLEM SELECTOR PLAN 3
Worsening chronic transaminitis, x20 upper normal level now;   Reports elevated liver enzymes in "200s" since February, which were investigated by her gastroenterologist (unclear etiology); Reports recent Tylenol use but not exceeding max daily dose;   -Hold Amitriptyline  -Acetaminophen level  -Monitor  LFTs daily  -Formal Hepatology c/s requested   -Acute hepatitis panel in AM

## 2023-05-20 NOTE — H&P ADULT - PROBLEM SELECTOR PLAN 5
Possible UTI given UA c/w infection; Vague abd symptoms;   -Already on Zosyn IV   -f/u UCx and BCx   -Urine Chlamydia/GC JAGJIT  ordered

## 2023-05-20 NOTE — H&P ADULT - PROBLEM SELECTOR PLAN 6
Due to GI losses;  Magnesium wnl  Cont. to replete potassium   Telemetry until potassium normalized   Monitor lytes and renal function

## 2023-05-20 NOTE — CONSULT NOTE ADULT - ATTENDING COMMENTS
58F, overweight BMI 29, HTN, MS on ocralizumab/Ocrevus (anti-CD20) q6mo, chronic AST/ALT elevation 200, was 700 in Sep 2022, admitted with fever/chills/body aches x1 week, had N/V/diarrhea last week which resolved, found AST//696 > 598/553 with normal bili/ALP, Na 127, K 2.9, SUSAN with creat 1.70 > 0.87.   Took Tylenol 4g 2d PTA, 2g 1 d PTA.  FHx: mother  from non-alcoholic cirrhosis. SHx: 1 beer/week  Hmeds:     - CXR clear    - in ED hypotensive and tachycardic 118, 83/52, last fever 101.2 (9:31am)  - WBC 4.3, Hb 13.6, , INR 1.21  - Na 126, K 2.9, BUN 16, creat 0.87  - 0.6/78, 598/553, albumin 3.6  - UA: +LE large, no nitrite, many bacteria - may have UTI  - Hmeds: pregabalin, prn motrin, ocralizumab    - sepsis with init. hypotension  - UTI?  - elevated AST/ALT: DD includes shock liver, viral infection, DILI if took ibuprofen, other    Plan:  - Agree with Zosyn  - US abdomen  - ABC, EBV, CMV, HEV, HSV, MAYELA, ASMA, IgG;  - IV fluids;

## 2023-05-20 NOTE — H&P ADULT - HISTORY OF PRESENT ILLNESS
57yo Female with MHx of HTN, MS c/o 3 days of generalized weakness and fever with associated nbnb vomiting. Also reports 2 episodes of nb diarrhea today. Otherwise reports no cp/sob, abd pain, urinary symptoms, recent travel, cough, sore throat, rhinorrhea, leg swelling, recent travel. Pt gets Ocrevus infusion every 6 months; Last infusion was in February, 2023.      59yo Female with MHx of HTN, MS c/o 3 days of generalized weakness, fever and chills with associated nbnb vomiting. Also reports 2 episodes of nb diarrhea today and yesterday with associated moerate Abd discomfort in the epigastric region. States has been taking Tylenol for fever and gen body pain, specifically:  1 day ago took 1g, 2 days ago took 4g, and 3 days ago took 1g. Otherwise reports no cp/sob, abd pain, urinary symptoms, recent travel, cough, sore throat, rhinorrhea, leg swelling, recent travel. Pt gets Ocrevus infusion every 6 months; Last infusion was in February, 2023.   Of note, the pt reports elevated liver enzymes in "200s" since February, which were investigated by her gastroenterologist (unclear etiology). Possibly attributed to Ocrevus infusion per the pt.

## 2023-05-20 NOTE — H&P ADULT - PROBLEM SELECTOR PLAN 2
Last BM over 10 hrs ago   -Protonix IVP empiric  -Clears diet  -Stool Cx  -GI PCR   -Monitor lytes   -s/p fluid resuscitation

## 2023-05-20 NOTE — H&P ADULT - TIME BILLING
Reviewed admission imaging reports, and admission labs prior to the encounter with  the patient   Reviewed Triage and ED course/ documentation   Performed full physical exam and ROS  Obtained list of home medications and performed home medications reconciliation on EMR  Discussed prognosis, treatment and further plan with the patient  Ordered or reviewed new admission medications and placed or reviewed all hospitalization admission orders  Managed (continued, held or adjusted doses) of home medications   Ordered  or reviewed orders of  new imaging and new lab w/up  Requested new consultations including::: GI-Hepatology

## 2023-05-20 NOTE — H&P ADULT - ASSESSMENT
57yo Female with MHx of HTN, MS a/w sepsis likely due to gastroenteritis of infectious etiology; Found to have transaminitis;  59yo Female with MHx of HTN, MS a/w sepsis likely due to gastroenteritis of infectious etiology c/b hypotension and SUSAN; Found to have transaminitis; r/o bacteremia;

## 2023-05-20 NOTE — H&P ADULT - RESPIRATORY AND THORAX
From: Brando Peterson  To: Su Sanchez  Sent: 10/16/2020 10:55 AM CDT  Subject: Non-Urgent Medical Question    This message is being sent by Shawna Peterson on behalf of Brando Peterson    Aj has been vomiting since 7AM this morning and is complaining of pain at the bottom of his esophogus. I really think its acid relux but when I try to give him any antacids he vomits. He is unabel to keep water down. He does not have a fever or cough, he has not been exposed to anyone with Covid. How long should I let this persist before I being bringing him in? Thanks  Shawna   details…

## 2023-05-20 NOTE — H&P ADULT - NSHPLABSRESULTS_GEN_ALL_CORE
.    -Personally interpreted EKG:      -Personally interpreted CXR: clear lungs, no pleural effusion, no pneumothorax      -Personally reviewed labs:    22:30 - VBG - pH: 7.45  | pCO2: 46    | pO2: 25    | Lactate: 2.2                            15.2   4.37  )-----------( 159      ( 19 May 2023 22:30 )             41.7   05-20    131<L>  |  92<L>  |  20  ----------------------------<  126<H>  2.8<LL>   |  24  |  1.21    Ca    7.8<L>      20 May 2023 02:15    TPro  7.3  /  Alb  4.3  /  TBili  0.6  /  DBili  x   /  AST  778<H>  /  ALT  696<H>  /  AlkPhos  90      Urinalysis Basic - ( 19 May 2023 22:05 )    Color: Yellow / Appearance: Slightly Turbid / S.021 / pH: x  Gluc: x / Ketone: Negative  / Bili: Negative / Urobili: 3 mg/dL   Blood: x / Protein: 100 mg/dL / Nitrite: Negative   Leuk Esterase: Large / RBC: 6 /HPF / WBC 53 /HPF   Sq Epi: x / Non Sq Epi: x / Bacteria: Many    Adenovirus (RapRVP): NotDetec (23 @ 22:30)  NL63 Coronavirus (RapRVP): NotDetec (23 @ 22:30)  OC43 Coronavirus (RapRVP): NotDetec (23 @ 22:30)  hMPV (RapRVP): NotDetec (23 @ 22:30)  Entero/Rhinovirus (RapRVP): NotDetec (23 @ 22:30)  Influenza A (RapRVP): NotDetec (23 @ 22:30)  Influenza B (RapRVP): NotDetec (23 @ 22:30)  Parainfluenza 1 (RapRVP): NotDetec (23 @ 22:30)  Parainfluenza 2 (RapRVP): NotDetec (23 @ 22:30)  Parainfluenza 3 (RapRVP): NotDetec (23 @ 22:30)  Parainfluenza 4 (RapRVP): NotDetec (23 @ 22:30)  Resp Syncytial Virus (RapRVP): NotDetec (23 @ 22:30)  Bordetella parapertussis (RapRVP): NotDetec (23 @ 22:30)  Mycoplasma pneumoniae (RapRVP): NotDetec (23 @ 22:30) .    -Personally interpreted EKG: Sinus tach 103bpm, qtc 400, incomplete RBBB, no acute Tw or ST changes, no PACs or PVCs       -Personally interpreted CXR: clear lungs, no pleural effusion, no pneumothorax      -Personally reviewed labs:    22:30 - VBG - pH: 7.45  | pCO2: 46    | pO2: 25    | Lactate: 2.2                            15.2   4.37  )-----------( 159      ( 19 May 2023 22:30 )             41.7   05    131<L>  |  92<L>  |  20  ----------------------------<  126<H>  2.8<LL>   |  24  |  1.21    Ca    7.8<L>      20 May 2023 02:15    TPro  7.3  /  Alb  4.3  /  TBili  0.6  /  DBili  x   /  AST  778<H>  /  ALT  696<H>  /  AlkPhos  90      Urinalysis Basic - ( 19 May 2023 22:05 )    Color: Yellow / Appearance: Slightly Turbid / S.021 / pH: x  Gluc: x / Ketone: Negative  / Bili: Negative / Urobili: 3 mg/dL   Blood: x / Protein: 100 mg/dL / Nitrite: Negative   Leuk Esterase: Large / RBC: 6 /HPF / WBC 53 /HPF   Sq Epi: x / Non Sq Epi: x / Bacteria: Many    Adenovirus (RapRVP): NotDetec (23 @ 22:30)  NL63 Coronavirus (RapRVP): NotDetec (23 @ 22:30)  OC43 Coronavirus (RapRVP): NotDetec (23 @ 22:30)  hMPV (RapRVP): NotDetec (23 @ 22:30)  Entero/Rhinovirus (RapRVP): NotDetec (23 @ 22:30)  Influenza A (RapRVP): NotDetec (23 @ 22:30)  Influenza B (RapRVP): NotDetec (23 @ 22:30)  Parainfluenza 1 (RapRVP): NotDetec (23 @ 22:30)  Parainfluenza 2 (RapRVP): NotDetec (23 @ 22:30)  Parainfluenza 3 (RapRVP): NotDetec (23 @ 22:30)  Parainfluenza 4 (RapRVP): NotDetec (23 @ 22:30)  Resp Syncytial Virus (RapRVP): NotDetec (23 @ 22:30)  Bordetella parapertussis (RapRVP): NotDetec (23 @ 22:30)  Mycoplasma pneumoniae (RapRVP): NotDetec (23 @ 22:30)

## 2023-05-20 NOTE — CHART NOTE - NSCHARTNOTEFT_GEN_A_CORE
Pt seen and evaluated at bedside. Spouse present at bedside. Had loose stool a few hrs back, able to provide specimen. Feels weak. Has no pain. No dysuria. No SOB. On exam, ill appearing. Chest clear, abd soft, NT, obese. No C/C of extremities. MM very dry.     #SIRS, source unclear - r/o GI infection  #Lactic acidosis  #Hyponatremia  #Hypokalemia  #Thrombocytopenia 2' acute infection  #Transaminitis    -Pending hepatology c/s.  -Repeat BMP is pending - then monitor q6h. Correct no more than 6-8 meq.  -Check Uosm, serum osm, Emily.  -Pt likely needs more fluid -- will keep at current rate for now. Already s/p 3L boluses.  -Cont IV abx for now.  -Follow up stool studies.  -Rest of plan as per H/P. Plan d/w pt and spouse at bedside. Plan d/w ACP

## 2023-05-20 NOTE — H&P ADULT - PROBLEM SELECTOR PLAN 1
Febrile, Ycwe=194.8; Tachycardic, BW=124p-64q;  c/f bacteremia;   Due to likely gastroenteritis and possible UTI;   -VS q4h  -VBG with lactate in AM  -f/u BCx, UCx  -Zosyn IV  -CXR: no c/w PNA  -RVP negative

## 2023-05-21 LAB
A1AT SERPL-MCNC: 212 MG/DL — HIGH (ref 90–200)
ALBUMIN SERPL ELPH-MCNC: 3.7 G/DL — SIGNIFICANT CHANGE UP (ref 3.3–5)
ALP SERPL-CCNC: 79 U/L — SIGNIFICANT CHANGE UP (ref 40–120)
ALT FLD-CCNC: 463 U/L — HIGH (ref 4–33)
ANION GAP SERPL CALC-SCNC: 13 MMOL/L — SIGNIFICANT CHANGE UP (ref 7–14)
ANION GAP SERPL CALC-SCNC: 14 MMOL/L — SIGNIFICANT CHANGE UP (ref 7–14)
ANION GAP SERPL CALC-SCNC: 15 MMOL/L — HIGH (ref 7–14)
ANION GAP SERPL CALC-SCNC: 16 MMOL/L — HIGH (ref 7–14)
AST SERPL-CCNC: 524 U/L — HIGH (ref 4–32)
BASOPHILS # BLD AUTO: 0.02 K/UL — SIGNIFICANT CHANGE UP (ref 0–0.2)
BASOPHILS NFR BLD AUTO: 0.6 % — SIGNIFICANT CHANGE UP (ref 0–2)
BILIRUB SERPL-MCNC: 0.5 MG/DL — SIGNIFICANT CHANGE UP (ref 0.2–1.2)
BUN SERPL-MCNC: 10 MG/DL — SIGNIFICANT CHANGE UP (ref 7–23)
BUN SERPL-MCNC: 11 MG/DL — SIGNIFICANT CHANGE UP (ref 7–23)
BUN SERPL-MCNC: 12 MG/DL — SIGNIFICANT CHANGE UP (ref 7–23)
BUN SERPL-MCNC: 12 MG/DL — SIGNIFICANT CHANGE UP (ref 7–23)
C DIFF BY PCR RESULT: SIGNIFICANT CHANGE UP
CALCIUM SERPL-MCNC: 8.1 MG/DL — LOW (ref 8.4–10.5)
CALCIUM SERPL-MCNC: 8.3 MG/DL — LOW (ref 8.4–10.5)
CALCIUM SERPL-MCNC: 8.4 MG/DL — SIGNIFICANT CHANGE UP (ref 8.4–10.5)
CALCIUM SERPL-MCNC: 8.5 MG/DL — SIGNIFICANT CHANGE UP (ref 8.4–10.5)
CERULOPLASMIN SERPL-MCNC: 26 MG/DL — SIGNIFICANT CHANGE UP (ref 16–45)
CHLORIDE SERPL-SCNC: 91 MMOL/L — LOW (ref 98–107)
CHLORIDE SERPL-SCNC: 92 MMOL/L — LOW (ref 98–107)
CO2 SERPL-SCNC: 22 MMOL/L — SIGNIFICANT CHANGE UP (ref 22–31)
CO2 SERPL-SCNC: 23 MMOL/L — SIGNIFICANT CHANGE UP (ref 22–31)
CO2 SERPL-SCNC: 23 MMOL/L — SIGNIFICANT CHANGE UP (ref 22–31)
CO2 SERPL-SCNC: 26 MMOL/L — SIGNIFICANT CHANGE UP (ref 22–31)
CREAT SERPL-MCNC: 0.71 MG/DL — SIGNIFICANT CHANGE UP (ref 0.5–1.3)
CREAT SERPL-MCNC: 0.72 MG/DL — SIGNIFICANT CHANGE UP (ref 0.5–1.3)
CREAT SERPL-MCNC: 0.74 MG/DL — SIGNIFICANT CHANGE UP (ref 0.5–1.3)
CREAT SERPL-MCNC: 0.74 MG/DL — SIGNIFICANT CHANGE UP (ref 0.5–1.3)
CULTURE RESULTS: NO GROWTH — SIGNIFICANT CHANGE UP
EGFR: 94 ML/MIN/1.73M2 — SIGNIFICANT CHANGE UP
EGFR: 94 ML/MIN/1.73M2 — SIGNIFICANT CHANGE UP
EGFR: 97 ML/MIN/1.73M2 — SIGNIFICANT CHANGE UP
EGFR: 98 ML/MIN/1.73M2 — SIGNIFICANT CHANGE UP
EOSINOPHIL # BLD AUTO: 0.03 K/UL — SIGNIFICANT CHANGE UP (ref 0–0.5)
EOSINOPHIL NFR BLD AUTO: 1 % — SIGNIFICANT CHANGE UP (ref 0–6)
GLUCOSE SERPL-MCNC: 100 MG/DL — HIGH (ref 70–99)
GLUCOSE SERPL-MCNC: 100 MG/DL — HIGH (ref 70–99)
GLUCOSE SERPL-MCNC: 107 MG/DL — HIGH (ref 70–99)
GLUCOSE SERPL-MCNC: 135 MG/DL — HIGH (ref 70–99)
HCT VFR BLD CALC: 41.7 % — SIGNIFICANT CHANGE UP (ref 34.5–45)
HCT VFR BLD CALC: 43.6 % — SIGNIFICANT CHANGE UP (ref 34.5–45)
HCV AB S/CO SERPL IA: 0.09 S/CO — SIGNIFICANT CHANGE UP (ref 0–0.99)
HCV AB SERPL-IMP: SIGNIFICANT CHANGE UP
HGB BLD-MCNC: 14.3 G/DL — SIGNIFICANT CHANGE UP (ref 11.5–15.5)
HGB BLD-MCNC: 15 G/DL — SIGNIFICANT CHANGE UP (ref 11.5–15.5)
HSV DNA1: SIGNIFICANT CHANGE UP
HSV DNA2: SIGNIFICANT CHANGE UP
HSV1 DNA BLD QL NAA+PROBE: SIGNIFICANT CHANGE UP
HSV2 DNA BLD QL NAA+PROBE: SIGNIFICANT CHANGE UP
IANC: 1.74 K/UL — LOW (ref 1.8–7.4)
IMM GRANULOCYTES NFR BLD AUTO: 0.6 % — SIGNIFICANT CHANGE UP (ref 0–0.9)
LYMPHOCYTES # BLD AUTO: 0.95 K/UL — LOW (ref 1–3.3)
LYMPHOCYTES # BLD AUTO: 30.4 % — SIGNIFICANT CHANGE UP (ref 13–44)
MAGNESIUM SERPL-MCNC: 1.9 MG/DL — SIGNIFICANT CHANGE UP (ref 1.6–2.6)
MAGNESIUM SERPL-MCNC: 2 MG/DL — SIGNIFICANT CHANGE UP (ref 1.6–2.6)
MAGNESIUM SERPL-MCNC: 2.4 MG/DL — SIGNIFICANT CHANGE UP (ref 1.6–2.6)
MCHC RBC-ENTMCNC: 30.9 PG — SIGNIFICANT CHANGE UP (ref 27–34)
MCHC RBC-ENTMCNC: 31.3 PG — SIGNIFICANT CHANGE UP (ref 27–34)
MCHC RBC-ENTMCNC: 34.3 GM/DL — SIGNIFICANT CHANGE UP (ref 32–36)
MCHC RBC-ENTMCNC: 34.4 GM/DL — SIGNIFICANT CHANGE UP (ref 32–36)
MCV RBC AUTO: 90.1 FL — SIGNIFICANT CHANGE UP (ref 80–100)
MCV RBC AUTO: 91 FL — SIGNIFICANT CHANGE UP (ref 80–100)
MONOCYTES # BLD AUTO: 0.36 K/UL — SIGNIFICANT CHANGE UP (ref 0–0.9)
MONOCYTES NFR BLD AUTO: 11.5 % — SIGNIFICANT CHANGE UP (ref 2–14)
NEUTROPHILS # BLD AUTO: 1.74 K/UL — LOW (ref 1.8–7.4)
NEUTROPHILS NFR BLD AUTO: 55.9 % — SIGNIFICANT CHANGE UP (ref 43–77)
NRBC # BLD: 0 /100 WBCS — SIGNIFICANT CHANGE UP (ref 0–0)
NRBC # BLD: 0 /100 WBCS — SIGNIFICANT CHANGE UP (ref 0–0)
NRBC # FLD: 0 K/UL — SIGNIFICANT CHANGE UP (ref 0–0)
NRBC # FLD: 0 K/UL — SIGNIFICANT CHANGE UP (ref 0–0)
PHOSPHATE SERPL-MCNC: 1.6 MG/DL — LOW (ref 2.5–4.5)
PHOSPHATE SERPL-MCNC: 3 MG/DL — SIGNIFICANT CHANGE UP (ref 2.5–4.5)
PLATELET # BLD AUTO: 116 K/UL — LOW (ref 150–400)
PLATELET # BLD AUTO: 117 K/UL — LOW (ref 150–400)
POTASSIUM SERPL-MCNC: 2.9 MMOL/L — CRITICAL LOW (ref 3.5–5.3)
POTASSIUM SERPL-MCNC: 3.2 MMOL/L — LOW (ref 3.5–5.3)
POTASSIUM SERPL-MCNC: 3.3 MMOL/L — LOW (ref 3.5–5.3)
POTASSIUM SERPL-MCNC: 3.7 MMOL/L — SIGNIFICANT CHANGE UP (ref 3.5–5.3)
POTASSIUM SERPL-SCNC: 2.9 MMOL/L — CRITICAL LOW (ref 3.5–5.3)
POTASSIUM SERPL-SCNC: 3.2 MMOL/L — LOW (ref 3.5–5.3)
POTASSIUM SERPL-SCNC: 3.3 MMOL/L — LOW (ref 3.5–5.3)
POTASSIUM SERPL-SCNC: 3.7 MMOL/L — SIGNIFICANT CHANGE UP (ref 3.5–5.3)
PROT SERPL-MCNC: 6.4 G/DL — SIGNIFICANT CHANGE UP (ref 6–8.3)
RBC # BLD: 4.63 M/UL — SIGNIFICANT CHANGE UP (ref 3.8–5.2)
RBC # BLD: 4.79 M/UL — SIGNIFICANT CHANGE UP (ref 3.8–5.2)
RBC # FLD: 12.7 % — SIGNIFICANT CHANGE UP (ref 10.3–14.5)
RBC # FLD: 12.8 % — SIGNIFICANT CHANGE UP (ref 10.3–14.5)
SODIUM SERPL-SCNC: 128 MMOL/L — LOW (ref 135–145)
SODIUM SERPL-SCNC: 129 MMOL/L — LOW (ref 135–145)
SODIUM SERPL-SCNC: 131 MMOL/L — LOW (ref 135–145)
SODIUM SERPL-SCNC: 131 MMOL/L — LOW (ref 135–145)
SPECIMEN SOURCE: SIGNIFICANT CHANGE UP
WBC # BLD: 3.12 K/UL — LOW (ref 3.8–10.5)
WBC # BLD: 3.21 K/UL — LOW (ref 3.8–10.5)
WBC # FLD AUTO: 3.12 K/UL — LOW (ref 3.8–10.5)
WBC # FLD AUTO: 3.21 K/UL — LOW (ref 3.8–10.5)

## 2023-05-21 PROCEDURE — 99233 SBSQ HOSP IP/OBS HIGH 50: CPT

## 2023-05-21 RX ORDER — ACETAMINOPHEN 500 MG
1000 TABLET ORAL ONCE
Refills: 0 | Status: COMPLETED | OUTPATIENT
Start: 2023-05-21 | End: 2023-05-21

## 2023-05-21 RX ORDER — POTASSIUM CHLORIDE 20 MEQ
40 PACKET (EA) ORAL EVERY 4 HOURS
Refills: 0 | Status: DISCONTINUED | OUTPATIENT
Start: 2023-05-21 | End: 2023-06-08

## 2023-05-21 RX ORDER — POTASSIUM CHLORIDE 20 MEQ
10 PACKET (EA) ORAL
Refills: 0 | Status: COMPLETED | OUTPATIENT
Start: 2023-05-21 | End: 2023-05-21

## 2023-05-21 RX ORDER — POTASSIUM CHLORIDE 20 MEQ
40 PACKET (EA) ORAL ONCE
Refills: 0 | Status: COMPLETED | OUTPATIENT
Start: 2023-05-21 | End: 2023-05-21

## 2023-05-21 RX ORDER — MAGNESIUM SULFATE 500 MG/ML
2 VIAL (ML) INJECTION ONCE
Refills: 0 | Status: COMPLETED | OUTPATIENT
Start: 2023-05-21 | End: 2023-05-21

## 2023-05-21 RX ORDER — POTASSIUM CHLORIDE 20 MEQ
40 PACKET (EA) ORAL EVERY 4 HOURS
Refills: 0 | Status: COMPLETED | OUTPATIENT
Start: 2023-05-21 | End: 2023-05-21

## 2023-05-21 RX ORDER — SODIUM CHLORIDE 9 MG/ML
1000 INJECTION INTRAMUSCULAR; INTRAVENOUS; SUBCUTANEOUS
Refills: 0 | Status: DISCONTINUED | OUTPATIENT
Start: 2023-05-21 | End: 2023-05-26

## 2023-05-21 RX ORDER — POTASSIUM CHLORIDE 20 MEQ
40 PACKET (EA) ORAL EVERY 4 HOURS
Refills: 0 | Status: DISCONTINUED | OUTPATIENT
Start: 2023-05-21 | End: 2023-05-21

## 2023-05-21 RX ADMIN — HEPARIN SODIUM 5000 UNIT(S): 5000 INJECTION INTRAVENOUS; SUBCUTANEOUS at 06:18

## 2023-05-21 RX ADMIN — Medication 50 MILLIGRAM(S): at 14:03

## 2023-05-21 RX ADMIN — Medication 25 GRAM(S): at 12:24

## 2023-05-21 RX ADMIN — Medication 40 MILLIEQUIVALENT(S): at 14:03

## 2023-05-21 RX ADMIN — PIPERACILLIN AND TAZOBACTAM 25 GRAM(S): 4; .5 INJECTION, POWDER, LYOPHILIZED, FOR SOLUTION INTRAVENOUS at 09:15

## 2023-05-21 RX ADMIN — SODIUM CHLORIDE 50 MILLILITER(S): 9 INJECTION INTRAMUSCULAR; INTRAVENOUS; SUBCUTANEOUS at 18:17

## 2023-05-21 RX ADMIN — Medication 1000 MILLIGRAM(S): at 23:06

## 2023-05-21 RX ADMIN — Medication 75 MILLIGRAM(S): at 14:03

## 2023-05-21 RX ADMIN — Medication 40 MILLIEQUIVALENT(S): at 18:20

## 2023-05-21 RX ADMIN — HEPARIN SODIUM 5000 UNIT(S): 5000 INJECTION INTRAVENOUS; SUBCUTANEOUS at 21:36

## 2023-05-21 RX ADMIN — PIPERACILLIN AND TAZOBACTAM 25 GRAM(S): 4; .5 INJECTION, POWDER, LYOPHILIZED, FOR SOLUTION INTRAVENOUS at 18:24

## 2023-05-21 RX ADMIN — Medication 40 MILLIEQUIVALENT(S): at 00:49

## 2023-05-21 RX ADMIN — Medication 40 MILLIEQUIVALENT(S): at 21:36

## 2023-05-21 RX ADMIN — Medication 1000 MILLIGRAM(S): at 22:06

## 2023-05-21 RX ADMIN — PIPERACILLIN AND TAZOBACTAM 25 GRAM(S): 4; .5 INJECTION, POWDER, LYOPHILIZED, FOR SOLUTION INTRAVENOUS at 00:49

## 2023-05-21 NOTE — PROGRESS NOTE ADULT - ASSESSMENT
59yo Female with MHx of HTN, MS a/w sepsis likely due to gastroenteritis of infectious etiology c/b hypotension and SUSAN; Found to have transaminitis; r/o bacteremia;

## 2023-05-21 NOTE — PROGRESS NOTE ADULT - PROBLEM SELECTOR PLAN 4
Overall better, 128 on last check.  Likely multifactorial due to sepsis, SIADH, possible Amitriptyline side-effect   s/p 2L NS and 1L LR in ED  -Hold Amitriptyline   -Monitor sodium daily   -TSH: WNL  -Control nausea: Zofran  IV PRN   -Continue IVF for now. Encourage PO intake (diet advanced today).

## 2023-05-21 NOTE — PROGRESS NOTE ADULT - PROBLEM SELECTOR PLAN 1
Severe sepsis appears to have resolved. Normotensive. HR better. No fever in >24h.  Due to likely gastroenteritis and possible UTI;   -Cx NEG to date. GI PCR neg. No obv sources.  -Cont Zosyn for now.  -Can transition to PO in next 24-48h to complete 5d course if still no obv sources.  -F/u C diff. Though no more diarrhea.

## 2023-05-21 NOTE — PROGRESS NOTE ADULT - PROBLEM SELECTOR PLAN 3
Worsening chronic transaminitis, x20 upper normal level now;   Reports elevated liver enzymes in "200s" since February, which were investigated by her gastroenterologist (unclear etiology); Reports recent Tylenol use but not exceeding max daily dose;   -Hold Amitriptyline  -APAP level neg.  -Seriologies as per hepatology sent. F/u.  -Abd US showing fatty liver.  -Appreciate hepatology recs

## 2023-05-21 NOTE — PROGRESS NOTE ADULT - SUBJECTIVE AND OBJECTIVE BOX
[facility] Division of Hospital Medicine  Landon GoldsmithJovan) MD Usama  Pager [phone]    SUBJECTIVE:  Chief complaint: r/o gastroenteritis.    Pt seen and evaluated at bedside this AM. No o/n events. Doing well. Slept better. No pain. No recent diarrhea. No new fevers.       ROS: All systems negative except as noted.      Vital Signs Last 24 Hrs  T(C): 36.8 (21 May 2023 06:00), Max: 37 (20 May 2023 18:15)  T(F): 98.3 (21 May 2023 06:00), Max: 98.6 (20 May 2023 18:15)  HR: 98 (21 May 2023 06:00) (82 - 101)  BP: 120/85 (21 May 2023 06:00) (92/52 - 127/80)  BP(mean): --  RR: 18 (21 May 2023 06:00) (17 - 19)  SpO2: 98% (21 May 2023 06:00) (94% - 98%)    Parameters below as of 21 May 2023 06:00  Patient On (Oxygen Delivery Method): nasal cannula  O2 Flow (L/min): 2        PHYSICAL EXAM:  Gen- In bed, comfortable, NAD. Nontoxic appearing.   EMNT- Dry MM.  Chest- CTAB, good effort. No r/r/w.  CVS- RRR, S1S2, no g/r/m.  GI- Soft abd, NT, ND, +BSx4  Ext- No C/C>      MEDICATION:  MEDICATIONS  (STANDING):  amitriptyline 50 milliGRAM(s) Oral daily  heparin   Injectable 5000 Unit(s) SubCutaneous every 8 hours  magnesium sulfate  IVPB 2 Gram(s) IV Intermittent once  pantoprazole  Injectable 40 milliGRAM(s) IV Push daily  piperacillin/tazobactam IVPB.. 3.375 Gram(s) IV Intermittent every 8 hours  potassium chloride    Tablet ER 40 milliEquivalent(s) Oral every 4 hours  potassium chloride  10 mEq/100 mL IVPB 10 milliEquivalent(s) IV Intermittent every 1 hour  pregabalin 75 milliGRAM(s) Oral daily  sodium chloride 0.9%. 1000 milliLiter(s) (50 mL/Hr) IV Continuous <Continuous>    MEDICATIONS  (PRN):  acetaminophen     Tablet .. 650 milliGRAM(s) Oral every 6 hours PRN Temp greater or equal to 38C (100.4F)  ibuprofen  Tablet. 400 milliGRAM(s) Oral every 8 hours PRN Temp greater or equal to 38C (100.4F)  melatonin 3 milliGRAM(s) Oral at bedtime PRN Insomnia  ondansetron Injectable 4 milliGRAM(s) IV Push every 8 hours PRN Nausea and/or Vomiting            LABORATORY:                          14.3   3.21  )-----------( 116      ( 21 May 2023 10:15 )             41.7     -    128<L>  |  91<L>  |  12  ----------------------------<  100<H>  2.9<LL>   |  23  |  0.74    Ca    8.3<L>      21 May 2023 10:15  Phos  3.0     -  Mg     1.90         TPro  6.4  /  Alb  3.7  /  TBili  0.5  /  DBili  x   /  AST  524<H>  /  ALT  463<H>  /  AlkPhos  79  05-21    PT/INR - ( 19 May 2023 22:30 )   PT: 14.1 sec;   INR: 1.21 ratio         PTT - ( 19 May 2023 22:30 )  PTT:34.5 sec  Urinalysis Basic - ( 19 May 2023 22:05 )    Color: Yellow / Appearance: Slightly Turbid / S.021 / pH: x  Gluc: x / Ketone: Negative  / Bili: Negative / Urobili: 3 mg/dL   Blood: x / Protein: 100 mg/dL / Nitrite: Negative   Leuk Esterase: Large / RBC: 6 /HPF / WBC 53 /HPF   Sq Epi: x / Non Sq Epi: x / Bacteria: Many            SARS-CoV-2: NotDetec (19 May 2023 22:30)

## 2023-05-22 DIAGNOSIS — R82.90 UNSPECIFIED ABNORMAL FINDINGS IN URINE: ICD-10-CM

## 2023-05-22 LAB
ALBUMIN SERPL ELPH-MCNC: 3.9 G/DL — SIGNIFICANT CHANGE UP (ref 3.3–5)
ALP SERPL-CCNC: 88 U/L — SIGNIFICANT CHANGE UP (ref 40–120)
ALT FLD-CCNC: 429 U/L — HIGH (ref 4–33)
ANION GAP SERPL CALC-SCNC: 11 MMOL/L — SIGNIFICANT CHANGE UP (ref 7–14)
ANION GAP SERPL CALC-SCNC: 12 MMOL/L — SIGNIFICANT CHANGE UP (ref 7–14)
ANISOCYTOSIS BLD QL: SLIGHT — SIGNIFICANT CHANGE UP
AST SERPL-CCNC: 370 U/L — HIGH (ref 4–32)
BASOPHILS # BLD AUTO: 0 K/UL — SIGNIFICANT CHANGE UP (ref 0–0.2)
BASOPHILS NFR BLD AUTO: 0 % — SIGNIFICANT CHANGE UP (ref 0–2)
BILIRUB SERPL-MCNC: 0.6 MG/DL — SIGNIFICANT CHANGE UP (ref 0.2–1.2)
BUN SERPL-MCNC: 11 MG/DL — SIGNIFICANT CHANGE UP (ref 7–23)
BUN SERPL-MCNC: 9 MG/DL — SIGNIFICANT CHANGE UP (ref 7–23)
CALCIUM SERPL-MCNC: 7.6 MG/DL — LOW (ref 8.4–10.5)
CALCIUM SERPL-MCNC: 8.4 MG/DL — SIGNIFICANT CHANGE UP (ref 8.4–10.5)
CHLORIDE SERPL-SCNC: 94 MMOL/L — LOW (ref 98–107)
CHLORIDE SERPL-SCNC: 95 MMOL/L — LOW (ref 98–107)
CMV DNA CSF QL NAA+PROBE: SIGNIFICANT CHANGE UP IU/ML
CMV DNA SPEC NAA+PROBE-LOG#: SIGNIFICANT CHANGE UP LOG10IU/ML
CO2 SERPL-SCNC: 20 MMOL/L — LOW (ref 22–31)
CO2 SERPL-SCNC: 25 MMOL/L — SIGNIFICANT CHANGE UP (ref 22–31)
CREAT SERPL-MCNC: 0.6 MG/DL — SIGNIFICANT CHANGE UP (ref 0.5–1.3)
CREAT SERPL-MCNC: 0.81 MG/DL — SIGNIFICANT CHANGE UP (ref 0.5–1.3)
CULTURE RESULTS: SIGNIFICANT CHANGE UP
EBV EA AB SER IA-ACNC: <5 U/ML — SIGNIFICANT CHANGE UP
EBV EA AB TITR SER IF: POSITIVE
EBV EA IGG SER-ACNC: NEGATIVE — SIGNIFICANT CHANGE UP
EBV NA IGG SER IA-ACNC: >600 U/ML — HIGH
EBV PATRN SPEC IB-IMP: SIGNIFICANT CHANGE UP
EBV VCA IGG AVIDITY SER QL IA: POSITIVE
EBV VCA IGM SER IA-ACNC: 160 U/ML — HIGH
EBV VCA IGM SER IA-ACNC: <10 U/ML — SIGNIFICANT CHANGE UP
EBV VCA IGM TITR FLD: NEGATIVE — SIGNIFICANT CHANGE UP
EGFR: 104 ML/MIN/1.73M2 — SIGNIFICANT CHANGE UP
EGFR: 84 ML/MIN/1.73M2 — SIGNIFICANT CHANGE UP
EOSINOPHIL # BLD AUTO: 0.05 K/UL — SIGNIFICANT CHANGE UP (ref 0–0.5)
EOSINOPHIL NFR BLD AUTO: 2 % — SIGNIFICANT CHANGE UP (ref 0–6)
GIANT PLATELETS BLD QL SMEAR: PRESENT — SIGNIFICANT CHANGE UP
GLUCOSE SERPL-MCNC: 102 MG/DL — HIGH (ref 70–99)
GLUCOSE SERPL-MCNC: 103 MG/DL — HIGH (ref 70–99)
HCT VFR BLD CALC: 46.5 % — HIGH (ref 34.5–45)
HGB BLD-MCNC: 15.7 G/DL — HIGH (ref 11.5–15.5)
IANC: 1.56 K/UL — LOW (ref 1.8–7.4)
INR BLD: 1.06 RATIO — SIGNIFICANT CHANGE UP (ref 0.88–1.16)
LYMPHOCYTES # BLD AUTO: 0.57 K/UL — LOW (ref 1–3.3)
LYMPHOCYTES # BLD AUTO: 21 % — SIGNIFICANT CHANGE UP (ref 13–44)
MACROCYTES BLD QL: SLIGHT — SIGNIFICANT CHANGE UP
MAGNESIUM SERPL-MCNC: 1.8 MG/DL — SIGNIFICANT CHANGE UP (ref 1.6–2.6)
MAGNESIUM SERPL-MCNC: 2.4 MG/DL — SIGNIFICANT CHANGE UP (ref 1.6–2.6)
MANUAL SMEAR VERIFICATION: SIGNIFICANT CHANGE UP
MCHC RBC-ENTMCNC: 31.2 PG — SIGNIFICANT CHANGE UP (ref 27–34)
MCHC RBC-ENTMCNC: 33.8 GM/DL — SIGNIFICANT CHANGE UP (ref 32–36)
MCV RBC AUTO: 92.3 FL — SIGNIFICANT CHANGE UP (ref 80–100)
MELD SCORE WITH DIALYSIS: 25 POINTS — SIGNIFICANT CHANGE UP
MELD SCORE WITHOUT DIALYSIS: 7 POINTS — SIGNIFICANT CHANGE UP
MONOCYTES # BLD AUTO: 0.22 K/UL — SIGNIFICANT CHANGE UP (ref 0–0.9)
MONOCYTES NFR BLD AUTO: 8 % — SIGNIFICANT CHANGE UP (ref 2–14)
NEUTROPHILS # BLD AUTO: 1.83 K/UL — SIGNIFICANT CHANGE UP (ref 1.8–7.4)
NEUTROPHILS NFR BLD AUTO: 54 % — SIGNIFICANT CHANGE UP (ref 43–77)
NEUTS BAND # BLD: 13 % — CRITICAL HIGH (ref 0–6)
NRBC # BLD: 0 /100 — SIGNIFICANT CHANGE UP (ref 0–0)
PHOSPHATE SERPL-MCNC: 2.2 MG/DL — LOW (ref 2.5–4.5)
PHOSPHATE SERPL-MCNC: 2.7 MG/DL — SIGNIFICANT CHANGE UP (ref 2.5–4.5)
PLAT MORPH BLD: NORMAL — SIGNIFICANT CHANGE UP
PLATELET # BLD AUTO: 120 K/UL — LOW (ref 150–400)
PLATELET COUNT - ESTIMATE: ABNORMAL
POLYCHROMASIA BLD QL SMEAR: SLIGHT — SIGNIFICANT CHANGE UP
POTASSIUM SERPL-MCNC: 3.4 MMOL/L — LOW (ref 3.5–5.3)
POTASSIUM SERPL-MCNC: 3.9 MMOL/L — SIGNIFICANT CHANGE UP (ref 3.5–5.3)
POTASSIUM SERPL-SCNC: 3.4 MMOL/L — LOW (ref 3.5–5.3)
POTASSIUM SERPL-SCNC: 3.9 MMOL/L — SIGNIFICANT CHANGE UP (ref 3.5–5.3)
PROT SERPL-MCNC: 6.9 G/DL — SIGNIFICANT CHANGE UP (ref 6–8.3)
PROTHROM AB SERPL-ACNC: 12.3 SEC — SIGNIFICANT CHANGE UP (ref 10.5–13.4)
RBC # BLD: 5.04 M/UL — SIGNIFICANT CHANGE UP (ref 3.8–5.2)
RBC # FLD: 13.2 % — SIGNIFICANT CHANGE UP (ref 10.3–14.5)
RBC BLD AUTO: ABNORMAL
SODIUM SERPL-SCNC: 127 MMOL/L — LOW (ref 135–145)
SODIUM SERPL-SCNC: 130 MMOL/L — LOW (ref 135–145)
SPECIMEN SOURCE: SIGNIFICANT CHANGE UP
VARIANT LYMPHS # BLD: 2 % — SIGNIFICANT CHANGE UP (ref 0–6)
WBC # BLD: 2.73 K/UL — LOW (ref 3.8–10.5)
WBC # FLD AUTO: 2.73 K/UL — LOW (ref 3.8–10.5)

## 2023-05-22 PROCEDURE — 99232 SBSQ HOSP IP/OBS MODERATE 35: CPT

## 2023-05-22 PROCEDURE — 99222 1ST HOSP IP/OBS MODERATE 55: CPT

## 2023-05-22 RX ORDER — CEFTRIAXONE 500 MG/1
1000 INJECTION, POWDER, FOR SOLUTION INTRAMUSCULAR; INTRAVENOUS EVERY 24 HOURS
Refills: 0 | Status: COMPLETED | OUTPATIENT
Start: 2023-05-22 | End: 2023-05-24

## 2023-05-22 RX ORDER — POTASSIUM CHLORIDE 20 MEQ
40 PACKET (EA) ORAL EVERY 4 HOURS
Refills: 0 | Status: COMPLETED | OUTPATIENT
Start: 2023-05-22 | End: 2023-05-22

## 2023-05-22 RX ORDER — ACETAMINOPHEN 500 MG
325 TABLET ORAL ONCE
Refills: 0 | Status: COMPLETED | OUTPATIENT
Start: 2023-05-22 | End: 2023-05-22

## 2023-05-22 RX ADMIN — PIPERACILLIN AND TAZOBACTAM 25 GRAM(S): 4; .5 INJECTION, POWDER, LYOPHILIZED, FOR SOLUTION INTRAVENOUS at 08:12

## 2023-05-22 RX ADMIN — Medication 325 MILLIGRAM(S): at 23:33

## 2023-05-22 RX ADMIN — HEPARIN SODIUM 5000 UNIT(S): 5000 INJECTION INTRAVENOUS; SUBCUTANEOUS at 13:31

## 2023-05-22 RX ADMIN — Medication 650 MILLIGRAM(S): at 23:23

## 2023-05-22 RX ADMIN — Medication 325 MILLIGRAM(S): at 22:33

## 2023-05-22 RX ADMIN — Medication 650 MILLIGRAM(S): at 22:23

## 2023-05-22 RX ADMIN — PIPERACILLIN AND TAZOBACTAM 25 GRAM(S): 4; .5 INJECTION, POWDER, LYOPHILIZED, FOR SOLUTION INTRAVENOUS at 16:06

## 2023-05-22 RX ADMIN — PIPERACILLIN AND TAZOBACTAM 25 GRAM(S): 4; .5 INJECTION, POWDER, LYOPHILIZED, FOR SOLUTION INTRAVENOUS at 00:08

## 2023-05-22 RX ADMIN — SODIUM CHLORIDE 50 MILLILITER(S): 9 INJECTION INTRAMUSCULAR; INTRAVENOUS; SUBCUTANEOUS at 21:55

## 2023-05-22 RX ADMIN — Medication 50 MILLIGRAM(S): at 11:41

## 2023-05-22 RX ADMIN — Medication 75 MILLIGRAM(S): at 11:41

## 2023-05-22 RX ADMIN — CEFTRIAXONE 100 MILLIGRAM(S): 500 INJECTION, POWDER, FOR SOLUTION INTRAMUSCULAR; INTRAVENOUS at 17:44

## 2023-05-22 RX ADMIN — Medication 40 MILLIEQUIVALENT(S): at 13:30

## 2023-05-22 RX ADMIN — Medication 40 MILLIEQUIVALENT(S): at 09:24

## 2023-05-22 RX ADMIN — HEPARIN SODIUM 5000 UNIT(S): 5000 INJECTION INTRAVENOUS; SUBCUTANEOUS at 21:54

## 2023-05-22 RX ADMIN — HEPARIN SODIUM 5000 UNIT(S): 5000 INJECTION INTRAVENOUS; SUBCUTANEOUS at 05:38

## 2023-05-22 NOTE — PROGRESS NOTE ADULT - ASSESSMENT
57yo Female with MHx of HTN, MS a/w sepsis likely due to gastroenteritis of infectious etiology c/b hypotension and SUSAN; Found to have transaminitis

## 2023-05-22 NOTE — PROGRESS NOTE ADULT - PROBLEM SELECTOR PLAN 1
Remains febrile, last fever last night   Unclear etiology, thought to be d/t gastroenteritis but diarrhea resolving and still remains febrile   -blood and urine cultures NEG to date. GI PCR neg  -CXR w/ clear lungs   -RVP neg   -Empiric Zosyn  -check LDH and Ferritin  -ID consult

## 2023-05-22 NOTE — PROGRESS NOTE ADULT - PROBLEM SELECTOR PLAN 3
Worsening chronic transaminitis, x20 upper normal level now;   Reports elevated liver enzymes in "200s" since February, which were investigated by her gastroenterologist (unclear etiology); Reports recent Tylenol use but not exceeding max daily dose  -APAP level neg.  -Serologies as per hepatology sent  -Abd US showing fatty liver  -Appreciate hepatology recs

## 2023-05-22 NOTE — PROGRESS NOTE ADULT - SUBJECTIVE AND OBJECTIVE BOX
PROGRESS NOTE:     Patient is a 58y old  Female who presents with a chief complaint of Fever, nausea, vomiting (21 May 2023 12:01)      SUBJECTIVE / OVERNIGHT EVENTS: Pt feels weak, fevers noted.     ADDITIONAL REVIEW OF SYSTEMS:    MEDICATIONS  (STANDING):  amitriptyline 50 milliGRAM(s) Oral daily  heparin   Injectable 5000 Unit(s) SubCutaneous every 8 hours  piperacillin/tazobactam IVPB.. 3.375 Gram(s) IV Intermittent every 8 hours  potassium chloride    Tablet ER 40 milliEquivalent(s) Oral every 4 hours  pregabalin 75 milliGRAM(s) Oral daily  sodium chloride 0.9%. 1000 milliLiter(s) (50 mL/Hr) IV Continuous <Continuous>    MEDICATIONS  (PRN):  acetaminophen     Tablet .. 650 milliGRAM(s) Oral every 6 hours PRN Temp greater or equal to 38C (100.4F)  ibuprofen  Tablet. 400 milliGRAM(s) Oral every 8 hours PRN Temp greater or equal to 38C (100.4F)  melatonin 3 milliGRAM(s) Oral at bedtime PRN Insomnia  ondansetron Injectable 4 milliGRAM(s) IV Push every 8 hours PRN Nausea and/or Vomiting      CAPILLARY BLOOD GLUCOSE        I&O's Summary    21 May 2023 07:01  -  22 May 2023 07:00  --------------------------------------------------------  IN: 1480 mL / OUT: 0 mL / NET: 1480 mL        PHYSICAL EXAM:  Vital Signs Last 24 Hrs  T(C): 37.4 (22 May 2023 10:35), Max: 38 (21 May 2023 21:43)  T(F): 99.3 (22 May 2023 10:35), Max: 100.4 (21 May 2023 21:43)  HR: 105 (22 May 2023 10:35) (103 - 105)  BP: 106/62 (22 May 2023 10:35) (106/62 - 125/85)  BP(mean): --  RR: 18 (22 May 2023 10:35) (17 - 18)  SpO2: 97% (22 May 2023 10:35) (97% - 100%)    Parameters below as of 22 May 2023 10:35  Patient On (Oxygen Delivery Method): nasal cannula  O2 Flow (L/min): 2      CONSTITUTIONAL: NAD  RESPIRATORY: Normal respiratory effort; lungs are clear to auscultation bilaterally  CARDIOVASCULAR: Regular rate and rhythm, normal S1 and S2, no murmur/rub/gallop; No lower extremity edema; Peripheral pulses are 2+ bilaterally  ABDOMEN: Nontender to palpation, normoactive bowel sounds, no rebound/guarding; No hepatosplenomegaly  MUSCLOSKELETAL: no clubbing or cyanosis of digits; no joint swelling or tenderness to palpation  PSYCH: A+O to person, place, and time; affect appropriate    LABS:                        15.7   2.73  )-----------( 120      ( 22 May 2023 07:00 )             46.5     05-22    130<L>  |  94<L>  |  11  ----------------------------<  103<H>  3.4<L>   |  25  |  0.81    Ca    8.4      22 May 2023 07:00  Phos  2.7     05-22  Mg     2.40     05-22    TPro  6.9  /  Alb  3.9  /  TBili  0.6  /  DBili  x   /  AST  370<H>  /  ALT  429<H>  /  AlkPhos  88  05-22    PT/INR - ( 22 May 2023 07:00 )   PT: 12.3 sec;   INR: 1.06 ratio                   Culture - Stool (collected 20 May 2023 12:07)  Source: .Stool Feces  Preliminary Report (21 May 2023 22:57):    No enteric pathogens to date: Final culture pending    Culture - Blood (collected 19 May 2023 22:35)  Source: .Blood Blood-Peripheral  Preliminary Report (21 May 2023 03:01):    No growth to date.    Culture - Blood (collected 19 May 2023 22:30)  Source: .Blood Blood-Peripheral  Preliminary Report (21 May 2023 03:01):    No growth to date.    Culture - Urine (collected 19 May 2023 22:05)  Source: Clean Catch Clean Catch (Midstream)  Final Report (21 May 2023 00:00):    No growth        RADIOLOGY & ADDITIONAL TESTS:  Results Reviewed:   Imaging Personally Reviewed:  Electrocardiogram Personally Reviewed:    COORDINATION OF CARE:  Care Discussed with Consultants/Other Providers [Y/N]:  Prior or Outpatient Records Reviewed [Y/N]:

## 2023-05-22 NOTE — PROGRESS NOTE ADULT - PROBLEM SELECTOR PLAN 4
Likely multifactorial due to sepsis and hypovolemia   s/p 2L NS and 1L LR in ED  -Monitor sodium daily   -TSH: WNL  -Control nausea: Zofran  IV PRN   -Continue IVF for now. Encourage PO intake

## 2023-05-22 NOTE — CONSULT NOTE ADULT - SUBJECTIVE AND OBJECTIVE BOX
HPI:  57yo Female with MHx of HTN, MS c/o 3 days of generalized weakness, fever and chills with associated nbnb vomiting. Also reports 2 episodes of nb diarrhea today and yesterday with associated moderate Abd discomfort in the epigastric region. States has been taking Tylenol for fever and gen body pain    Prior to presenting, she stated she began having loose BMS- one daily.   She denies dysuria.   She states she has felt weak/ not normal.  She states that she has had por balance and gait instability for a while    She denies abd pain but did report pain when in ED.        (20 May 2023 03:46)      PAST MEDICAL & SURGICAL HISTORY:  MS (multiple sclerosis)  -diagnosed 2018 with "brain fog" and balance issues  -episode of diploplia at 20 years old      HTN (hypertension)      No significant past surgical history          Allergies    No Known Allergies    Intolerances        ANTIMICROBIALS:  piperacillin/tazobactam IVPB.. 3.375 every 8 hours      OTHER MEDS:  acetaminophen     Tablet .. 650 milliGRAM(s) Oral every 6 hours PRN  amitriptyline 50 milliGRAM(s) Oral daily  heparin   Injectable 5000 Unit(s) SubCutaneous every 8 hours  ibuprofen  Tablet. 400 milliGRAM(s) Oral every 8 hours PRN  melatonin 3 milliGRAM(s) Oral at bedtime PRN  ondansetron Injectable 4 milliGRAM(s) IV Push every 8 hours PRN  potassium chloride    Tablet ER 40 milliEquivalent(s) Oral every 4 hours  pregabalin 75 milliGRAM(s) Oral daily  sodium chloride 0.9%. 1000 milliLiter(s) IV Continuous <Continuous>      SOCIAL HISTORY:    From [facility]  Lives with   no sick contacts  no tobacco  rare alcohol    FAMILY HISTORY:  FH: cirrhosis (Mother)    FH: esophageal cancer (Father)        REVIEW OF SYSTEMS  [  ] ROS unobtainable because:    [x  ] All other systems negative except as noted below:	    Constitutional:  x[ ] fever [ ] chills  [ ] weight loss  [ x] weakness  Skin:  [ ] rash [ ] phlebitis	  Eyes: [ ] icterus [ ] pain  [ ] discharge	  ENMT: [ ] sore throat  [ ] thrush [ ] ulcers [ ] exudates  Respiratory: [ ] dyspnea [ ] hemoptysis [ ] cough [ ] sputum	  Cardiovascular:  [ ] chest pain [ ] palpitations [ ] edema	  Gastrointestinal:  [ ] nausea [ ] vomiting [ ] diarrhea [ ] constipation [ ] pain	  Genitourinary:  [ ] dysuria [ ] frequency [ ] hematuria [ ] discharge [ ] flank pain  [ ] incontinence  Musculoskeletal:  [ ] myalgias [ ] arthralgias [ ] arthritis  [ ] back pain  Neurological:  [ ] headache [ ] seizures  [ ] confusion/altered mental status  Psychiatric:  [ ] anxiety [ ] depression	  Hematology/Lymphatics:  [ ] lymphadenopathy  Endocrine:  [ ] adrenal [ ] thyroid  Allergic/Immunologic:	 [ ] transplant [ ] seasonal    PHYSICAL EXAM:  General: [x ] non-toxic  HEAD/EYES: [ ] PERRL [ x] white sclera [ ] icterus  ENT:  [ ] normal [x ] supple [ ] thrush [ ] pharyngeal exudate  Cardiovascular:   [ ] murmur x[ ] normal [ ] PPM/AICD  Respiratory:  x[ ] clear to ausculation bilaterally  GI:  x[ ] soft, non-tender, normal bowel sounds  :  [ ] chadwick [ x] no CVA tenderness   Musculoskeletal:  [ ] no synovitis  Neurologic:  [ ] non-focal exam   Skin:  [ x] no rash  Lymph: [x ] no lymphadenopathy  Psychiatric:  [x ] appropriate affect [ ] alert & oriented  Lines:  [x ] no phlebitis [ ] central line          Drug Dosing Weight  Height (cm): 170.2 (20 May 2023 05:57)  Weight (kg): 83.915 (20 May 2023 05:57)  BMI (kg/m2): 29 (20 May 2023 05:57)  BSA (m2): 1.96 (20 May 2023 05:57)    Vital Signs Last 24 Hrs  T(F): 99.3 (05-22-23 @ 10:35), Max: 103.6 (05-19-23 @ 22:00)    Vital Signs Last 24 Hrs  HR: 105 (05-22-23 @ 10:35) (103 - 105)  BP: 106/62 (05-22-23 @ 10:35) (106/62 - 125/85)  RR: 18 (05-22-23 @ 10:35)  SpO2: 97% (05-22-23 @ 10:35) (97% - 100%)  Wt(kg): --                          15.7   2.73  )-----------( 120      ( 22 May 2023 07:00 )             46.5       05-22    130<L>  |  94<L>  |  11  ----------------------------<  103<H>  3.4<L>   |  25  |  0.81    Ca    8.4      22 May 2023 07:00  Phos  2.7     05-22  Mg     2.40     05-22    TPro  6.9  /  Alb  3.9  /  TBili  0.6  /  DBili  x   /  AST  370<H>  /  ALT  429<H>  /  AlkPhos  88  05-22          MICROBIOLOGY:  RECENT CULTURES:  05-20 @ 12:07 .Stool Feces                No enteric pathogens to date: Final culture pending    05-19 @ 22:35 .Blood Blood-Peripheral                No growth to date.    05-19 @ 22:30 .Blood Blood-Peripheral                No growth to date.    05-19 @ 22:05 Clean Catch Clean Catch (Midstream)                No growth          RESPIRATORY CULTURES:    RADIOLOGY:  < from: US Abdomen Complete (05.20.23 @ 16:15) >  IMPRESSION:  Hepatic steatosis.    No sonographic evidence of cholelithiasis, acute cholecystitis or dilated   bile ducts.    Mild right hydronephrosis.  The right ureteral jet is not visualized.  A   right ureteral calculus or other cause for right ureteral obstruction is   not excluded. Underlying genitourinary infection and/or pyelonephritis   should be excluded based on clinical symptoms and laboratory values.    < end of copied text >

## 2023-05-22 NOTE — CONSULT NOTE ADULT - ASSESSMENT
58 year old with MS treated with Ocrelizumab presented with vomitting and fever.  States she has felt weak with gait instability.     1) transaminitis  Pt is EBNA positive- EBV unlikely a casue of acute presentation  CMV pcr is negative  Hep A , Hep B , hepC are nonreactive  hep E serology pending  Hepatology follow up     2) Cytopenia  bands noted today  Monitor CBC with differential  If not improving, heme eval    3) Elevated procalcitonin  at the time pt had SUSAN  Can repeat but it is a nonspecific marker and not clear it will help with a diagnosis  Urine and blood culture are without significant growth    4) pyuria  UA on 5/19 with 53 WBC; but denies dysuria  US with mild right hydroureter  Urine culture without growth  Change zosyn to ceftriaxone - stop on 5/25    5) FTT/ balance issues  Consider neurology eval  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

## 2023-05-23 LAB
ANION GAP SERPL CALC-SCNC: 10 MMOL/L — SIGNIFICANT CHANGE UP (ref 7–14)
B PERT DNA SPEC QL NAA+PROBE: SIGNIFICANT CHANGE UP
B PERT+PARAPERT DNA PNL SPEC NAA+PROBE: SIGNIFICANT CHANGE UP
BASOPHILS # BLD AUTO: 0.03 K/UL — SIGNIFICANT CHANGE UP (ref 0–0.2)
BASOPHILS NFR BLD AUTO: 1.4 % — SIGNIFICANT CHANGE UP (ref 0–2)
BORDETELLA PARAPERTUSSIS (RAPRVP): SIGNIFICANT CHANGE UP
BUN SERPL-MCNC: 8 MG/DL — SIGNIFICANT CHANGE UP (ref 7–23)
C PNEUM DNA SPEC QL NAA+PROBE: SIGNIFICANT CHANGE UP
CALCIUM SERPL-MCNC: 7.7 MG/DL — LOW (ref 8.4–10.5)
CHLORIDE SERPL-SCNC: 99 MMOL/L — SIGNIFICANT CHANGE UP (ref 98–107)
CO2 SERPL-SCNC: 26 MMOL/L — SIGNIFICANT CHANGE UP (ref 22–31)
CREAT SERPL-MCNC: 0.64 MG/DL — SIGNIFICANT CHANGE UP (ref 0.5–1.3)
EGFR: 102 ML/MIN/1.73M2 — SIGNIFICANT CHANGE UP
EOSINOPHIL # BLD AUTO: 0.11 K/UL — SIGNIFICANT CHANGE UP (ref 0–0.5)
EOSINOPHIL NFR BLD AUTO: 5.2 % — SIGNIFICANT CHANGE UP (ref 0–6)
FERRITIN SERPL-MCNC: HIGH NG/ML (ref 15–150)
FLUAV SUBTYP SPEC NAA+PROBE: SIGNIFICANT CHANGE UP
FLUBV RNA SPEC QL NAA+PROBE: SIGNIFICANT CHANGE UP
GLUCOSE SERPL-MCNC: 89 MG/DL — SIGNIFICANT CHANGE UP (ref 70–99)
HADV DNA SPEC QL NAA+PROBE: SIGNIFICANT CHANGE UP
HCOV 229E RNA SPEC QL NAA+PROBE: SIGNIFICANT CHANGE UP
HCOV HKU1 RNA SPEC QL NAA+PROBE: SIGNIFICANT CHANGE UP
HCOV NL63 RNA SPEC QL NAA+PROBE: SIGNIFICANT CHANGE UP
HCOV OC43 RNA SPEC QL NAA+PROBE: SIGNIFICANT CHANGE UP
HCT VFR BLD CALC: 36.9 % — SIGNIFICANT CHANGE UP (ref 34.5–45)
HEV IGM SER QL: NEGATIVE — SIGNIFICANT CHANGE UP
HGB BLD-MCNC: 12.5 G/DL — SIGNIFICANT CHANGE UP (ref 11.5–15.5)
HMPV RNA SPEC QL NAA+PROBE: SIGNIFICANT CHANGE UP
HPIV1 RNA SPEC QL NAA+PROBE: SIGNIFICANT CHANGE UP
HPIV2 RNA SPEC QL NAA+PROBE: SIGNIFICANT CHANGE UP
HPIV3 RNA SPEC QL NAA+PROBE: SIGNIFICANT CHANGE UP
HPIV4 RNA SPEC QL NAA+PROBE: SIGNIFICANT CHANGE UP
IANC: 0.75 K/UL — LOW (ref 1.8–7.4)
IMM GRANULOCYTES NFR BLD AUTO: 0.5 % — SIGNIFICANT CHANGE UP (ref 0–0.9)
LDH SERPL L TO P-CCNC: 414 U/L — HIGH (ref 135–225)
LYMPHOCYTES # BLD AUTO: 0.89 K/UL — LOW (ref 1–3.3)
LYMPHOCYTES # BLD AUTO: 42.4 % — SIGNIFICANT CHANGE UP (ref 13–44)
M PNEUMO DNA SPEC QL NAA+PROBE: SIGNIFICANT CHANGE UP
MAGNESIUM SERPL-MCNC: 2 MG/DL — SIGNIFICANT CHANGE UP (ref 1.6–2.6)
MCHC RBC-ENTMCNC: 32.4 PG — SIGNIFICANT CHANGE UP (ref 27–34)
MCHC RBC-ENTMCNC: 33.9 GM/DL — SIGNIFICANT CHANGE UP (ref 32–36)
MCV RBC AUTO: 95.6 FL — SIGNIFICANT CHANGE UP (ref 80–100)
MONOCYTES # BLD AUTO: 0.31 K/UL — SIGNIFICANT CHANGE UP (ref 0–0.9)
MONOCYTES NFR BLD AUTO: 14.8 % — HIGH (ref 2–14)
NEUTROPHILS # BLD AUTO: 0.75 K/UL — LOW (ref 1.8–7.4)
NEUTROPHILS NFR BLD AUTO: 35.7 % — LOW (ref 43–77)
NRBC # BLD: 0 /100 WBCS — SIGNIFICANT CHANGE UP (ref 0–0)
NRBC # FLD: 0 K/UL — SIGNIFICANT CHANGE UP (ref 0–0)
PHOSPHATE SERPL-MCNC: 2.9 MG/DL — SIGNIFICANT CHANGE UP (ref 2.5–4.5)
PLATELET # BLD AUTO: 101 K/UL — LOW (ref 150–400)
POTASSIUM SERPL-MCNC: 2.9 MMOL/L — CRITICAL LOW (ref 3.5–5.3)
POTASSIUM SERPL-SCNC: 2.9 MMOL/L — CRITICAL LOW (ref 3.5–5.3)
RAPID RVP RESULT: SIGNIFICANT CHANGE UP
RBC # BLD: 3.86 M/UL — SIGNIFICANT CHANGE UP (ref 3.8–5.2)
RBC # FLD: 13.3 % — SIGNIFICANT CHANGE UP (ref 10.3–14.5)
RSV RNA SPEC QL NAA+PROBE: SIGNIFICANT CHANGE UP
RV+EV RNA SPEC QL NAA+PROBE: SIGNIFICANT CHANGE UP
SARS-COV-2 RNA SPEC QL NAA+PROBE: SIGNIFICANT CHANGE UP
SODIUM SERPL-SCNC: 135 MMOL/L — SIGNIFICANT CHANGE UP (ref 135–145)
WBC # BLD: 2.1 K/UL — LOW (ref 3.8–10.5)
WBC # FLD AUTO: 2.1 K/UL — LOW (ref 3.8–10.5)

## 2023-05-23 PROCEDURE — 71045 X-RAY EXAM CHEST 1 VIEW: CPT | Mod: 26

## 2023-05-23 PROCEDURE — 99223 1ST HOSP IP/OBS HIGH 75: CPT | Mod: GC

## 2023-05-23 PROCEDURE — 99232 SBSQ HOSP IP/OBS MODERATE 35: CPT

## 2023-05-23 PROCEDURE — 74177 CT ABD & PELVIS W/CONTRAST: CPT | Mod: 26

## 2023-05-23 RX ORDER — ACETAMINOPHEN 500 MG
975 TABLET ORAL ONCE
Refills: 0 | Status: COMPLETED | OUTPATIENT
Start: 2023-05-23 | End: 2023-05-23

## 2023-05-23 RX ORDER — LOPERAMIDE HCL 2 MG
2 TABLET ORAL ONCE
Refills: 0 | Status: COMPLETED | OUTPATIENT
Start: 2023-05-23 | End: 2023-05-23

## 2023-05-23 RX ORDER — POTASSIUM CHLORIDE 20 MEQ
40 PACKET (EA) ORAL EVERY 4 HOURS
Refills: 0 | Status: COMPLETED | OUTPATIENT
Start: 2023-05-23 | End: 2023-05-23

## 2023-05-23 RX ADMIN — Medication 75 MILLIGRAM(S): at 12:30

## 2023-05-23 RX ADMIN — Medication 975 MILLIGRAM(S): at 22:13

## 2023-05-23 RX ADMIN — Medication 40 MILLIEQUIVALENT(S): at 09:43

## 2023-05-23 RX ADMIN — Medication 2 MILLIGRAM(S): at 18:27

## 2023-05-23 RX ADMIN — HEPARIN SODIUM 5000 UNIT(S): 5000 INJECTION INTRAVENOUS; SUBCUTANEOUS at 22:16

## 2023-05-23 RX ADMIN — Medication 50 MILLIGRAM(S): at 12:30

## 2023-05-23 RX ADMIN — Medication 40 MILLIEQUIVALENT(S): at 18:27

## 2023-05-23 RX ADMIN — HEPARIN SODIUM 5000 UNIT(S): 5000 INJECTION INTRAVENOUS; SUBCUTANEOUS at 14:17

## 2023-05-23 RX ADMIN — CEFTRIAXONE 100 MILLIGRAM(S): 500 INJECTION, POWDER, FOR SOLUTION INTRAMUSCULAR; INTRAVENOUS at 17:10

## 2023-05-23 RX ADMIN — Medication 975 MILLIGRAM(S): at 23:13

## 2023-05-23 RX ADMIN — Medication 40 MILLIEQUIVALENT(S): at 14:14

## 2023-05-23 NOTE — PROGRESS NOTE ADULT - ASSESSMENT
Impression:     #Elevated liver enzymes  Reported chronic elevation of AST/ALT in 200s since last year, attributed to genetic condition vs. Ocrevus infusion? Previously followed with GI in [place], no hepatology. Mother  of liver cirrhosis, non alcoholic, unknown etiology. Patient denies severe alcohol use, no new meds or herbal supplements.   - Differentials include superimposed ischemic liver injury in a chronic liver disease due to hypotension on admission. Unknown chronic liver disease at this time as she has been having elevated liver enzymes in the past. Could be related to autoimmune disease, serologies are pending. Never had prior liver biopsy. Ocrevus infusion known to cause diarrhea and colitis, however unlikely to cause liver toxicity.   - EBV Ag is positive.   - Other serologies - Hep B/C/A neg, CMV neg, HSV 1/2 neg.   - Liver enzymes trending down.   - US abd showed hepatic steatosis. CT A/P reported liver wnl.      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    - Hep E Ag pending.   - please send alpha-1 anti-trypsin (phenotype), ceruloplasm, AFP  - MAYELA, anti-smooth muscle antibody, immunoglobulins (IgG, IgM, IgA quantitative) for autoimmune etiologies pending  - can advance diet as tolerated.  - CMP, PT/INR, and CBC daily.   - Recommend patient to follow up in hepatology clinic within 1-2 weeks after hospital discharge.     All recommendations are tentative until note is attested by an attending.     Milton Griffith, PGY-4  Gastroenterology/Hepatology Fellow  Available on [employer] Teams  [phone] (Short Range Pager)  [phone] (Long Range Pager)    After 5pm, please contact the on-call GI fellow. [phone]

## 2023-05-23 NOTE — PROGRESS NOTE ADULT - PROBLEM SELECTOR PLAN 3
Worsening chronic transaminitis, x20 upper normal level now;   Reports elevated liver enzymes in "200s" since February, which were investigated by her gastroenterologist   -APAP level neg.  -Abd US showing fatty liver  -EBV Ag was positive, send EBV PCR  -send alpha-1 anti-trypsin (phenotype), ceruloplasm, AFP  -MAYELA, anti-smooth muscle antibody, immunoglobulins (IgG, IgM, IgA quantitative) for autoimmune etiologies pending  -Appreciate hepatology recs

## 2023-05-23 NOTE — PROGRESS NOTE ADULT - ASSESSMENT
59yo Female with MHx of HTN, MS a/w sepsis likely due to gastroenteritis of infectious etiology c/b hypotension and SUSAN; Found to have transaminitis

## 2023-05-23 NOTE — PROGRESS NOTE ADULT - ATTENDING COMMENTS
Elevated liver enzymes of unclear etiologies, given s/s suspect DILI vs. 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

## 2023-05-23 NOTE — PROGRESS NOTE ADULT - SUBJECTIVE AND OBJECTIVE BOX
PROGRESS NOTE:     Patient is a 58y old  Female who presents with a chief complaint of Fever, nausea, vomiting (23 May 2023 10:31)      SUBJECTIVE / OVERNIGHT EVENTS: Pt still w/ fevers.    ADDITIONAL REVIEW OF SYSTEMS:    MEDICATIONS  (STANDING):  amitriptyline 50 milliGRAM(s) Oral daily  cefTRIAXone   IVPB 1000 milliGRAM(s) IV Intermittent every 24 hours  heparin   Injectable 5000 Unit(s) SubCutaneous every 8 hours  potassium chloride    Tablet ER 40 milliEquivalent(s) Oral every 4 hours  potassium chloride    Tablet ER 40 milliEquivalent(s) Oral every 4 hours  pregabalin 75 milliGRAM(s) Oral daily  sodium chloride 0.9%. 1000 milliLiter(s) (50 mL/Hr) IV Continuous <Continuous>    MEDICATIONS  (PRN):  acetaminophen     Tablet .. 650 milliGRAM(s) Oral every 6 hours PRN Temp greater or equal to 38C (100.4F)  ibuprofen  Tablet. 400 milliGRAM(s) Oral every 8 hours PRN Temp greater or equal to 38C (100.4F)  melatonin 3 milliGRAM(s) Oral at bedtime PRN Insomnia  ondansetron Injectable 4 milliGRAM(s) IV Push every 8 hours PRN Nausea and/or Vomiting      CAPILLARY BLOOD GLUCOSE        I&O's Summary      PHYSICAL EXAM:  Vital Signs Last 24 Hrs  T(C): 35.9 (23 May 2023 09:30), Max: 38.7 (22 May 2023 22:05)  T(F): 96.7 (23 May 2023 09:30), Max: 101.6 (22 May 2023 22:05)  HR: 108 (22 May 2023 22:05) (108 - 113)  BP: 106/54 (22 May 2023 22:05) (106/54 - 110/61)  BP(mean): --  RR: 18 (22 May 2023 22:05) (18 - 18)  SpO2: 95% (22 May 2023 22:05) (95% - 98%)    Parameters below as of 22 May 2023 22:05  Patient On (Oxygen Delivery Method): room air      CONSTITUTIONAL: NAD  RESPIRATORY: Normal respiratory effort; lungs are clear to auscultation bilaterally  CARDIOVASCULAR: Regular rate and rhythm, normal S1 and S2, no murmur/rub/gallop; No lower extremity edema; Peripheral pulses are 2+ bilaterally  ABDOMEN: Nontender to palpation, normoactive bowel sounds, no rebound/guarding; No hepatosplenomegaly  MUSCLOSKELETAL: no clubbing or cyanosis of digits; no joint swelling or tenderness to palpation  PSYCH: A+O to person, place, and time; affect appropriate    LABS:                        12.5   2.10  )-----------( 101      ( 23 May 2023 07:59 )             36.9     05-23    135  |  99  |  8   ----------------------------<  89  2.9<LL>   |  26  |  0.64    Ca    7.7<L>      23 May 2023 07:59  Phos  2.9     05-23  Mg     2.00     05-23    TPro  6.9  /  Alb  3.9  /  TBili  0.6  /  DBili  x   /  AST  370<H>  /  ALT  429<H>  /  AlkPhos  88  05-22    PT/INR - ( 22 May 2023 07:00 )   PT: 12.3 sec;   INR: 1.06 ratio                   Culture - Blood (collected 22 May 2023 07:15)  Source: .Blood Blood-Venous  Preliminary Report (23 May 2023 11:01):    No growth to date.    Culture - Blood (collected 22 May 2023 07:00)  Source: .Blood Blood-Peripheral  Preliminary Report (23 May 2023 11:01):    No growth to date.        RADIOLOGY & ADDITIONAL TESTS:  Results Reviewed:   Imaging Personally Reviewed:  Electrocardiogram Personally Reviewed:    COORDINATION OF CARE:  Care Discussed with Consultants/Other Providers [Y/N]:  Prior or Outpatient Records Reviewed [Y/N]:

## 2023-05-23 NOTE — PROGRESS NOTE ADULT - PROBLEM SELECTOR PLAN 4
Likely hypovolemic hyponatremia   -Monitor sodium daily   -TSH: WNL  -Continue IVF, encourage PO intake

## 2023-05-23 NOTE — PROVIDER CONTACT NOTE (CRITICAL VALUE NOTIFICATION) - ACTION/TREATMENT ORDERED:
Potassium 40mEq q 4 for three doses ordered.
Provider aware. To order supplementation.
Provider made aware, awaiting orders
as per ACP, awaiting medication orders for electrolyte repletion

## 2023-05-23 NOTE — PROGRESS NOTE ADULT - PROBLEM SELECTOR PLAN 1
Sepsis present on admission, unclear etiology   Remains febrile, w/ bandemia and leukopenia   Initially thought to be d/t gastroenteritis but diarrhea resolving and still remains febrile   -blood and urine cultures NEG to date  -repeat blood cultures NGTD   -GI PCR neg  -CXR w/ clear lungs   -RVP neg   -CT A/P unremarkable   -Empiric Ceftriaxone   -ID consult

## 2023-05-23 NOTE — PROVIDER CONTACT NOTE (CRITICAL VALUE NOTIFICATION) - BACKGROUND
57 yo Female  admitted for fever due to unspecified condition, hypocalemia, hyponatremia.  PmHx HTN, MS. Pt septic on IV antibiotics.
patient admitted for septic workup, r/o cdiff
Fever, MS, HTN
admitted with weakness, fever, chills, vomiting, diarrhea.

## 2023-05-23 NOTE — PROVIDER CONTACT NOTE (CRITICAL VALUE NOTIFICATION) - PERSON GIVING RESULT:
[facility]
Chemistry. [facility]
Jason Pederson
EMILIANO fletcher

## 2023-05-23 NOTE — PROGRESS NOTE ADULT - ASSESSMENT
58 year old with MS treated with Ocrelizumab presented with vomitting and fever.  States she has felt weak with gait instability.     1) transaminitis  Pt is EBNA positive- EBV unlikely a casue of acute presentation  CMV pcr is negative  Hep A , Hep B , hep C are nonreactive  hep E serology pending  Hepatology follow up     2) Cytopenia  Elevated ferritin  Monitor CBC with differential  consider heme eval    3) Elevated procalcitonin  at the time pt had SUSAN  Can repeat but it is a nonspecific marker and not clear it will help with a diagnosis  Urine and blood culture are without significant growth    4) pyuria  UA on 5/19 with 53 WBC; but denies dysuria  US with mild right hydroureter.  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

## 2023-05-23 NOTE — PROGRESS NOTE ADULT - SUBJECTIVE AND OBJECTIVE BOX
Follow Up:      Inverval History/ROS: Patient is a 58y old  Female who presents with a chief complaint of Fever, nausea, vomiting (23 May 2023 13:21)    +fever    Allergies    No Known Allergies    Intolerances        ANTIMICROBIALS:  cefTRIAXone   IVPB 1000 every 24 hours      OTHER MEDS:  acetaminophen     Tablet .. 650 milliGRAM(s) Oral every 6 hours PRN  amitriptyline 50 milliGRAM(s) Oral daily  heparin   Injectable 5000 Unit(s) SubCutaneous every 8 hours  ibuprofen  Tablet. 400 milliGRAM(s) Oral every 8 hours PRN  melatonin 3 milliGRAM(s) Oral at bedtime PRN  ondansetron Injectable 4 milliGRAM(s) IV Push every 8 hours PRN  potassium chloride    Tablet ER 40 milliEquivalent(s) Oral every 4 hours  potassium chloride    Tablet ER 40 milliEquivalent(s) Oral every 4 hours  pregabalin 75 milliGRAM(s) Oral daily  sodium chloride 0.9%. 1000 milliLiter(s) IV Continuous <Continuous>      Vital Signs Last 24 Hrs  T(C): 35.9 (23 May 2023 09:30), Max: 38.7 (22 May 2023 22:05)  T(F): 96.7 (23 May 2023 09:30), Max: 101.6 (22 May 2023 22:05)  HR: 108 (22 May 2023 22:05) (108 - 108)  BP: 106/54 (22 May 2023 22:05) (106/54 - 106/54)  BP(mean): --  RR: 18 (22 May 2023 22:05) (18 - 18)  SpO2: 95% (22 May 2023 22:05) (95% - 95%)    Parameters below as of 22 May 2023 22:05  Patient On (Oxygen Delivery Method): room air        PHYSICAL EXAM:  General: [x ] non-toxic  HEAD/EYES: [ ] PERRL [x ] white sclera [ ] icterus  ENT:  [ ] normal [x ] supple [ ] thrush [ ] pharyngeal exudate  Cardiovascular:   [ ] murmur [x ] normal [ ] PPM/AICD  Respiratory:  [ x] clear to ausculation bilaterally  GI:  [x ] soft, non-tender, normal bowel sounds  :  [ ] chadwick [ ] no CVA tenderness   Musculoskeletal:  [x ] no synovitis  Neurologic:  [ x] non-focal exam   Skin:  [x ] no rash  Lymph:x [ x] no lymphadenopathy  Psychiatric:  [ x] appropriate affect [ ] alert & oriented  Lines:  [x ] no phlebitis [ ] central line                                12.5   2.10  )-----------( 101      ( 23 May 2023 07:59 )             36.9       05-23    135  |  99  |  8   ----------------------------<  89  2.9<LL>   |  26  |  0.64    Ca    7.7<L>      23 May 2023 07:59  Phos  2.9     05-23  Mg     2.00     05-23    TPro  6.9  /  Alb  3.9  /  TBili  0.6  /  DBili  x   /  AST  370<H>  /  ALT  429<H>  /  AlkPhos  88  05-22          MICROBIOLOGY:Culture Results:   No growth to date. (05-22-23 @ 07:15)  Culture Results:   No growth to date. (05-22-23 @ 07:00)  Culture Results:   No enteric pathogens isolated.  (Stool culture examined for Salmonella,  Shigella, Campylobacter, Aeromonas, Plesiomonas,  Vibrio, E.coli O157 and Yersinia) (05-20-23 @ 12:07)  Culture Results:   No growth to date. (05-19-23 @ 22:35)  Culture Results:   No growth to date. (05-19-23 @ 22:30)  Culture Results:   No growth (05-19-23 @ 22:05)      RADIOLOGY:

## 2023-05-23 NOTE — PROGRESS NOTE ADULT - SUBJECTIVE AND OBJECTIVE BOX
Gastroenterology/Hepatology Progress Note      Interval Events:     - Patient had fever overnight.   - Reported she is feeling well since admission.   - Has mild chills, abd pain improved but no nausea or vomiting.   - Tolerating po diet well.     Allergies:  No Known Allergies      Hospital Medications:  acetaminophen     Tablet .. 650 milliGRAM(s) Oral every 6 hours PRN  amitriptyline 50 milliGRAM(s) Oral daily  cefTRIAXone   IVPB 1000 milliGRAM(s) IV Intermittent every 24 hours  heparin   Injectable 5000 Unit(s) SubCutaneous every 8 hours  ibuprofen  Tablet. 400 milliGRAM(s) Oral every 8 hours PRN  melatonin 3 milliGRAM(s) Oral at bedtime PRN  ondansetron Injectable 4 milliGRAM(s) IV Push every 8 hours PRN  potassium chloride    Tablet ER 40 milliEquivalent(s) Oral every 4 hours  potassium chloride    Tablet ER 40 milliEquivalent(s) Oral every 4 hours  pregabalin 75 milliGRAM(s) Oral daily  sodium chloride 0.9%. 1000 milliLiter(s) IV Continuous <Continuous>      ROS: 14 point ROS negative unless otherwise state in subjective    PHYSICAL EXAM:   Vital Signs:  Vital Signs Last 24 Hrs  T(C): 35.9 (23 May 2023 09:30), Max: 38.7 (22 May 2023 22:05)  T(F): 96.7 (23 May 2023 09:30), Max: 101.6 (22 May 2023 22:05)  HR: 108 (22 May 2023 22:05) (105 - 113)  BP: 106/54 (22 May 2023 22:05) (106/54 - 110/61)  BP(mean): --  RR: 18 (22 May 2023 22:05) (18 - 18)  SpO2: 95% (22 May 2023 22:05) (95% - 98%)    Parameters below as of 22 May 2023 22:05  Patient On (Oxygen Delivery Method): room air      Daily     Daily     PHYSICAL EXAM:     GENERAL:  NAD, lying in bed  HEENT:  NCAT, no scleral icterus, on nasal cannula  CHEST:  no respiratory distress  ABDOMEN:  Soft, non-tender, non-distended, no masses  EXTREMITIES: No LE edema  NEURO:  Alert and oriented x 3, no tremors.     LABS:                        12.5   2.10  )-----------( 101      ( 23 May 2023 07:59 )             36.9     Mean Cell Volume: 95.6 fL (05-23-23 @ 07:59)    05-23    135  |  99  |  8   ----------------------------<  89  2.9<LL>   |  26  |  0.64    Ca    7.7<L>      23 May 2023 07:59  Phos  2.9     05-23  Mg     2.00     05-23    TPro  6.9  /  Alb  3.9  /  TBili  0.6  /  DBili  x   /  AST  370<H>  /  ALT  429<H>  /  AlkPhos  88  05-22    LIVER FUNCTIONS - ( 22 May 2023 07:00 )  Alb: 3.9 g/dL / Pro: 6.9 g/dL / ALK PHOS: 88 U/L / ALT: 429 U/L / AST: 370 U/L / GGT: x           PT/INR - ( 22 May 2023 07:00 )   PT: 12.3 sec;   INR: 1.06 ratio                   Imaging:      Abd US     FINDINGS:  Liver: Echogenic liver parenchyma compatible with hepatic steatosis.  The   main portal vein is patent with hepatopedal flow.  Bile ducts: Normal caliber. Common bile duct measures 3 mm.  Gallbladder: Within normal limits.  Pancreas: Poorly visualized due to bowel gas  Spleen: 11.3 cm. Within normal limits.  Right kidney: 12.2 cm. Mild hydronephrosis.  Left kidney: 12.0 cm. No hydronephrosis.  Ascites: None.  Aorta and IVC: Visualized portions are within normal limits.  Bladder: Partially distended.  A left ureteral jet is visualized.  The   right ureteral jet is not visualized.    IMPRESSION:  Hepatic steatosis.    No sonographic evidence of cholelithiasis, acute cholecystitis or dilated   bile ducts.    Mild right hydronephrosis.  The right ureteral jet is not visualized.  A   right ureteral calculus or other cause for right ureteral obstruction is   not excluded. Underlying genitourinary infection and/or pyelonephritis   should be excluded based on clinical symptoms and laboratory values.    CT A/P on 5/22    FINDINGS:  LOWER CHEST: Left breast implant. Elevated right hemidiaphragm. Right   basilar atelectasis.    LIVER: Within normal limits.  BILE DUCTS: Normal caliber.  GALLBLADDER: Within normal limits.  SPLEEN: Within normal limits.  PANCREAS: Within normal limits.  ADRENALS: Within normal limits.  KIDNEYS/URETERS: Subcentimeter left renal hypodensity.    BLADDER: Within normal limits.  REPRODUCTIVE ORGANS: Uterus and adnexa within normal limits.    BOWEL: No bowel obstruction. Appendix normal. Multiple high attenuation   foci in the colon, likely ingested medications.  PERITONEUM: No ascites.  VESSELS: Within normal limits.  RETROPERITONEUM/LYMPH NODES: No lymphadenopathy.  ABDOMINAL WALL: Within normal limits.  BONES: Degenerative change.    IMPRESSION:  No acute intra-abdominal process

## 2023-05-24 DIAGNOSIS — R79.89 OTHER SPECIFIED ABNORMAL FINDINGS OF BLOOD CHEMISTRY: ICD-10-CM

## 2023-05-24 LAB
AFP-TM SERPL-MCNC: 9.4 NG/ML — HIGH
ALBUMIN SERPL ELPH-MCNC: 3 G/DL — LOW (ref 3.3–5)
ALP SERPL-CCNC: 74 U/L — SIGNIFICANT CHANGE UP (ref 40–120)
ALT FLD-CCNC: 247 U/L — HIGH (ref 4–33)
ANA TITR SER: NEGATIVE — SIGNIFICANT CHANGE UP
ANION GAP SERPL CALC-SCNC: 11 MMOL/L — SIGNIFICANT CHANGE UP (ref 7–14)
AST SERPL-CCNC: 234 U/L — HIGH (ref 4–32)
BASE EXCESS BLDV CALC-SCNC: -1 MMOL/L — SIGNIFICANT CHANGE UP (ref -2–3)
BASOPHILS # BLD AUTO: 0.02 K/UL — SIGNIFICANT CHANGE UP (ref 0–0.2)
BASOPHILS NFR BLD AUTO: 0.9 % — SIGNIFICANT CHANGE UP (ref 0–2)
BILIRUB SERPL-MCNC: 0.4 MG/DL — SIGNIFICANT CHANGE UP (ref 0.2–1.2)
BLOOD GAS VENOUS COMPREHENSIVE RESULT: SIGNIFICANT CHANGE UP
BUN SERPL-MCNC: 5 MG/DL — LOW (ref 7–23)
CALCIUM SERPL-MCNC: 8 MG/DL — LOW (ref 8.4–10.5)
CERULOPLASMIN SERPL-MCNC: 21 MG/DL — SIGNIFICANT CHANGE UP (ref 16–45)
CHLORIDE BLDV-SCNC: 104 MMOL/L — SIGNIFICANT CHANGE UP (ref 96–108)
CHLORIDE SERPL-SCNC: 104 MMOL/L — SIGNIFICANT CHANGE UP (ref 98–107)
CO2 BLDV-SCNC: 26.2 MMOL/L — HIGH (ref 22–26)
CO2 SERPL-SCNC: 21 MMOL/L — LOW (ref 22–31)
CREAT SERPL-MCNC: 0.53 MG/DL — SIGNIFICANT CHANGE UP (ref 0.5–1.3)
EBV DNA SERPL NAA+PROBE-ACNC: SIGNIFICANT CHANGE UP IU/ML
EBV DNA SERPL NAA+PROBE-ACNC: SIGNIFICANT CHANGE UP IU/ML
EBVPCR LOG: SIGNIFICANT CHANGE UP LOG10IU/ML
EBVPCR LOG: SIGNIFICANT CHANGE UP LOG10IU/ML
EGFR: 107 ML/MIN/1.73M2 — SIGNIFICANT CHANGE UP
EOSINOPHIL # BLD AUTO: 0.17 K/UL — SIGNIFICANT CHANGE UP (ref 0–0.5)
EOSINOPHIL NFR BLD AUTO: 7.4 % — HIGH (ref 0–6)
GAS PNL BLDV: 133 MMOL/L — LOW (ref 136–145)
GLUCOSE BLDV-MCNC: 77 MG/DL — SIGNIFICANT CHANGE UP (ref 70–99)
GLUCOSE SERPL-MCNC: 87 MG/DL — SIGNIFICANT CHANGE UP (ref 70–99)
HCO3 BLDV-SCNC: 25 MMOL/L — SIGNIFICANT CHANGE UP (ref 22–29)
HCT VFR BLD CALC: 36.1 % — SIGNIFICANT CHANGE UP (ref 34.5–45)
HCT VFR BLDA CALC: 36 % — SIGNIFICANT CHANGE UP (ref 34.5–46.5)
HEV AB FLD QL: NEGATIVE — SIGNIFICANT CHANGE UP
HGB BLD CALC-MCNC: 12 G/DL — SIGNIFICANT CHANGE UP (ref 11.7–16.1)
HGB BLD-MCNC: 11.9 G/DL — SIGNIFICANT CHANGE UP (ref 11.5–15.5)
IANC: 1.14 K/UL — LOW (ref 1.8–7.4)
IMM GRANULOCYTES NFR BLD AUTO: 0.4 % — SIGNIFICANT CHANGE UP (ref 0–0.9)
LACTATE BLDV-MCNC: 2.3 MMOL/L — HIGH (ref 0.5–2)
LYMPHOCYTES # BLD AUTO: 0.64 K/UL — LOW (ref 1–3.3)
LYMPHOCYTES # BLD AUTO: 27.8 % — SIGNIFICANT CHANGE UP (ref 13–44)
MAGNESIUM SERPL-MCNC: 1.9 MG/DL — SIGNIFICANT CHANGE UP (ref 1.6–2.6)
MCHC RBC-ENTMCNC: 31.8 PG — SIGNIFICANT CHANGE UP (ref 27–34)
MCHC RBC-ENTMCNC: 33 GM/DL — SIGNIFICANT CHANGE UP (ref 32–36)
MCV RBC AUTO: 96.5 FL — SIGNIFICANT CHANGE UP (ref 80–100)
MONOCYTES # BLD AUTO: 0.32 K/UL — SIGNIFICANT CHANGE UP (ref 0–0.9)
MONOCYTES NFR BLD AUTO: 13.9 % — SIGNIFICANT CHANGE UP (ref 2–14)
NEUTROPHILS # BLD AUTO: 1.14 K/UL — LOW (ref 1.8–7.4)
NEUTROPHILS NFR BLD AUTO: 49.6 % — SIGNIFICANT CHANGE UP (ref 43–77)
NRBC # BLD: 0 /100 WBCS — SIGNIFICANT CHANGE UP (ref 0–0)
NRBC # FLD: 0 K/UL — SIGNIFICANT CHANGE UP (ref 0–0)
PCO2 BLDV: 45 MMHG — SIGNIFICANT CHANGE UP (ref 39–52)
PH BLDV: 7.35 — SIGNIFICANT CHANGE UP (ref 7.32–7.43)
PHOSPHATE SERPL-MCNC: 2.3 MG/DL — LOW (ref 2.5–4.5)
PLATELET # BLD AUTO: 118 K/UL — LOW (ref 150–400)
PO2 BLDV: 43 MMHG — SIGNIFICANT CHANGE UP (ref 25–45)
POTASSIUM BLDV-SCNC: 3.3 MMOL/L — LOW (ref 3.5–5.1)
POTASSIUM SERPL-MCNC: 3.6 MMOL/L — SIGNIFICANT CHANGE UP (ref 3.5–5.3)
POTASSIUM SERPL-SCNC: 3.6 MMOL/L — SIGNIFICANT CHANGE UP (ref 3.5–5.3)
PROT SERPL-MCNC: 5.3 G/DL — LOW (ref 6–8.3)
RBC # BLD: 3.74 M/UL — LOW (ref 3.8–5.2)
RBC # FLD: 13.5 % — SIGNIFICANT CHANGE UP (ref 10.3–14.5)
SAO2 % BLDV: 70.7 % — SIGNIFICANT CHANGE UP (ref 67–88)
SMOOTH MUSCLE AB SER-ACNC: ABNORMAL
SODIUM SERPL-SCNC: 136 MMOL/L — SIGNIFICANT CHANGE UP (ref 135–145)
WBC # BLD: 2.3 K/UL — LOW (ref 3.8–10.5)
WBC # FLD AUTO: 2.3 K/UL — LOW (ref 3.8–10.5)

## 2023-05-24 PROCEDURE — 99232 SBSQ HOSP IP/OBS MODERATE 35: CPT

## 2023-05-24 PROCEDURE — 99223 1ST HOSP IP/OBS HIGH 75: CPT

## 2023-05-24 RX ORDER — LIDOCAINE HCL 20 MG/ML
20 VIAL (ML) INJECTION ONCE
Refills: 0 | Status: DISCONTINUED | OUTPATIENT
Start: 2023-05-24 | End: 2023-06-08

## 2023-05-24 RX ORDER — SODIUM,POTASSIUM PHOSPHATES 278-250MG
1 POWDER IN PACKET (EA) ORAL ONCE
Refills: 0 | Status: COMPLETED | OUTPATIENT
Start: 2023-05-24 | End: 2023-05-24

## 2023-05-24 RX ADMIN — CEFTRIAXONE 100 MILLIGRAM(S): 500 INJECTION, POWDER, FOR SOLUTION INTRAMUSCULAR; INTRAVENOUS at 17:17

## 2023-05-24 RX ADMIN — Medication 1 PACKET(S): at 12:48

## 2023-05-24 RX ADMIN — Medication 75 MILLIGRAM(S): at 12:48

## 2023-05-24 RX ADMIN — Medication 50 MILLIGRAM(S): at 12:48

## 2023-05-24 RX ADMIN — HEPARIN SODIUM 5000 UNIT(S): 5000 INJECTION INTRAVENOUS; SUBCUTANEOUS at 22:19

## 2023-05-24 RX ADMIN — Medication 650 MILLIGRAM(S): at 22:47

## 2023-05-24 NOTE — PROGRESS NOTE ADULT - PROBLEM SELECTOR PLAN 2
Elevated Ferritin w/ bicytopenia, elevated LFT's, and persistent fevers. Ruling out HLH   -Heme consulted, will review peripheral smear   -obtain triglyceride levels, Fibrinogen, soluble IL-2  -Possible bone marrow biopsy

## 2023-05-24 NOTE — CONSULT NOTE ADULT - ATTENDING COMMENTS
57 yo Female with PMH of HTN, MS (on Ocrevus every 6 months, last 2/2023) c/o 3 days of generalized weakness, fever and chills with associated vomiting. She developed fevers and chills one months ago which resolved after several weeks and then recurred several days ago at which point she sought medical attention. The fevers have been as high as 103. On evaluation she has bicytopenia with wbc 2.3 and plts 118 and normal hgb. She has had known elevated liver enzymes in "200s" since February, which were investigated by her gastroenterologist (unclear etiology). She now has AST//247, normal bilirubin, , ferritin [id number] and anti SMA +1:20. CT A/P: No acute intra-abdominal process  CMV PCR neg Hepatitis panel neg COVID neg. Given fever, bicytopenia and markedly elevated ferritin will pursue evaluation for possible HLH.  Peripheral smear with RBC normal in size and number, decreased WBC with normal morphology, PLTs appears normal. Recommend checking triglyceride levels, Fibrinogen, soluble IL-2, immunoglobulin levels and would trend LFTS, coags, CBC. Please order CT chest. Will plan to do BM biopsy on 5/25.

## 2023-05-24 NOTE — PROGRESS NOTE ADULT - ASSESSMENT
58 year old with MS treated with Ocrelizumab presented with vomitting and fever.  States she has felt weak with gait instability.     1) transaminitis  Pt is EBNA positive- EBV unlikely a cause of acute presentation. EBV pcr is negative  CMV pcr is negative  Hep A , Hep B , hep C are nonreactive  hep E serology pending  Hepatology follow up     2) Cytopenia  Elevated ferritin  Monitor CBC with differential  heme eval in progress    3) Elevated procalcitonin  at the time pt had SUSAN  Can repeat but it is a nonspecific marker and not clear it will help with a diagnosis  Urine and blood culture are without significant growth    4) pyuria  UA on 5/19 with 53 WBC; but denies dysuria  US with mild right hydroureter.  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

## 2023-05-24 NOTE — PROGRESS NOTE ADULT - PROBLEM SELECTOR PLAN 1
Sepsis/SIRS present on admission, unclear etiology   Remains febrile, w/ bandemia and leukopenia   Initially thought to be d/t gastroenteritis but diarrhea resolving and still remains febrile   -blood and urine cultures NEG to date  -repeat blood cultures NGTD   -GI PCR neg  -CXR w/ clear lungs   -RVP neg   -CT A/P unremarkable   -Empiric Ceftriaxone   -ID consult

## 2023-05-24 NOTE — PROGRESS NOTE ADULT - SUBJECTIVE AND OBJECTIVE BOX
PROGRESS NOTE:     Patient is a 58y old  Female who presents with a chief complaint of Fever, nausea, vomiting (24 May 2023 12:58)      SUBJECTIVE / OVERNIGHT EVENTS: Remains febrile. has loose BM's but no diarrhea.     ADDITIONAL REVIEW OF SYSTEMS:    MEDICATIONS  (STANDING):  amitriptyline 50 milliGRAM(s) Oral daily  cefTRIAXone   IVPB 1000 milliGRAM(s) IV Intermittent every 24 hours  heparin   Injectable 5000 Unit(s) SubCutaneous every 8 hours  heparin  Lock Flush 100 Units/mL Injectable 100 Unit(s) IV Push once  lidocaine 2% Injectable 20 milliLiter(s) Local Injection once  potassium chloride    Tablet ER 40 milliEquivalent(s) Oral every 4 hours  pregabalin 75 milliGRAM(s) Oral daily  sodium chloride 0.9%. 1000 milliLiter(s) (50 mL/Hr) IV Continuous <Continuous>    MEDICATIONS  (PRN):  acetaminophen     Tablet .. 650 milliGRAM(s) Oral every 6 hours PRN Temp greater or equal to 38C (100.4F)  ibuprofen  Tablet. 400 milliGRAM(s) Oral every 8 hours PRN Temp greater or equal to 38C (100.4F)  melatonin 3 milliGRAM(s) Oral at bedtime PRN Insomnia  ondansetron Injectable 4 milliGRAM(s) IV Push every 8 hours PRN Nausea and/or Vomiting      CAPILLARY BLOOD GLUCOSE        I&O's Summary      PHYSICAL EXAM:  Vital Signs Last 24 Hrs  T(C): 37.3 (24 May 2023 13:00), Max: 38.7 (23 May 2023 22:00)  T(F): 99.1 (24 May 2023 13:00), Max: 101.6 (23 May 2023 22:00)  HR: 99 (23 May 2023 22:00) (98 - 99)  BP: 108/50 (24 May 2023 13:00) (101/58 - 108/60)  BP(mean): --  RR: 18 (23 May 2023 22:00) (18 - 18)  SpO2: 96% (23 May 2023 22:00) (95% - 96%)    Parameters below as of 23 May 2023 22:00  Patient On (Oxygen Delivery Method): room air  O2 Flow (L/min): 2      CONSTITUTIONAL: NAD, well-developed  RESPIRATORY: Normal respiratory effort; lungs are clear to auscultation bilaterally  CARDIOVASCULAR: Regular rate and rhythm, normal S1 and S2, no murmur/rub/gallop; No lower extremity edema; Peripheral pulses are 2+ bilaterally  ABDOMEN: Nontender to palpation, normoactive bowel sounds, no rebound/guarding; No hepatosplenomegaly  MUSCLOSKELETAL: no clubbing or cyanosis of digits; no joint swelling or tenderness to palpation  PSYCH: A+O to person, place, and time; affect appropriate    LABS:                        11.9   2.30  )-----------( 118      ( 24 May 2023 06:10 )             36.1     05-24    136  |  104  |  5<L>  ----------------------------<  87  3.6   |  21<L>  |  0.53    Ca    8.0<L>      24 May 2023 06:10  Phos  2.3     05-24  Mg     1.90     05-24    TPro  5.3<L>  /  Alb  3.0<L>  /  TBili  0.4  /  DBili  x   /  AST  234<H>  /  ALT  247<H>  /  AlkPhos  74  05-24              Culture - Blood (collected 22 May 2023 07:15)  Source: .Blood Blood-Venous  Preliminary Report (23 May 2023 11:01):    No growth to date.    Culture - Blood (collected 22 May 2023 07:00)  Source: .Blood Blood-Peripheral  Preliminary Report (23 May 2023 11:01):    No growth to date.        RADIOLOGY & ADDITIONAL TESTS:  Results Reviewed:   Imaging Personally Reviewed:  Electrocardiogram Personally Reviewed:    COORDINATION OF CARE:  Care Discussed with Consultants/Other Providers [Y/N]:  Prior or Outpatient Records Reviewed [Y/N]:

## 2023-05-24 NOTE — CONSULT NOTE ADULT - SUBJECTIVE AND OBJECTIVE BOX
Hematology Oncology Consult Note    HPI:  59yo Female with MHx of HTN, MS c/o 3 days of generalized weakness, fever and chills with associated nbnb vomiting. Also reports 2 episodes of nb diarrhea today and yesterday with associated moerate Abd discomfort in the epigastric region. States has been taking Tylenol for fever and gen body pain, specifically:  1 day ago took 1g, 2 days ago took 4g, and 3 days ago took 1g. Otherwise reports no cp/sob, abd pain, urinary symptoms, recent travel, cough, sore throat, rhinorrhea, leg swelling, recent travel. Pt gets Ocrevus infusion every 6 months; Last infusion was in .   Of note, the pt reports elevated liver enzymes in "200s" since February, which were investigated by her gastroenterologist (unclear etiology). Possibly attributed to Ocrevus infusion vs. genetic condition Mother  of liver cirrhosis, non alcoholic, unknown etiology. US abd showed hepatic steatosis. CT A/P reported liver wnl.     (20 May 2023 03:46)    Lab with thrombocytopenia ~118K and leukopenia 2.3 normal Hb   AST//247  Bilirubin wnl  Ferritin [id number]     anti SMA +1:20   EBV serology and EBNA positive     Hematology is consulted for the concern for HLH       PAST MEDICAL & SURGICAL HISTORY:  MS (multiple sclerosis)      HTN (hypertension)      No significant past surgical history          FAMILY HISTORY:  FH: cirrhosis (Mother)    FH: esophageal cancer (Father)        MEDICATIONS  (STANDING):  amitriptyline 50 milliGRAM(s) Oral daily  cefTRIAXone   IVPB 1000 milliGRAM(s) IV Intermittent every 24 hours  heparin   Injectable 5000 Unit(s) SubCutaneous every 8 hours  potassium chloride    Tablet ER 40 milliEquivalent(s) Oral every 4 hours  pregabalin 75 milliGRAM(s) Oral daily  sodium chloride 0.9%. 1000 milliLiter(s) (50 mL/Hr) IV Continuous <Continuous>    MEDICATIONS  (PRN):  acetaminophen     Tablet .. 650 milliGRAM(s) Oral every 6 hours PRN Temp greater or equal to 38C (100.4F)  ibuprofen  Tablet. 400 milliGRAM(s) Oral every 8 hours PRN Temp greater or equal to 38C (100.4F)  melatonin 3 milliGRAM(s) Oral at bedtime PRN Insomnia  ondansetron Injectable 4 milliGRAM(s) IV Push every 8 hours PRN Nausea and/or Vomiting      Allergies    No Known Allergies    Intolerances        SOCIAL HISTORY: No EtOH, no tobacco      T(F): 99.1 (23 @ 06:27), Max: 101.6 (23 @ 22:00)  HR: 99 (23 @ 22:00)  BP: 101/58 (23 @ 22:00)  RR: 18 (23 @ 22:00)  SpO2: 96% (23 @ 22:00)  Wt(kg): --    PHYSICAL EXAM:    Constitutional: NAD  Respiratory: CTA b/l, symmetric chest rise, with normal respiratory effort  Cardiovascular: RRR  Gastrointestinal: soft, NTND  Extremities:  no edema  MSK: no obvious abnormalities  Neurological: Grossly intact  Skin: no rash, no echymoses, no petichiae  Psych: normal affect                          11.9   2.30  )-----------( 118      ( 24 May 2023 06:10 )             36.1           136  |  104  |  5<L>  ----------------------------<  87  3.6   |  21<L>  |  0.53    Ca    8.0<L>      24 May 2023 06:10  Phos  2.3       Mg     1.90         TPro  5.3<L>  /  Alb  3.0<L>  /  TBili  0.4  /  DBili  x   /  AST  234<H>  /  ALT  247<H>  /  AlkPhos  74        Magnesium, Serum: 1.90 mg/dL ( @ 06:10)  Phosphorus Level, Serum: 2.3 mg/dL ( @ 06:10)       Hematology Oncology Consult Note    HPI:  57yo Female with MHx of HTN, MS c/o 3 days of generalized weakness, fever and chills with associated nbnb vomiting. Also reports 2 episodes of nb diarrhea today and yesterday with associated moerate Abd discomfort in the epigastric region. States has been taking Tylenol for fever and gen body pain, specifically:  1 day ago took 1g, 2 days ago took 4g, and 3 days ago took 1g. Otherwise reports no cp/sob, abd pain, urinary symptoms, recent travel, cough, sore throat, rhinorrhea, leg swelling, recent travel. Pt gets Ocrevus infusion every 6 months; Last infusion was in .   Of note, the pt reports elevated liver enzymes in "200s" since February, which were investigated by her gastroenterologist (unclear etiology). Possibly attributed to Ocrevus infusion vs. genetic condition Mother  of liver cirrhosis, non alcoholic, unknown etiology. US abd showed hepatic steatosis. CT A/P reported liver wnl.     (20 May 2023 03:46)    Lab with thrombocytopenia ~118K and leukopenia 2.3 normal Hb   AST//247  Bilirubin wnl  Ferritin [id number]     anti SMA +1:20   EBV serology and EBNA positive     Hematology is consulted for the concern for HLH     Patient was seen at bedside, reports MS was diagnosed in 2019. Reports an ongoing fevers and generalize weakness since 1 month ago, mainly intermittent high fevers. Initially she thought she has infections and took ABx with no improvement. She reports stable elevated LFTS since . She saw GI in [place] for it, and was told she just need to be monitored and Bx may not be warranted          PAST MEDICAL & SURGICAL HISTORY:  MS (multiple sclerosis)      HTN (hypertension)      No significant past surgical history          FAMILY HISTORY:  FH: cirrhosis (Mother)    FH: esophageal cancer (Father)        MEDICATIONS  (STANDING):  amitriptyline 50 milliGRAM(s) Oral daily  cefTRIAXone   IVPB 1000 milliGRAM(s) IV Intermittent every 24 hours  heparin   Injectable 5000 Unit(s) SubCutaneous every 8 hours  potassium chloride    Tablet ER 40 milliEquivalent(s) Oral every 4 hours  pregabalin 75 milliGRAM(s) Oral daily  sodium chloride 0.9%. 1000 milliLiter(s) (50 mL/Hr) IV Continuous <Continuous>    MEDICATIONS  (PRN):  acetaminophen     Tablet .. 650 milliGRAM(s) Oral every 6 hours PRN Temp greater or equal to 38C (100.4F)  ibuprofen  Tablet. 400 milliGRAM(s) Oral every 8 hours PRN Temp greater or equal to 38C (100.4F)  melatonin 3 milliGRAM(s) Oral at bedtime PRN Insomnia  ondansetron Injectable 4 milliGRAM(s) IV Push every 8 hours PRN Nausea and/or Vomiting      Allergies    No Known Allergies    Intolerances        SOCIAL HISTORY: No EtOH, no tobacco      T(F): 99.1 (23 @ 06:27), Max: 101.6 (23 @ 22:00)  HR: 99 (23 @ 22:00)  BP: 101/58 (23 @ 22:00)  RR: 18 (23 @ 22:00)  SpO2: 96% (23 @ 22:00)  Wt(kg): --    PHYSICAL EXAM:    Constitutional: NAD  Respiratory: CTA b/l, symmetric chest rise, with normal respiratory effort  Cardiovascular: RRR  Gastrointestinal: soft, NTND  Extremities:  no edema  MSK: no obvious abnormalities  Neurological: Grossly intact  Skin: no rash, no echymoses, no petichiae  Psych: normal affect                          11.9   2.30  )-----------( 118      ( 24 May 2023 06:10 )             36.1           136  |  104  |  5<L>  ----------------------------<  87  3.6   |  21<L>  |  0.53    Ca    8.0<L>      24 May 2023 06:10  Phos  2.3       Mg     1.90         TPro  5.3<L>  /  Alb  3.0<L>  /  TBili  0.4  /  DBili  x   /  AST  234<H>  /  ALT  247<H>  /  AlkPhos  74        Magnesium, Serum: 1.90 mg/dL ( @ 06:10)  Phosphorus Level, Serum: 2.3 mg/dL ( @ 06:10)       Hematology Oncology Consult Note    HPI:  59 yo Female with MHx of HTN, MS c/o 3 days of generalized weakness, fever and chills with associated nbnb vomiting. Also reports 2 episodes of nb diarrhea today and yesterday with associated moerate Abd discomfort in the epigastric region. States has been taking Tylenol for fever and gen body pain, specifically:  1 day ago took 1g, 2 days ago took 4g, and 3 days ago took 1g. Otherwise reports no cp/sob, abd pain, urinary symptoms, recent travel, cough, sore throat, rhinorrhea, leg swelling, recent travel. Pt gets Ocrevus infusion every 6 months; Last infusion was in .   Of note, the pt reports elevated liver enzymes in "200s" since February, which were investigated by her gastroenterologist (unclear etiology). Possibly attributed to Ocrevus infusion vs. genetic condition Mother  of liver cirrhosis, non alcoholic, unknown etiology. US abd showed hepatic steatosis. CT A/P reported liver wnl.     (20 May 2023 03:46)    Lab with thrombocytopenia ~118K and leukopenia 2.3 normal Hb   AST//247  Bilirubin wnl  Ferritin [id number]     anti SMA +1:20   EBV serology and EBNA positive     Hematology is consulted for the concern for HLH     Patient was seen at bedside, reports MS was diagnosed in 2019. Reports an ongoing fevers and generalize weakness since 1 month ago, mainly intermittent high fevers. Initially she thought she has infections and took ABx with no improvement. She reports stable elevated LFTS since . She saw GI in [place] for it, and was told she just need to be monitored and Bx may not be warranted          PAST MEDICAL & SURGICAL HISTORY:  MS (multiple sclerosis)      HTN (hypertension)      No significant past surgical history          FAMILY HISTORY:  FH: cirrhosis (Mother)    FH: esophageal cancer (Father)        MEDICATIONS  (STANDING):  amitriptyline 50 milliGRAM(s) Oral daily  cefTRIAXone   IVPB 1000 milliGRAM(s) IV Intermittent every 24 hours  heparin   Injectable 5000 Unit(s) SubCutaneous every 8 hours  potassium chloride    Tablet ER 40 milliEquivalent(s) Oral every 4 hours  pregabalin 75 milliGRAM(s) Oral daily  sodium chloride 0.9%. 1000 milliLiter(s) (50 mL/Hr) IV Continuous <Continuous>    MEDICATIONS  (PRN):  acetaminophen     Tablet .. 650 milliGRAM(s) Oral every 6 hours PRN Temp greater or equal to 38C (100.4F)  ibuprofen  Tablet. 400 milliGRAM(s) Oral every 8 hours PRN Temp greater or equal to 38C (100.4F)  melatonin 3 milliGRAM(s) Oral at bedtime PRN Insomnia  ondansetron Injectable 4 milliGRAM(s) IV Push every 8 hours PRN Nausea and/or Vomiting      Allergies    No Known Allergies    Intolerances        SOCIAL HISTORY: No EtOH, no tobacco      T(F): 99.1 (23 @ 06:27), Max: 101.6 (23 @ 22:00)  HR: 99 (23 @ 22:00)  BP: 101/58 (23 @ 22:00)  RR: 18 (23 @ 22:00)  SpO2: 96% (23 @ 22:00)  Wt(kg): --    PHYSICAL EXAM:    Constitutional: NAD  Respiratory: CTA b/l, symmetric chest rise, with normal respiratory effort  Cardiovascular: RRR  Gastrointestinal: soft, NTND  Extremities:  no edema  MSK: no obvious abnormalities  Neurological: Grossly intact  Skin: no rash, no echymoses, no petichiae  Psych: normal affect                          11.9   2.30  )-----------( 118      ( 24 May 2023 06:10 )             36.1           136  |  104  |  5<L>  ----------------------------<  87  3.6   |  21<L>  |  0.53    Ca    8.0<L>      24 May 2023 06:10  Phos  2.3       Mg     1.90         TPro  5.3<L>  /  Alb  3.0<L>  /  TBili  0.4  /  DBili  x   /  AST  234<H>  /  ALT  247<H>  /  AlkPhos  74        Magnesium, Serum: 1.90 mg/dL ( @ 06:10)  Phosphorus Level, Serum: 2.3 mg/dL ( @ 06:10)

## 2023-05-24 NOTE — PROGRESS NOTE ADULT - PROBLEM SELECTOR PLAN 4
Worsening chronic transaminitis, x20 upper normal level now;   Reports elevated liver enzymes in "200s" since February, which were investigated by her gastroenterologist   -APAP level neg.  -Abd US showing fatty liver  -EBV Ag was positive, f/up EBV PCR  -f/up alpha-1 anti-trypsin (phenotype), ceruloplasm, AFP  -MAYELA, anti-smooth muscle antibody, immunoglobulins (IgG, IgM, IgA quantitative) for autoimmune etiologies pending  -Appreciate hepatology recs Worsening chronic transaminitis, x20 upper normal level now;   Reports elevated liver enzymes in "200s" since February, which were investigated by her gastroenterologist   -APAP level neg.  -Abd US showing fatty liver  -EBV Ag was positive, f/up EBV PCR  -f/up alpha-1 anti-trypsin (phenotype) 212, AFP 9.4   -MAYELA neg, anti-smooth muscle antibody titer high   -f/up immunoglobulins (IgG, IgM, IgA quantitative)   -Appreciate hepatology recs

## 2023-05-24 NOTE — PROGRESS NOTE ADULT - SUBJECTIVE AND OBJECTIVE BOX
Follow Up:      Inverval History/ROS:Patient is a 58y old  Female who presents with a chief complaint of Fever, nausea, vomiting (24 May 2023 13:34)    +fever  C/o fatigue    Allergies    No Known Allergies    Intolerances        ANTIMICROBIALS:      OTHER MEDS:  acetaminophen     Tablet .. 650 milliGRAM(s) Oral every 6 hours PRN  amitriptyline 50 milliGRAM(s) Oral daily  heparin   Injectable 5000 Unit(s) SubCutaneous every 8 hours  heparin  Lock Flush 100 Units/mL Injectable 100 Unit(s) IV Push once  ibuprofen  Tablet. 400 milliGRAM(s) Oral every 8 hours PRN  lidocaine 2% Injectable 20 milliLiter(s) Local Injection once  melatonin 3 milliGRAM(s) Oral at bedtime PRN  ondansetron Injectable 4 milliGRAM(s) IV Push every 8 hours PRN  potassium chloride    Tablet ER 40 milliEquivalent(s) Oral every 4 hours  pregabalin 75 milliGRAM(s) Oral daily  sodium chloride 0.9%. 1000 milliLiter(s) IV Continuous <Continuous>      Vital Signs Last 24 Hrs  T(C): 37.3 (24 May 2023 13:00), Max: 38.7 (23 May 2023 22:00)  T(F): 99.1 (24 May 2023 13:00), Max: 101.6 (23 May 2023 22:00)  HR: 99 (23 May 2023 22:00) (99 - 99)  BP: 108/50 (24 May 2023 13:00) (101/58 - 108/50)  BP(mean): --  RR: 18 (23 May 2023 22:00) (18 - 18)  SpO2: 96% (23 May 2023 22:00) (96% - 96%)    Parameters below as of 23 May 2023 22:00  Patient On (Oxygen Delivery Method): room air  O2 Flow (L/min): 2      PHYSICAL EXAM:  General: [x ] non-toxic  HEAD/EYES: [ ] PERRL [x ] white sclera [ ] icterus  ENT:  [ ] normal [ ] supple [ ] thrush [ ] pharyngeal exudate  Cardiovascular:   [ ] murmur [x ] normal [ ] PPM/AICD  Respiratory:  [x ] clear to ausculation bilaterally  GI:  [x ] soft, non-tender, normal bowel sounds  :  [ ] chadwick [ ] no CVA tenderness   Musculoskeletal:  [ ] no synovitis  Neurologic:  [ ] non-focal exam   Skin:  [x ] no rash  Lymph: [x ] no lymphadenopathy  Psychiatric:  [ ] appropriate affect [ ] alert & oriented  Lines:  [x ] no phlebitis [ ] central line                                11.9   2.30  )-----------( 118      ( 24 May 2023 06:10 )             36.1       05-24    136  |  104  |  5<L>  ----------------------------<  87  3.6   |  21<L>  |  0.53    Ca    8.0<L>      24 May 2023 06:10  Phos  2.3     05-24  Mg     1.90     05-24    TPro  5.3<L>  /  Alb  3.0<L>  /  TBili  0.4  /  DBili  x   /  AST  234<H>  /  ALT  247<H>  /  AlkPhos  74  05-24          MICROBIOLOGY:Culture Results:   No growth to date. (05-22-23 @ 07:15)  Culture Results:   No growth to date. (05-22-23 @ 07:00)  Culture Results:   No enteric pathogens isolated.  (Stool culture examined for Salmonella,  Shigella, Campylobacter, Aeromonas, Plesiomonas,  Vibrio, E.coli O157 and Yersinia) (05-20-23 @ 12:07)  Culture Results:   No growth to date. (05-19-23 @ 22:35)  Culture Results:   No growth to date. (05-19-23 @ 22:30)  Culture Results:   No growth (05-19-23 @ 22:05)      RADIOLOGY:

## 2023-05-24 NOTE — CONSULT NOTE ADULT - ASSESSMENT
59yo Female with MHx of HTN, MS c/o 3 days of generalized weakness, fever and chills with associated nbnb vomiting. Also reports 2 episodes of nb diarrhea today and yesterday with associated moerate Abd discomfort in the epigastric region. States has been taking Tylenol for fever and gen body pain, specifically:  1 day ago took 1g, 2 days ago took 4g, and 3 days ago took 1g. Otherwise reports no cp/sob, abd pain, urinary symptoms, recent travel, cough, sore throat, rhinorrhea, leg swelling, recent travel. Pt gets Ocrevus infusion every 6 months; Last infusion was in .   Of note, the pt reports elevated liver enzymes in "200s" since February, which were investigated by her gastroenterologist (unclear etiology). Possibly attributed to Ocrevus infusion vs. genetic condition Mother  of liver cirrhosis, non alcoholic, unknown etiology. US abd showed hepatic steatosis. CT A/P reported liver wnl.     (20 May 2023 03:46)    Lab with thrombocytopenia ~118K and leukopenia 2.3 normal Hb   AST//247  Bilirubin wnl  Ferritin 10385     anti SMA +1:20   EBV serology and EBNA positive    Hematology is consulted for the concern for HLH     CT A/P: No acute intra-abdominal process  CMV PCR neg Hepatitis panel neg COVID neg   #Concern for HLH: patient with fevers, elevated ferritin, bicytopenia concern for HLH. The diagnosis of HLH is made based on the five of the following nine findings:  Fever =38.5°C  Splenomegaly  Bicytopenia  Hypertriglyceridemia (fasting triglycerides >265 mg/dL) and/or hypofibrinogenemia (fibrinogen <150 mg/dL)  Hemophagocytosis in bone marrow, spleen, lymph node, or liver  Low or absent NK cell activity  Ferritin >500 ng/mL  Elevated soluble CD25 (soluble IL-2 receptor alpha [Faith-2R]) two standard deviations above age-adjusted laboratory-specific norms  Elevated CXCL9     Currently patient has three of the above findings.     Plan:  - Will review the peripheral smear   -Please obtain triglyceride levels, Fibrinogen, soluble IL-2  -Please obtain  polymerase chain reaction (PCR) testing for Buck-Barr virus (EBV)  -Please trend LFTS  -Trend coags   -Trend cbc   -Please complete CT Chest   -Please obtain immunoglobulin levels   -Will plan for BM Bx-ordered lidocaine and heparin, please have it available.   -Transfuse to maintain Hb>7 and PLT>10K; >20K if febrile, >50K if bleeding         Thank you for the consult, Hematology/oncology will keep following.     Case discussed with Dr. Kendrick Eldridge MD.   PGY-4 hematology/oncology fellow  59yo Female with MHx of HTN, MS c/o 3 days of generalized weakness, fever and chills with associated nbnb vomiting. Also reports 2 episodes of nb diarrhea today and yesterday with associated moerate Abd discomfort in the epigastric region. States has been taking Tylenol for fever and gen body pain, specifically:  1 day ago took 1g, 2 days ago took 4g, and 3 days ago took 1g. Otherwise reports no cp/sob, abd pain, urinary symptoms, recent travel, cough, sore throat, rhinorrhea, leg swelling, recent travel. Pt gets Ocrevus infusion every 6 months; Last infusion was in .   Of note, the pt reports elevated liver enzymes in "200s" since February, which were investigated by her gastroenterologist (unclear etiology). Possibly attributed to Ocrevus infusion vs. genetic condition Mother  of liver cirrhosis, non alcoholic, unknown etiology. US abd showed hepatic steatosis. CT A/P reported liver wnl.     (20 May 2023 03:46)    Lab with thrombocytopenia ~118K and leukopenia 2.3 normal Hb   AST//247  Bilirubin wnl  Ferritin 24486     anti SMA +1:20   EBV serology and EBNA positive    Hematology is consulted for the concern for HLH     CT A/P: No acute intra-abdominal process  CMV PCR neg Hepatitis panel neg COVID neg   #Concern for HLH: patient with fevers, elevated ferritin, bicytopenia concern for HLH. The diagnosis of HLH is made based on the five of the following nine findings:  Fever =38.5°C  Splenomegaly  Bicytopenia  Hypertriglyceridemia (fasting triglycerides >265 mg/dL) and/or hypofibrinogenemia (fibrinogen <150 mg/dL)  Hemophagocytosis in bone marrow, spleen, lymph node, or liver  Low or absent NK cell activity  Ferritin >500 ng/mL  Elevated soluble CD25 (soluble IL-2 receptor alpha [Faith-2R]) two standard deviations above age-adjusted laboratory-specific norms  Elevated CXCL9     Currently patient has three of the above findings.   Peripheral smear with RBC normal in size and number, fewer WBC normal in appearance, PLT appears normal     Plan:  -Please obtain triglyceride levels, Fibrinogen, soluble IL-2  -Please trend LFTS  -Trend coags   -Trend cbc   -Please complete CT Chest   -Please obtain immunoglobulin levels   -Will plan for BM Bx-ordered lidocaine and heparin, please have it available.   -Transfuse to maintain Hb>7 and PLT>10K; >20K if febrile, >50K if bleeding         Thank you for the consult, Hematology/oncology will keep following.     Case discussed with Dr. Kendrick Eldridge MD.   PGY-4 hematology/oncology fellow  57 yo Female with MHx of HTN, MS c/o 3 days of generalized weakness, fever and chills with associated nbnb vomiting. Also reports 2 episodes of nb diarrhea today and yesterday with associated moerate Abd discomfort in the epigastric region. States has been taking Tylenol for fever and gen body pain, specifically:  1 day ago took 1g, 2 days ago took 4g, and 3 days ago took 1g. Otherwise reports no cp/sob, abd pain, urinary symptoms, recent travel, cough, sore throat, rhinorrhea, leg swelling, recent travel. Pt gets Ocrevus infusion every 6 months; Last infusion was in .   Of note, the pt reports elevated liver enzymes in "200s" since February, which were investigated by her gastroenterologist (unclear etiology). Possibly attributed to Ocrevus infusion vs. genetic condition Mother  of liver cirrhosis, non alcoholic, unknown etiology. US abd showed hepatic steatosis. CT A/P reported liver wnl.     (20 May 2023 03:46)    Lab with thrombocytopenia ~118K and leukopenia 2.3 normal Hb   AST//247  Bilirubin wnl  Ferritin 86802     anti SMA +1:20   EBV serology and EBNA positive    Hematology is consulted for the concern for HLH     CT A/P: No acute intra-abdominal process  CMV PCR neg Hepatitis panel neg COVID neg   #Concern for HLH: patient with fevers, elevated ferritin, bicytopenia concern for HLH. The diagnosis of HLH is made based on the five of the following nine findings:  Fever =38.5°C  Splenomegaly  Bicytopenia  Hypertriglyceridemia (fasting triglycerides >265 mg/dL) and/or hypofibrinogenemia (fibrinogen <150 mg/dL)  Hemophagocytosis in bone marrow, spleen, lymph node, or liver  Low or absent NK cell activity  Ferritin >500 ng/mL  Elevated soluble CD25 (soluble IL-2 receptor alpha [Faith-2R]) two standard deviations above age-adjusted laboratory-specific norms  Elevated CXCL9     Currently patient has three of the above findings.   Peripheral smear with RBC normal in size and number, fewer WBC normal in appearance, PLT appears normal     Plan:  -Please obtain triglyceride levels, Fibrinogen, soluble IL-2  -Please trend LFTS  -Trend coags   -Trend cbc   -Please complete CT Chest   -Please obtain immunoglobulin levels   -Will plan for BM Bx-ordered lidocaine and heparin, please have it available.   -Transfuse to maintain Hb>7 and PLT>10K; >20K if febrile, >50K if bleeding         Thank you for the consult, Hematology will continue to follow.     Case discussed with Dr. Kendrick Eldridge MD.   PGY-4 hematology/oncology fellow

## 2023-05-25 LAB
ALBUMIN SERPL ELPH-MCNC: 3.1 G/DL — LOW (ref 3.3–5)
ALP SERPL-CCNC: 82 U/L — SIGNIFICANT CHANGE UP (ref 40–120)
ALT FLD-CCNC: 290 U/L — HIGH (ref 4–33)
ANION GAP SERPL CALC-SCNC: 10 MMOL/L — SIGNIFICANT CHANGE UP (ref 7–14)
AST SERPL-CCNC: 310 U/L — HIGH (ref 4–32)
BASOPHILS # BLD AUTO: 0.05 K/UL — SIGNIFICANT CHANGE UP (ref 0–0.2)
BASOPHILS NFR BLD AUTO: 1.7 % — SIGNIFICANT CHANGE UP (ref 0–2)
BILIRUB SERPL-MCNC: 0.4 MG/DL — SIGNIFICANT CHANGE UP (ref 0.2–1.2)
BLD GP AB SCN SERPL QL: NEGATIVE — SIGNIFICANT CHANGE UP
BUN SERPL-MCNC: 5 MG/DL — LOW (ref 7–23)
CALCIUM SERPL-MCNC: 8 MG/DL — LOW (ref 8.4–10.5)
CHLORIDE SERPL-SCNC: 101 MMOL/L — SIGNIFICANT CHANGE UP (ref 98–107)
CO2 SERPL-SCNC: 23 MMOL/L — SIGNIFICANT CHANGE UP (ref 22–31)
CREAT SERPL-MCNC: 0.51 MG/DL — SIGNIFICANT CHANGE UP (ref 0.5–1.3)
CULTURE RESULTS: SIGNIFICANT CHANGE UP
CULTURE RESULTS: SIGNIFICANT CHANGE UP
EGFR: 108 ML/MIN/1.73M2 — SIGNIFICANT CHANGE UP
EOSINOPHIL # BLD AUTO: 0.12 K/UL — SIGNIFICANT CHANGE UP (ref 0–0.5)
EOSINOPHIL NFR BLD AUTO: 4.4 % — SIGNIFICANT CHANGE UP (ref 0–6)
FERRITIN SERPL-MCNC: 9404 NG/ML — HIGH (ref 15–150)
GIANT PLATELETS BLD QL SMEAR: PRESENT — SIGNIFICANT CHANGE UP
GLUCOSE SERPL-MCNC: 85 MG/DL — SIGNIFICANT CHANGE UP (ref 70–99)
HCT VFR BLD CALC: 34.3 % — LOW (ref 34.5–45)
HGB BLD-MCNC: 11.4 G/DL — LOW (ref 11.5–15.5)
IANC: 1.06 K/UL — LOW (ref 1.8–7.4)
INR BLD: 1.12 RATIO — SIGNIFICANT CHANGE UP (ref 0.88–1.16)
LEGIONELLA AG UR QL: NEGATIVE — SIGNIFICANT CHANGE UP
LYMPHOCYTES # BLD AUTO: 0.51 K/UL — LOW (ref 1–3.3)
LYMPHOCYTES # BLD AUTO: 18.3 % — SIGNIFICANT CHANGE UP (ref 13–44)
MAGNESIUM SERPL-MCNC: 1.9 MG/DL — SIGNIFICANT CHANGE UP (ref 1.6–2.6)
MANUAL SMEAR VERIFICATION: SIGNIFICANT CHANGE UP
MCHC RBC-ENTMCNC: 30.9 PG — SIGNIFICANT CHANGE UP (ref 27–34)
MCHC RBC-ENTMCNC: 33.2 GM/DL — SIGNIFICANT CHANGE UP (ref 32–36)
MCV RBC AUTO: 93 FL — SIGNIFICANT CHANGE UP (ref 80–100)
METAMYELOCYTES # FLD: 1.7 % — HIGH (ref 0–1)
MONOCYTES # BLD AUTO: 0.17 K/UL — SIGNIFICANT CHANGE UP (ref 0–0.9)
MONOCYTES NFR BLD AUTO: 6.1 % — SIGNIFICANT CHANGE UP (ref 2–14)
NEUTROPHILS # BLD AUTO: 1.53 K/UL — LOW (ref 1.8–7.4)
NEUTROPHILS NFR BLD AUTO: 48.7 % — SIGNIFICANT CHANGE UP (ref 43–77)
NEUTS BAND # BLD: 6.1 % — HIGH (ref 0–6)
PHOSPHATE SERPL-MCNC: 3.1 MG/DL — SIGNIFICANT CHANGE UP (ref 2.5–4.5)
PLAT MORPH BLD: ABNORMAL
PLATELET # BLD AUTO: 130 K/UL — LOW (ref 150–400)
PLATELET COUNT - ESTIMATE: ABNORMAL
POLYCHROMASIA BLD QL SMEAR: SLIGHT — SIGNIFICANT CHANGE UP
POTASSIUM SERPL-MCNC: 3.3 MMOL/L — LOW (ref 3.5–5.3)
POTASSIUM SERPL-SCNC: 3.3 MMOL/L — LOW (ref 3.5–5.3)
PROT SERPL-MCNC: 5.4 G/DL — LOW (ref 6–8.3)
PROTHROM AB SERPL-ACNC: 13 SEC — SIGNIFICANT CHANGE UP (ref 10.5–13.4)
RBC # BLD: 3.69 M/UL — LOW (ref 3.8–5.2)
RBC # FLD: 13.3 % — SIGNIFICANT CHANGE UP (ref 10.3–14.5)
RBC BLD AUTO: NORMAL — SIGNIFICANT CHANGE UP
RH IG SCN BLD-IMP: POSITIVE — SIGNIFICANT CHANGE UP
SMUDGE CELLS # BLD: PRESENT — SIGNIFICANT CHANGE UP
SODIUM SERPL-SCNC: 134 MMOL/L — LOW (ref 135–145)
SPECIMEN SOURCE: SIGNIFICANT CHANGE UP
SPECIMEN SOURCE: SIGNIFICANT CHANGE UP
TRIGL SERPL-MCNC: 205 MG/DL — HIGH
VARIANT LYMPHS # BLD: 13 % — HIGH (ref 0–6)
WBC # BLD: 2.8 K/UL — LOW (ref 3.8–10.5)
WBC # FLD AUTO: 2.8 K/UL — LOW (ref 3.8–10.5)

## 2023-05-25 PROCEDURE — 88313 SPECIAL STAINS GROUP 2: CPT | Mod: 26

## 2023-05-25 PROCEDURE — 85097 BONE MARROW INTERPRETATION: CPT

## 2023-05-25 PROCEDURE — 88189 FLOWCYTOMETRY/READ 16 & >: CPT

## 2023-05-25 PROCEDURE — 99232 SBSQ HOSP IP/OBS MODERATE 35: CPT

## 2023-05-25 PROCEDURE — 88342 IMHCHEM/IMCYTCHM 1ST ANTB: CPT | Mod: 26,59

## 2023-05-25 PROCEDURE — 88305 TISSUE EXAM BY PATHOLOGIST: CPT | Mod: 26

## 2023-05-25 PROCEDURE — G0452: CPT | Mod: 26

## 2023-05-25 PROCEDURE — 88341 IMHCHEM/IMCYTCHM EA ADD ANTB: CPT | Mod: 26

## 2023-05-25 PROCEDURE — 88364 INSITU HYBRIDIZATION (FISH): CPT | Mod: 26

## 2023-05-25 PROCEDURE — 88365 INSITU HYBRIDIZATION (FISH): CPT | Mod: 26

## 2023-05-25 RX ORDER — POTASSIUM CHLORIDE 20 MEQ
40 PACKET (EA) ORAL ONCE
Refills: 0 | Status: COMPLETED | OUTPATIENT
Start: 2023-05-25 | End: 2023-05-25

## 2023-05-25 RX ADMIN — Medication 50 MILLIGRAM(S): at 12:19

## 2023-05-25 RX ADMIN — HEPARIN SODIUM 5000 UNIT(S): 5000 INJECTION INTRAVENOUS; SUBCUTANEOUS at 21:34

## 2023-05-25 RX ADMIN — HEPARIN SODIUM 5000 UNIT(S): 5000 INJECTION INTRAVENOUS; SUBCUTANEOUS at 13:50

## 2023-05-25 RX ADMIN — Medication 0.25 MILLIGRAM(S): at 11:30

## 2023-05-25 RX ADMIN — Medication 40 MILLIEQUIVALENT(S): at 09:31

## 2023-05-25 RX ADMIN — Medication 75 MILLIGRAM(S): at 12:20

## 2023-05-25 NOTE — PROGRESS NOTE ADULT - PROBLEM SELECTOR PLAN 2
Elevated Ferritin w/ bicytopenia, elevated LFT's, and persistent fevers. Ruling out HLH   -Ferritin 18316,   -triglyceride levels 205, Fibrinogen wnl   -f/up soluble IL-2  -Plan for bone marrow biopsy today   -Heme input appreciated

## 2023-05-25 NOTE — PROGRESS NOTE ADULT - ATTENDING COMMENTS
57 y/o F PMHx HTN and MS (on ocrelizumab), presented with generalized weakness, fever/chills, and N/V. Persistent fever during admission with labs significant for transaminitis, cytopenia, and markedly elevated ferritin. S/p BM biopsy for HLH workup.     Last fever 101.6 (5/23)  WBC 2.8K today  LFTs 310/290  Ferritin 12k  Blood Cultures no growth  CMV,EBV,HSV PCR neg    Impression:  #Fever  #Transaminitis  #Elevated Ferritin  #Weakness, Unsteady Gait    Findings suggestive of non-infectious etiology, being worked up for HLH   Infectious work up unrevealing, EBV serologies suggestive of past infection unrelated to acute presentation    Recs:  - monitor off antibiotics  - monitor rash- erythema to bilateral UE, faint blanching rash to abdomen  ------------? due to ceftriaxone  ------------Still's is a consideration with high ferritin, she does note joint pain but states its chronic  - follow up heme evaluation  - monitor CBC  - monitor temp  - consider repeat MRI brain    I will be away 5/26 - 5/29. ID service will be available as needed and can be reached at [phone]        -

## 2023-05-25 NOTE — PROGRESS NOTE ADULT - SUBJECTIVE AND OBJECTIVE BOX
Follow Up:  transaminitis    Interval History/ROS:  No acute complaints, feels generally weak. No headache, no N/V/diarrhea, some mild abdominal pain    Allergies  No Known Allergies    ANTIMICROBIALS:      OTHER MEDS:  MEDICATIONS  (STANDING):  acetaminophen     Tablet .. 650 every 6 hours PRN  amitriptyline 50 daily  heparin   Injectable 5000 every 8 hours  heparin  Lock Flush 100 Units/mL Injectable 100 once  ibuprofen  Tablet. 400 every 8 hours PRN  LORazepam     Tablet 0.25 once  melatonin 3 at bedtime PRN  ondansetron Injectable 4 every 8 hours PRN  pregabalin 75 daily    Vital Signs Last 24 Hrs  T(C): 37 (25 May 2023 05:50), Max: 37.7 (24 May 2023 22:30)  T(F): 98.6 (25 May 2023 05:50), Max: 99.8 (24 May 2023 22:30)  HR: 96 (25 May 2023 05:50) (96 - 97)  BP: 101/54 (25 May 2023 05:50) (101/54 - 108/50)  BP(mean): --  RR: 18 (25 May 2023 05:50) (18 - 18)  SpO2: 92% (25 May 2023 05:50) (92% - 92%)    Parameters below as of 25 May 2023 05:50  Patient On (Oxygen Delivery Method): room air    PHYSICAL EXAM:  Constitutional: non-toxic, no distress  HEAD/EYES: anicteric, no conjunctival injection  Cardiac:   nontachycardic  Respiratory:  nonlabored breathing  GI:  soft, non-tender  Neurologic: awake and alert                          11.4   2.80  )-----------( 130      ( 25 May 2023 05:47 )             34.3       05-25    134<L>  |  101  |  5<L>  ----------------------------<  85  3.3<L>   |  23  |  0.51    Ca    8.0<L>      25 May 2023 05:47  Phos  3.1     05-25  Mg     1.90     05-25    TPro  5.4<L>  /  Alb  3.1<L>  /  TBili  0.4  /  DBili  x   /  AST  310<H>  /  ALT  290<H>  /  AlkPhos  82  05-25    MICROBIOLOGY:  Culture - Blood (collected 22 May 2023 07:15)  Source: .Blood Blood-Venous  Preliminary Report (23 May 2023 11:01):    No growth to date.    Culture - Blood (collected 22 May 2023 07:00)  Source: .Blood Blood-Peripheral  Preliminary Report (23 May 2023 11:01):    No growth to date.    Culture - Stool (collected 20 May 2023 12:07)  Source: .Stool Feces  Final Report (22 May 2023 17:54):    No enteric pathogens isolated.    (Stool culture examined for Salmonella,    Shigella, Campylobacter, Aeromonas, Plesiomonas,    Vibrio, E.coli O157 and Yersinia)    Rapid RVP Result: NotDetec (05-23 @ 07:42)  CMVPCR Log: NotDetec Pdt19VL/mL (05-21 @ 10:15)  C Diff by PCR Result: NotDetec (05-20 @ 12:00)  Rapid RVP Result: NotDetec (05-19 @ 22:30)    C Diff by PCR Result: NotDetec (05-20-23 @ 12:00)    RADIOLOGY:  < from: CT Abdomen and Pelvis w/ IV Cont (05.23.23 @ 10:01) >  IMPRESSION:  No acute intra-abdominal process  < end of copied text >

## 2023-05-25 NOTE — PROGRESS NOTE ADULT - ASSESSMENT
57 y/o F PMHx HTN and MS (on ocrelizumab), presented with generalized weakness, fever/chills, and N/V. Persistent fever during admission with labs significant for transaminitis, bicytopenia, and markedly elevated ferritin. S/p BM biopsy for HLH workup.     Last fever 101.6 (5/23)  WBC 2.8K today  LFTs 310/290  Ferritin 12k  Blood Cultures no growth  CMV,EBV,HSV PCR neg    Impression:  #Fever  #Transaminitis  #Elevated Ferritin  #Weakness, Unsteady Gait    Findings suggestive of non-infectious etiology, being worked up for HLH   Infectious work up unrevealing, EBV serologies suggestive of past infection unrelated to acute presentation    Recs:  - monitor off antibiotics  - follow up heme evaluation  - monitor CBC  - monitor temp  - MRI Brain and neurology saurav Sewell MD  Infectious Disease Fellow  Available on [employer] Teams  Before 9AM or after 5PM: [phone]

## 2023-05-25 NOTE — PROGRESS NOTE ADULT - SUBJECTIVE AND OBJECTIVE BOX
PROGRESS NOTE:     Patient is a 58y old  Female who presents with a chief complaint of Fever, nausea, vomiting (25 May 2023 11:30)      SUBJECTIVE / OVERNIGHT EVENTS: No fevers this AM.     ADDITIONAL REVIEW OF SYSTEMS:    MEDICATIONS  (STANDING):  amitriptyline 50 milliGRAM(s) Oral daily  heparin   Injectable 5000 Unit(s) SubCutaneous every 8 hours  heparin  Lock Flush 100 Units/mL Injectable 100 Unit(s) IV Push once  lidocaine 2% Injectable 20 milliLiter(s) Local Injection once  potassium chloride    Tablet ER 40 milliEquivalent(s) Oral every 4 hours  pregabalin 75 milliGRAM(s) Oral daily  sodium chloride 0.9%. 1000 milliLiter(s) (50 mL/Hr) IV Continuous <Continuous>    MEDICATIONS  (PRN):  acetaminophen     Tablet .. 650 milliGRAM(s) Oral every 6 hours PRN Temp greater or equal to 38C (100.4F)  ibuprofen  Tablet. 400 milliGRAM(s) Oral every 8 hours PRN Temp greater or equal to 38C (100.4F)  melatonin 3 milliGRAM(s) Oral at bedtime PRN Insomnia  ondansetron Injectable 4 milliGRAM(s) IV Push every 8 hours PRN Nausea and/or Vomiting      CAPILLARY BLOOD GLUCOSE        I&O's Summary      PHYSICAL EXAM:  Vital Signs Last 24 Hrs  T(C): 37 (25 May 2023 05:50), Max: 37.7 (24 May 2023 22:30)  T(F): 98.6 (25 May 2023 05:50), Max: 99.8 (24 May 2023 22:30)  HR: 96 (25 May 2023 05:50) (96 - 97)  BP: 101/54 (25 May 2023 05:50) (101/54 - 108/50)  BP(mean): --  RR: 18 (25 May 2023 05:50) (18 - 18)  SpO2: 92% (25 May 2023 05:50) (92% - 92%)    Parameters below as of 25 May 2023 05:50  Patient On (Oxygen Delivery Method): room air      CONSTITUTIONAL: NAD, well-developed  RESPIRATORY: Normal respiratory effort; lungs are clear to auscultation bilaterally  CARDIOVASCULAR: Regular rate and rhythm, normal S1 and S2, no murmur/rub/gallop; No lower extremity edema; Peripheral pulses are 2+ bilaterally  ABDOMEN: Nontender to palpation, normoactive bowel sounds, no rebound/guarding; No hepatosplenomegaly  MUSCLOSKELETAL: no clubbing or cyanosis of digits; no joint swelling or tenderness to palpation  PSYCH: A+O to person, place, and time; affect appropriate    LABS:                        11.4   2.80  )-----------( 130      ( 25 May 2023 05:47 )             34.3     05-25    134<L>  |  101  |  5<L>  ----------------------------<  85  3.3<L>   |  23  |  0.51    Ca    8.0<L>      25 May 2023 05:47  Phos  3.1     05-25  Mg     1.90     05-25    TPro  5.4<L>  /  Alb  3.1<L>  /  TBili  0.4  /  DBili  x   /  AST  310<H>  /  ALT  290<H>  /  AlkPhos  82  05-25    PT/INR - ( 25 May 2023 05:47 )   PT: 13.0 sec;   INR: 1.12 ratio                     RADIOLOGY & ADDITIONAL TESTS:  Results Reviewed:   Imaging Personally Reviewed:  Electrocardiogram Personally Reviewed:    COORDINATION OF CARE:  Care Discussed with Consultants/Other Providers [Y/N]:  Prior or Outpatient Records Reviewed [Y/N]:

## 2023-05-25 NOTE — CHART NOTE - NSCHARTNOTEFT_GEN_A_CORE
#Elevated liver enzymes  Reported chronic elevation of AST/ALT in 200s since last year, attributed to genetic condition vs. Ocrevus infusion? Previously followed with GI in [place], no hepatology. Mother  of liver cirrhosis, non alcoholic, unknown etiology. Patient denies severe alcohol use, no new meds or herbal supplements.   - Differentials include superimposed ischemic liver injury in a chronic liver disease due to hypotension on admission. Unknown chronic liver disease at this time as she has been having elevated liver enzymes in the past. Never had prior liver biopsy. Possible infiltrative, vs. autoimmune. vs. DILI at this time, less likely infectious. Ocrevus infusion known to cause diarrhea and colitis, however unlikely to cause liver toxicity.   - Other serologies - Hep B/C/A neg, CMV neg, HSV 1/2 neg. MAYELA 1:20, ASMA neg, hep E Ag IgM neg, EBC PCR neg, ceruplasmin normal, AFP 17,   - US abd showed hepatic steatosis. CT A/P reported liver wnl.    - Liver enzymes stable at this time.     Recommendations:   - Immunoglobulins (IgG, IgM, IgA quantitative) and alpha 1 antitrypsin pending.   - Due to persistent elevation in liver enzymes w/ unclear etiology, recommend liver biopsy.   - Please consult IR for liver biopsy.   - Diet as tolerated.   - CMP, PT/INR, and CBC daily.   - Please call us back after the liver biopsy is completed.     Case was discussed with Dr. Ferrara.     Milton Grfifith, PGY-4  Gastroenterology/Hepatology Fellow  Available on [employer] Teams  [phone] (Short Range Pager)  [phone] (Long Range Pager)    After 5pm, please contact the on-call GI fellow. [phone]

## 2023-05-25 NOTE — PROGRESS NOTE ADULT - PROBLEM SELECTOR PLAN 1
Sepsis/SIRS present on admission, unclear etiology   Initially thought to be d/t gastroenteritis but diarrhea resolving and still remains febrile   -had persistent fevers and bandemia despite antibiotics   -blood and urine cultures NEG to date  -repeat blood cultures NGTD   -GI PCR neg  -CXR w/ clear lungs   -RVP neg   -CT A/P unremarkable   -Patient concerned about radiation exposure and does not want CT chest   -Empiric Ceftriaxone till 5/25   -ID input appreciated

## 2023-05-25 NOTE — PROGRESS NOTE ADULT - PROBLEM SELECTOR PLAN 4
Worsening chronic transaminitis, x20 upper normal level now;   Reports elevated liver enzymes in "200s" since February, which were investigated by her gastroenterologist   -APAP level neg.  -Abd US showing fatty liver  -EBV Ag was positive and likely old infection, EBV PCR negative   -f/up alpha-1 anti-trypsin (phenotype) 212, AFP 9.4   -MAYELA neg, anti-smooth muscle antibody titer high   -f/up immunoglobulins (IgG, IgM, IgA quantitative)   -Appreciate hepatology recs

## 2023-05-25 NOTE — CHART NOTE - NSCHARTNOTEFT_GEN_A_CORE
Hematology/Oncology Procedure Note    Bone Marrow Aspiration/Biopsy    Indication:     Bone marrow aspiration and biopsy procedure description, risks, and benefits were discussed in detail with the patient.  All questions were answered.  Informed consent was obtained and time-out performed.      The area of the right posterior iliac crest was prepped and draped using sterile technique. Local anesthetic with 2% Lidocaine.    Bone marrow aspiration and biopsy  was performed using sterile technique by Dr. Jazmyn Wood with _Sophie Smalls assist and supervision. Specimens were obtained. No complications and less than 2 cc of blood loss.     The procedure was well tolerated and no local bleeding or other complications were observed.  Pressure was applied to the procedure site and a wound dressing was placed.  The patient and nursing staff were advised that the patient is to lie flat for 30 minutes post procedure and not to shower or change the dressing for 24 hours. Tylenol may be used if no contraindications for pain at the procedure site.

## 2023-05-26 DIAGNOSIS — R65.10 SYSTEMIC INFLAMMATORY RESPONSE SYNDROME (SIRS) OF NON-INFECTIOUS ORIGIN WITHOUT ACUTE ORGAN DYSFUNCTION: ICD-10-CM

## 2023-05-26 DIAGNOSIS — R00.0 TACHYCARDIA, UNSPECIFIED: ICD-10-CM

## 2023-05-26 LAB
ALBUMIN SERPL ELPH-MCNC: 3.1 G/DL — LOW (ref 3.3–5)
ALP SERPL-CCNC: 84 U/L — SIGNIFICANT CHANGE UP (ref 40–120)
ALT FLD-CCNC: 247 U/L — HIGH (ref 4–33)
ANION GAP SERPL CALC-SCNC: 12 MMOL/L — SIGNIFICANT CHANGE UP (ref 7–14)
AST SERPL-CCNC: 249 U/L — HIGH (ref 4–32)
BASOPHILS # BLD AUTO: 0.01 K/UL — SIGNIFICANT CHANGE UP (ref 0–0.2)
BASOPHILS NFR BLD AUTO: 0.3 % — SIGNIFICANT CHANGE UP (ref 0–2)
BILIRUB SERPL-MCNC: 0.4 MG/DL — SIGNIFICANT CHANGE UP (ref 0.2–1.2)
BUN SERPL-MCNC: 4 MG/DL — LOW (ref 7–23)
CALCIUM SERPL-MCNC: 7.8 MG/DL — LOW (ref 8.4–10.5)
CHLORIDE SERPL-SCNC: 103 MMOL/L — SIGNIFICANT CHANGE UP (ref 98–107)
CO2 SERPL-SCNC: 22 MMOL/L — SIGNIFICANT CHANGE UP (ref 22–31)
CREAT SERPL-MCNC: 0.49 MG/DL — LOW (ref 0.5–1.3)
EGFR: 109 ML/MIN/1.73M2 — SIGNIFICANT CHANGE UP
EOSINOPHIL # BLD AUTO: 0.21 K/UL — SIGNIFICANT CHANGE UP (ref 0–0.5)
EOSINOPHIL NFR BLD AUTO: 6 % — SIGNIFICANT CHANGE UP (ref 0–6)
GLUCOSE SERPL-MCNC: 87 MG/DL — SIGNIFICANT CHANGE UP (ref 70–99)
HCT VFR BLD CALC: 33.8 % — LOW (ref 34.5–45)
HGB BLD-MCNC: 11.1 G/DL — LOW (ref 11.5–15.5)
HLX FLT3 FINAL REPORT: SIGNIFICANT CHANGE UP
IANC: 1.79 K/UL — LOW (ref 1.8–7.4)
IMM GRANULOCYTES NFR BLD AUTO: 0.9 % — SIGNIFICANT CHANGE UP (ref 0–0.9)
INR BLD: 1.2 RATIO — HIGH (ref 0.88–1.16)
JAK2 P.V617F BLD/T QL: SIGNIFICANT CHANGE UP
LYMPHOCYTES # BLD AUTO: 0.98 K/UL — LOW (ref 1–3.3)
LYMPHOCYTES # BLD AUTO: 28 % — SIGNIFICANT CHANGE UP (ref 13–44)
MAGNESIUM SERPL-MCNC: 1.8 MG/DL — SIGNIFICANT CHANGE UP (ref 1.6–2.6)
MCHC RBC-ENTMCNC: 31.5 PG — SIGNIFICANT CHANGE UP (ref 27–34)
MCHC RBC-ENTMCNC: 32.8 GM/DL — SIGNIFICANT CHANGE UP (ref 32–36)
MCV RBC AUTO: 96 FL — SIGNIFICANT CHANGE UP (ref 80–100)
MONOCYTES # BLD AUTO: 0.48 K/UL — SIGNIFICANT CHANGE UP (ref 0–0.9)
MONOCYTES NFR BLD AUTO: 13.7 % — SIGNIFICANT CHANGE UP (ref 2–14)
NEUTROPHILS # BLD AUTO: 1.79 K/UL — LOW (ref 1.8–7.4)
NEUTROPHILS NFR BLD AUTO: 51.1 % — SIGNIFICANT CHANGE UP (ref 43–77)
NRBC # BLD: 0 /100 WBCS — SIGNIFICANT CHANGE UP (ref 0–0)
NRBC # FLD: 0 K/UL — SIGNIFICANT CHANGE UP (ref 0–0)
PHOSPHATE SERPL-MCNC: 2.5 MG/DL — SIGNIFICANT CHANGE UP (ref 2.5–4.5)
PLATELET # BLD AUTO: 148 K/UL — LOW (ref 150–400)
POTASSIUM SERPL-MCNC: 3.8 MMOL/L — SIGNIFICANT CHANGE UP (ref 3.5–5.3)
POTASSIUM SERPL-SCNC: 3.8 MMOL/L — SIGNIFICANT CHANGE UP (ref 3.5–5.3)
PROT SERPL-MCNC: 5.3 G/DL — LOW (ref 6–8.3)
PROTHROM AB SERPL-ACNC: 14 SEC — HIGH (ref 10.5–13.4)
RBC # BLD: 3.52 M/UL — LOW (ref 3.8–5.2)
RBC # FLD: 13.6 % — SIGNIFICANT CHANGE UP (ref 10.3–14.5)
SODIUM SERPL-SCNC: 137 MMOL/L — SIGNIFICANT CHANGE UP (ref 135–145)
TM INTERPRETATION: SIGNIFICANT CHANGE UP
WBC # BLD: 3.5 K/UL — LOW (ref 3.8–10.5)
WBC # FLD AUTO: 3.5 K/UL — LOW (ref 3.8–10.5)

## 2023-05-26 PROCEDURE — 99232 SBSQ HOSP IP/OBS MODERATE 35: CPT

## 2023-05-26 PROCEDURE — 99232 SBSQ HOSP IP/OBS MODERATE 35: CPT | Mod: GC

## 2023-05-26 PROCEDURE — 93306 TTE W/DOPPLER COMPLETE: CPT | Mod: 26

## 2023-05-26 RX ADMIN — HEPARIN SODIUM 5000 UNIT(S): 5000 INJECTION INTRAVENOUS; SUBCUTANEOUS at 05:16

## 2023-05-26 RX ADMIN — Medication 75 MILLIGRAM(S): at 11:52

## 2023-05-26 RX ADMIN — HEPARIN SODIUM 5000 UNIT(S): 5000 INJECTION INTRAVENOUS; SUBCUTANEOUS at 22:48

## 2023-05-26 RX ADMIN — Medication 50 MILLIGRAM(S): at 11:52

## 2023-05-26 NOTE — CONSULT NOTE ADULT - SUBJECTIVE AND OBJECTIVE BOX
Interventional Radiology    HPI: 59 yo Female with MHx of HTN, MS on Ocrevus, admitted for N/V and fever x 3 weeks. Found to have transaminitis of unknown etiolog since february and was told may be secondary to Ocrevus infusion vs. genetic condition   US abd showed hepatic steatosis. CT A/P reported liver wnl. IR consulted for liver bx to determine etiology.      Allergies: No Known Allergies    Medications (Abx/Cardiac/Anticoagulation/Blood Products)  cefTRIAXone   IVPB: 100 mL/Hr IV Intermittent (05-24 @ 17:17)  heparin   Injectable: 5000 Unit(s) SubCutaneous (05-26 @ 05:16)    Data:    T(C): 37.8  HR: 107  BP: 119/60  RR: 18  SpO2: 97%    -WBC 3.50 / HgB 11.1 / Hct 33.8 / Plt 148  -Na 137 / Cl 103 / BUN 4 / Glucose 87  -K 3.8 / CO2 22 / Cr 0.49  - / Alk Phos 84 / T.Bili 0.4  -INR 1.20 / PTT --    Assessment/Plan: 59 yo F with above stated PMHx now with transaminitis of unknown etiology. IR consult for liver biopsy placed.    -- d/w and approved by Dr. Halaiabeh  -- IR will plan to perform liver biopsy US guided, earliest IR scheduling currently is 6/2. If patient requires discharge before this date, an outpatient procedure can also be scheduled.  -- NPO at midnight Thurs night 6/1   -- hold a.m. anticoagulation night before  -- AM labs CBC, BMP, coags  -- please complete IR pre-procedure note  -- please place IR procedure request order under Dr. Halaibeh    --  Sammi Wills, PGY-3  Diagnostic Radiology  Available on Microsoft Teams    - Non-emergent consults: Place IR consult order in [place]  - Emergent issues (pager): [facility] [phone]; [facility] [phone]; [phone]  - Scheduling questions: [facility] [phone]; [facility] [phone]  - Clinic/outpatient booking: [facility] [phone]; [facility] [phone]

## 2023-05-26 NOTE — PROGRESS NOTE ADULT - ATTENDING COMMENTS
59 y/o F PMHx HTN and MS (on ocrelizumab), presented with generalized weakness, fever/chills, and N/V. Persistent fever during admission with labs significant for transaminitis, cytopenia, and markedly elevated ferritin. S/p BM biopsy for HLH workup.   Last fever 101.6 (5/23)  LFTs trending down  Ferritin 12k  Blood Cultures no growth  CMV, EBV, HSV PCR neg  Overall, Fever, elevated LFTs, weakness  - Monitor off abx for now  - Monitor rash--suspect due to ceftriaxone?  - S/P BM biopsy 5/25, follow up results  - Follow up Heme Onc, Hepatology  - Monitor for signs/symptoms infection    Mitchel Lott MD  Contact on TEAMS messaging from 9am - 5pm  From 5pm-9am, on weekends, or if no response call [phone]    I was physically present for the key portions of the evaluation and management service provided. I saw and examined the patient. I agree with the above history, physical, and plan except for any discrepancies which I have documented in “Attending Statements.” Please refer to “Attending Statements” for final plan.

## 2023-05-26 NOTE — PROGRESS NOTE ADULT - SUBJECTIVE AND OBJECTIVE BOX
Follow Up:  fever, elevated LFTs    Interval History/ROS:    Allergies  No Known Allergies    ANTIMICROBIALS:      OTHER MEDS:  MEDICATIONS  (STANDING):  acetaminophen     Tablet .. 650 every 6 hours PRN  amitriptyline 50 daily  heparin   Injectable 5000 every 8 hours  heparin  Lock Flush 100 Units/mL Injectable 100 once  ibuprofen  Tablet. 400 every 8 hours PRN  melatonin 3 at bedtime PRN  ondansetron Injectable 4 every 8 hours PRN  pregabalin 75 daily    Vital Signs Last 24 Hrs  T(C): 37.8 (26 May 2023 05:21), Max: 37.8 (25 May 2023 14:52)  T(F): 100.1 (26 May 2023 05:21), Max: 100.1 (26 May 2023 05:21)  HR: 107 (26 May 2023 05:21) (98 - 115)  BP: 119/60 (26 May 2023 05:21) (96/48 - 119/60)  BP(mean): --  RR: 18 (26 May 2023 05:21) (18 - 18)  SpO2: 97% (26 May 2023 05:21) (92% - 97%)    Parameters below as of 26 May 2023 05:21  Patient On (Oxygen Delivery Method): room air  O2 Flow (L/min): 2    PHYSICAL EXAM:  Constitutional: non-toxic, no distress  HEAD/EYES: anicteric, no conjunctival injection  ENT:  supple, no thrush  Cardiovascular:   normal S1, S2, no murmur, no edema  Respiratory:  clear BS bilaterally, no wheezes, no rales  GI:  soft, non-tender, normal bowel sounds  :  no chadwick, no CVA tenderness  Musculoskeletal:  no synovitis, normal ROM  Neurologic: awake and alert, normal strength, no focal findings  Skin:  no rash, no erythema, no phlebitis  Heme/Onc: no lymphadenopathy   Psychiatric:  awake, alert, appropriate mood                        11.1   3.50  )-----------( 148      ( 26 May 2023 07:26 )             33.8       05-26    137  |  103  |  4<L>  ----------------------------<  87  3.8   |  22  |  0.49<L>    Ca    7.8<L>      26 May 2023 07:26  Phos  2.5     05-26  Mg     1.80     05-26    TPro  5.3<L>  /  Alb  3.1<L>  /  TBili  0.4  /  DBili  x   /  AST  249<H>  /  ALT  247<H>  /  AlkPhos  84  05-26    MICROBIOLOGY:  Culture - Blood (collected 22 May 2023 07:15)  Source: .Blood Blood-Venous  Preliminary Report (23 May 2023 11:01):    No growth to date.    Culture - Blood (collected 22 May 2023 07:00)  Source: .Blood Blood-Peripheral  Preliminary Report (23 May 2023 11:01):    No growth to date.    Rapid RVP Result: NotDetec (05-23 @ 07:42)  CMVPCR Log: NotDetec Tpr77DA/mL (05-21 @ 10:15)  C Diff by PCR Result: NotDetec (05-20 @ 12:00)  Rapid RVP Result: NotDetec (05-19 @ 22:30)    C Diff by PCR Result: NotDetec (05-20-23 @ 12:00)    RADIOLOGY:  < from: Xray Chest 1 View- PORTABLE-Urgent (Xray Chest 1 View- PORTABLE-Urgent .) (05.23.23 @ 10:16) >  IMPRESSION: Clear lungs without source for fever.  < end of copied text >    < from: CT Abdomen and Pelvis w/ IV Cont (05.23.23 @ 10:01) >  IMPRESSION:  No acute intra-abdominal process  < end of copied text >     Follow Up:  fever, elevated LFTs    Interval History/ROS:  Feels generally weak/malaise. No localizing complaints. No chills, diarrhea, or N/V    Allergies  No Known Allergies    ANTIMICROBIALS:      OTHER MEDS:  MEDICATIONS  (STANDING):  acetaminophen     Tablet .. 650 every 6 hours PRN  amitriptyline 50 daily  heparin   Injectable 5000 every 8 hours  heparin  Lock Flush 100 Units/mL Injectable 100 once  ibuprofen  Tablet. 400 every 8 hours PRN  melatonin 3 at bedtime PRN  ondansetron Injectable 4 every 8 hours PRN  pregabalin 75 daily    Vital Signs Last 24 Hrs  T(C): 37.8 (26 May 2023 05:21), Max: 37.8 (25 May 2023 14:52)  T(F): 100.1 (26 May 2023 05:21), Max: 100.1 (26 May 2023 05:21)  HR: 107 (26 May 2023 05:21) (98 - 115)  BP: 119/60 (26 May 2023 05:21) (96/48 - 119/60)  BP(mean): --  RR: 18 (26 May 2023 05:21) (18 - 18)  SpO2: 97% (26 May 2023 05:21) (92% - 97%)    Parameters below as of 26 May 2023 05:21  Patient On (Oxygen Delivery Method): room air  O2 Flow (L/min): 2    PHYSICAL EXAM:  Constitutional: non-toxic, no distress  HEAD/EYES: anicteric, no conjunctival injection  Cardiac:   nontachycardic  Respiratory:  nonlabored breathing  GI:  soft, non-tender  Neurologic: awake and alert, no focal findings  Skin:  redness upper chest                        11.1   3.50  )-----------( 148      ( 26 May 2023 07:26 )             33.8       05-26    137  |  103  |  4<L>  ----------------------------<  87  3.8   |  22  |  0.49<L>    Ca    7.8<L>      26 May 2023 07:26  Phos  2.5     05-26  Mg     1.80     05-26    TPro  5.3<L>  /  Alb  3.1<L>  /  TBili  0.4  /  DBili  x   /  AST  249<H>  /  ALT  247<H>  /  AlkPhos  84  05-26    MICROBIOLOGY:  Culture - Blood (collected 22 May 2023 07:15)  Source: .Blood Blood-Venous  Preliminary Report (23 May 2023 11:01):    No growth to date.    Culture - Blood (collected 22 May 2023 07:00)  Source: .Blood Blood-Peripheral  Preliminary Report (23 May 2023 11:01):    No growth to date.    Rapid RVP Result: NotDetec (05-23 @ 07:42)  CMVPCR Log: NotDetec Nwb78YW/mL (05-21 @ 10:15)  C Diff by PCR Result: NotDetec (05-20 @ 12:00)  Rapid RVP Result: NotDetec (05-19 @ 22:30)    C Diff by PCR Result: NotDetec (05-20-23 @ 12:00)    RADIOLOGY:  < from: Xray Chest 1 View- PORTABLE-Urgent (Xray Chest 1 View- PORTABLE-Urgent .) (05.23.23 @ 10:16) >  IMPRESSION: Clear lungs without source for fever.  < end of copied text >    < from: CT Abdomen and Pelvis w/ IV Cont (05.23.23 @ 10:01) >  IMPRESSION:  No acute intra-abdominal process  < end of copied text >

## 2023-05-26 NOTE — CHART NOTE - NSCHARTNOTEFT_GEN_A_CORE
Pt refusing IR liver biopsy at this time. Discussed indications of the test and risks and benefits of the test.   Will cancel IR consult order and reconsult IR if pt is agreeable.     Discussed with Dr. Kristopher Conrad PA-C  Department of Medicine  Pager [phone] Dali Conrad PA-C  Department of Medicine  Pager [phone]

## 2023-05-26 NOTE — PROGRESS NOTE ADULT - SUBJECTIVE AND OBJECTIVE BOX
Hematology Oncology Follow-up    INTERVAL HPI/OVERNIGHT EVENTS:  No o/n events.   Patient with improved fevers today ~100.   Still tachycardic 107 today-no EKG was done   CT chest id pending     Hematology is following for pancytopenia elevated Ferritin and fevers concern for HLH.     VITAL SIGNS:  T(F): 100.1 (05-26-23 @ 05:21)  HR: 107 (05-26-23 @ 05:21)  BP: 119/60 (05-26-23 @ 05:21)  RR: 18 (05-26-23 @ 05:21)  SpO2: 97% (05-26-23 @ 05:21)  Wt(kg): --    05-25-23 @ 07:01  -  05-26-23 @ 07:00  --------------------------------------------------------  IN: 600 mL / OUT: 0 mL / NET: 600 mL      PHYSICAL EXAM:  Constitutional: NAD  Respiratory: CTA b/l, symmetric chest rise, with normal respiratory effort  Cardiovascular: RRR  Gastrointestinal: soft, NTND  Extremities:  no edema  MSK: no obvious abnormalities  Neurological: Grossly intact  Skin: no rash, no echymoses, no petichiae  Psych: normal affect    MEDICATIONS  (STANDING):  amitriptyline 50 milliGRAM(s) Oral daily  heparin   Injectable 5000 Unit(s) SubCutaneous every 8 hours  heparin  Lock Flush 100 Units/mL Injectable 100 Unit(s) IV Push once  lidocaine 2% Injectable 20 milliLiter(s) Local Injection once  potassium chloride    Tablet ER 40 milliEquivalent(s) Oral every 4 hours  pregabalin 75 milliGRAM(s) Oral daily  sodium chloride 0.9%. 1000 milliLiter(s) (50 mL/Hr) IV Continuous <Continuous>    MEDICATIONS  (PRN):  acetaminophen     Tablet .. 650 milliGRAM(s) Oral every 6 hours PRN Temp greater or equal to 38C (100.4F)  ibuprofen  Tablet. 400 milliGRAM(s) Oral every 8 hours PRN Temp greater or equal to 38C (100.4F)  melatonin 3 milliGRAM(s) Oral at bedtime PRN Insomnia  ondansetron Injectable 4 milliGRAM(s) IV Push every 8 hours PRN Nausea and/or Vomiting      No Known Allergies      LABS:                        11.1   3.50  )-----------( 148      ( 26 May 2023 07:26 )             33.8     05-26    137  |  103  |  4<L>  ----------------------------<  87  3.8   |  22  |  0.49<L>    Ca    7.8<L>      26 May 2023 07:26  Phos  2.5     05-26  Mg     1.80     05-26    TPro  5.3<L>  /  Alb  3.1<L>  /  TBili  0.4  /  DBili  x   /  AST  249<H>  /  ALT  247<H>  /  AlkPhos  84  05-26    PT/INR - ( 26 May 2023 07:26 )   PT: 14.0 sec;   INR: 1.20 ratio                Ferritin, Serum: 9404 ng/mL *H* (05-25-23 @ 05:47)  Ferritin, Serum: 15425 ng/mL *H* (05-23-23 @ 07:59)            RADIOLOGY & ADDITIONAL TESTS:  Studies reviewed.

## 2023-05-26 NOTE — PROGRESS NOTE ADULT - ASSESSMENT
59 y/o F PMHx HTN and MS (on ocrelizumab), presented with generalized weakness, fever/chills, and N/V. Persistent fever during admission with labs significant for transaminitis, bicytopenia, and markedly elevated ferritin. S/p BM biopsy for HLH workup.     Last fever 101.6 (5/23)  Ferritin 12k  Blood Cultures no growth  CMV,EBV,HSV PCR neg    Impression:  #Fever  #Transaminitis  #Elevated Ferritin  #Weakness, Unsteady Gait    Findings suggestive of non-infectious etiology, ?HLH vs Still's   Infectious work up unrevealing, EBV serologies suggestive of past infection unrelated to acute presentation    Recs:  - monitor off antibiotics  - follow up BM biopsy results  - monitor CBC  - monitor temp  - pending MRI Brain       Vince Sewell MD  Infectious Disease Fellow  Available on [employer] Teams  Before 9AM or after 5PM: [phone]

## 2023-05-26 NOTE — PROGRESS NOTE ADULT - PROBLEM SELECTOR PLAN 4
-obtain EKG  -obtain TTE  -patient refusing CTA chest at this time d/t radiation, will send d-dimer and revisit w/ patient if elevated  -monitor HR

## 2023-05-26 NOTE — PROGRESS NOTE ADULT - ASSESSMENT
59 yo Female with MHx of HTN, MS on Ocrevus, admitted for N/V and fever x 3 weeks.   Patient was found to have elevated stable AST/ALT ~>200, which she reports since february and was told may be secondary to Ocrevus infusion vs. genetic condition (Mother  of liver cirrhosis, non alcoholic, unknown etiology).  US abd showed hepatic steatosis.   CT A/P reported liver wnl.      Hematology is following up for  pancytopenia, fever and elevated ferritin concern for HLH   VSS Patient's temp slowly improving, however she is still tachycardic.   WBC low 2.1>>improving to 3.5 today. PLT improved to 148 from 101. Hb slowly down trending 11.1 today.   Ferritin initially >12K>>9K yesterday   Tg 205, fibrinogen elevated   LFTs stable at >200.  S/p BM BX on 23 with hematology   Patient is on ABx for possible UTI?       Impression:  #Concern for HLH: patient with fevers, elevated ferritin, bicytopenia concern for HLH.         -The diagnosis of HLH is made based on the five of the following nine findings: Fever =38.5°C.  Splenomegaly. Bicytopenia. Hypertriglyceridemia (fasting triglycerides >265 mg/dL) and/or hypofibrinogenemia (fibrinogen <150 mg/dL). Hemophagocytosis in bone marrow, spleen, lymph node, or liver Low or absent NK cell activity. Ferritin >500 ng/mL Elevated soluble CD25 (soluble IL-2 receptor alpha [Faith-2R]) two standard deviations above age-adjusted laboratory-specific norms  Elevated CXCL9. Currently with fevers, cytopenias and elevated ferritin (however it is improving slowly with no treatment). Tg <265, fibrinogen is not decreased, no splenomegaly on CT A/P.   -Repeat ferritin improved to 9K.     -BM BX was done on 23 will expedite the report  -Per hepatology will need liver bx  -Please complete CT Chest to r/o PE  -Please obtain EKG for tachycardia   -Please obtain TTE  -Please obtain immunoglobulin levels   -Transfuse to maintain Hb>7 and PLT>10K; >20K if febrile, >50K if bleeding         Thank you for the consult, Hematology will continue to follow.     Case discussed with Dr. Kendrick Eldridge MD.   PGY-4 hematology/oncology fellow    59 yo Female with MHx of HTN, MS on Ocrevus, admitted for N/V and fever x 3 weeks.   Patient was found to have elevated stable AST/ALT ~>200, which she reports since february and was told may be secondary to Ocrevus infusion vs. genetic condition (Mother  of liver cirrhosis, non alcoholic, unknown etiology).  US abd showed hepatic steatosis.   CT A/P reported liver wnl.      Hematology is following up for  pancytopenia, fever and elevated ferritin concern for HLH   VSS Patient's temp slowly improving, however she is still tachycardic.   WBC low 2.1>>improving to 3.5 today. PLT improved to 148 from 101. Hb slowly down trending 11.1 today.   Ferritin initially >12K>>9K yesterday   Tg 205, fibrinogen elevated   LFTs stable at >200.  S/p BM BX on 23 with hematology   Patient is on ABx for possible UTI?       Impression:  #Concern for HLH: patient with fevers, elevated ferritin, bicytopenia concern for HLH.         -The diagnosis of HLH is made based on the five of the following nine findings: Fever =38.5°C.  Splenomegaly. Bicytopenia. Hypertriglyceridemia (fasting triglycerides >265 mg/dL) and/or hypofibrinogenemia (fibrinogen <150 mg/dL). Hemophagocytosis in bone marrow, spleen, lymph node, or liver Low or absent NK cell activity. Ferritin >500 ng/mL Elevated soluble CD25 (soluble IL-2 receptor alpha [Faith-2R]) two standard deviations above age-adjusted laboratory-specific norms  Elevated CXCL9. Currently with fevers, cytopenias and elevated ferritin (however it is improving slowly with no treatment). Tg <265, fibrinogen is not decreased, no splenomegaly on CT A/P.   -Repeat ferritin improved to 9K.     -FLOW: No cellular spicules are present; very few hematopoietic elements are identified- those  include mature and maturing myeloids, lymphocytes and occasional plasma cells.  CYTOSPIN: Mature and maturing myeloids, normoblasts and monocytes.  -BM BX was done on 23 will expedite the report  -Per hepatology will need liver bx  -Please complete CT Chest to r/o PE  -Please obtain EKG for tachycardia   -Please obtain TTE  -Please obtain immunoglobulin levels   -Transfuse to maintain Hb>7 and PLT>10K; >20K if febrile, >50K if bleeding         Thank you for the consult, Hematology will continue to follow.     Case discussed with Dr. Kendrick Eldridge MD.   PGY-4 hematology/oncology fellow    59 yo Female with MHx of HTN, MS on Ocrevus, admitted for N/V and fever x 3 weeks.   Patient was found to have elevated stable AST/ALT ~>200, which she reports since february and was told may be secondary to Ocrevus infusion vs. genetic condition (Mother  of liver cirrhosis, non alcoholic, unknown etiology).  US abd showed hepatic steatosis.   CT A/P reported liver wnl.      Hematology is following up for  pancytopenia, fever and elevated ferritin concern for HLH   VSS Patient's temp slowly improving, however she is still tachycardic.   WBC low 2.1>>improving to 3.5 today. PLT improved to 148 from 101. Hb slowly down trending 11.1 today.   Ferritin initially >12K>>9K yesterday   Tg 205, fibrinogen elevated   LFTs stable at >200.  S/p BM BX on 23 : wet read L: BM wet read: no definitive hemophagocytosis however there are megakaryocytes within the sinusoids which is the feature that may seen in reactive condition such as in HLH. Pending further staining. Complete report to be out on Tuesday.      Impression:  #Concern for HLH: patient with fevers, elevated ferritin, bicytopenia concern for HLH.     -The diagnosis of HLH is made based on the five of the following nine findings: Fever =38.5°C.  Splenomegaly. Bicytopenia. Hypertriglyceridemia (fasting triglycerides >265 mg/dL) and/or hypofibrinogenemia (fibrinogen <150 mg/dL). Hemophagocytosis in bone marrow, spleen, lymph node, or liver Low or absent NK cell activity. Ferritin >500 ng/mL Elevated soluble CD25 (soluble IL-2 receptor alpha [Faith-2R]) two standard deviations above age-adjusted laboratory-specific norms  Elevated CXCL9. Currently with fevers, cytopenias and elevated ferritin (however it is improving slowly with no treatment). Tg <265, fibrinogen is not decreased, no splenomegaly on CT A/P.   -Repeat ferritin improved to 9K.     -FLOW: No cellular spicules are present; very few hematopoietic elements are identified- those include mature and maturing myeloids, lymphocytes and occasional plasma cells. CYTOSPIN: Mature and maturing myeloids, normoblasts and monocytes.  -BM BX with no  definitive hemophagocytosis pending further staining.   -Per hepatology will need liver bx  -Please complete CT Chest to r/o PE  -Please obtain EKG for tachycardia   -Please obtain TTE  -Please obtain immunoglobulin levels   -Transfuse to maintain Hb>7 and PLT>10K; >20K if febrile, >50K if bleeding         Thank you for the consult, Hematology will continue to follow.     Case discussed with Dr. Kendrick Eldridge MD.   PGY-4 hematology/oncology fellow

## 2023-05-26 NOTE — PROGRESS NOTE ADULT - PROBLEM SELECTOR PLAN 2
Elevated Ferritin w/ bicytopenia, elevated LFT's, and persistent fevers. Ruling out HLH   -Ferritin 30887,   -triglyceride levels 205, Fibrinogen wnl   -send immunoglobulin levels   -s/p bone marrow biopsy 5/25, f/up results   -Heme input appreciated

## 2023-05-26 NOTE — PROGRESS NOTE ADULT - PROBLEM SELECTOR PLAN 3
Worsening chronic transaminitis  Reports elevated liver enzymes in "200s" since February, which were investigated by her gastroenterologist   -APAP level neg.  -Abd US showing fatty liver  -EBV Ag was positive and likely old infection, EBV PCR negative   -f/up alpha-1 anti-trypsin (phenotype) 212, AFP 9.4   -MAYELA neg, anti-smooth muscle antibody titer high   -f/up immunoglobulins (IgG, IgM, IgA quantitative)   -IR consulted for liver biopsy, tentatively planned for 6/2 but patient wants to think about it   -Appreciate hepatology recs

## 2023-05-26 NOTE — PROGRESS NOTE ADULT - SUBJECTIVE AND OBJECTIVE BOX
PROGRESS NOTE:     Patient is a 58y old  Female who presents with a chief complaint of Fever, nausea, vomiting (26 May 2023 12:06)      SUBJECTIVE / OVERNIGHT EVENTS: Fevers noted.      ADDITIONAL REVIEW OF SYSTEMS:    MEDICATIONS  (STANDING):  amitriptyline 50 milliGRAM(s) Oral daily  heparin   Injectable 5000 Unit(s) SubCutaneous every 8 hours  heparin  Lock Flush 100 Units/mL Injectable 100 Unit(s) IV Push once  lidocaine 2% Injectable 20 milliLiter(s) Local Injection once  LORazepam     Tablet 0.25 milliGRAM(s) Oral once  potassium chloride    Tablet ER 40 milliEquivalent(s) Oral every 4 hours  pregabalin 75 milliGRAM(s) Oral daily  sodium chloride 0.9%. 1000 milliLiter(s) (50 mL/Hr) IV Continuous <Continuous>    MEDICATIONS  (PRN):  acetaminophen     Tablet .. 650 milliGRAM(s) Oral every 6 hours PRN Temp greater or equal to 38C (100.4F)  ibuprofen  Tablet. 400 milliGRAM(s) Oral every 8 hours PRN Temp greater or equal to 38C (100.4F)  melatonin 3 milliGRAM(s) Oral at bedtime PRN Insomnia  ondansetron Injectable 4 milliGRAM(s) IV Push every 8 hours PRN Nausea and/or Vomiting      CAPILLARY BLOOD GLUCOSE        I&O's Summary    25 May 2023 07:01  -  26 May 2023 07:00  --------------------------------------------------------  IN: 600 mL / OUT: 0 mL / NET: 600 mL        PHYSICAL EXAM:  Vital Signs Last 24 Hrs  T(C): 37.8 (26 May 2023 05:21), Max: 37.8 (25 May 2023 14:52)  T(F): 100.1 (26 May 2023 05:21), Max: 100.1 (26 May 2023 05:21)  HR: 107 (26 May 2023 05:21) (103 - 115)  BP: 119/60 (26 May 2023 05:21) (96/48 - 119/60)  BP(mean): --  RR: 18 (26 May 2023 05:21) (18 - 18)  SpO2: 97% (26 May 2023 05:21) (92% - 97%)    Parameters below as of 26 May 2023 05:21  Patient On (Oxygen Delivery Method): room air  O2 Flow (L/min): 2      CONSTITUTIONAL: NAD, well-developed  RESPIRATORY: Normal respiratory effort; lungs are clear to auscultation bilaterally  CARDIOVASCULAR: Regular rate and rhythm, normal S1 and S2, no murmur/rub/gallop; No lower extremity edema; Peripheral pulses are 2+ bilaterally  ABDOMEN: Nontender to palpation, normoactive bowel sounds, no rebound/guarding; No hepatosplenomegaly  MUSCLOSKELETAL: no clubbing or cyanosis of digits; no joint swelling or tenderness to palpation  PSYCH: A+O to person, place, and time; affect appropriate      LABS:                        11.1   3.50  )-----------( 148      ( 26 May 2023 07:26 )             33.8     05-26    137  |  103  |  4<L>  ----------------------------<  87  3.8   |  22  |  0.49<L>    Ca    7.8<L>      26 May 2023 07:26  Phos  2.5     05-26  Mg     1.80     05-26    TPro  5.3<L>  /  Alb  3.1<L>  /  TBili  0.4  /  DBili  x   /  AST  249<H>  /  ALT  247<H>  /  AlkPhos  84  05-26    PT/INR - ( 26 May 2023 07:26 )   PT: 14.0 sec;   INR: 1.20 ratio                     RADIOLOGY & ADDITIONAL TESTS:  Results Reviewed:   Imaging Personally Reviewed:  Electrocardiogram Personally Reviewed:    COORDINATION OF CARE:  Care Discussed with Consultants/Other Providers [Y/N]:  Prior or Outpatient Records Reviewed [Y/N]:

## 2023-05-26 NOTE — PROGRESS NOTE ADULT - PROBLEM SELECTOR PLAN 1
Sepsis/SIRS present on admission, unclear etiology   Initially thought to be d/t gastroenteritis but diarrhea resolving and still remains febrile   -persistent fevers despite antibiotics   -blood and urine cultures NEG to date  -repeat blood cultures NGTD   -GI PCR neg  -CXR w/ clear lungs   -RVP neg   -CT A/P unremarkable   -Patient concerned about radiation exposure and does not want CTA chest   -s/p empiric Ceftriaxone till 5/25   -obtain MRI brain to r/o PML   -ID input appreciated

## 2023-05-26 NOTE — PROGRESS NOTE ADULT - ATTENDING COMMENTS
57 yo Female with PMH of HTN, MS (on Ocrevus every 6 months, last 2/2023) c/o 3 days of generalized weakness, fever and chills with associated vomiting. She developed fevers and chills one months ago which resolved after several weeks and then recurred several days ago at which point she sought medical attention. The fevers have been as high as 103. On evaluation she has bicytopenia with wbc 2.3 and plts 118 and normal hgb. She has had known elevated liver enzymes in "200s" since February, which were investigated by her gastroenterologist (unclear etiology). She now has AST//247, normal bilirubin, , ferritin 18180 and anti SMA +1:20. CT A/P: No acute intra-abdominal process  CMV PCR neg Hepatitis panel neg COVID neg. Given fever, bicytopenia and markedly elevated ferritin will pursue evaluation for possible HLH.  Peripheral smear with RBC normal in size and number, decreased WBC with normal morphology, PLTs appears normal.   Additional work up reveals TG <265, fibrinogen is not decreased, no splenomegaly on CT A/P. Repeat ferritin improved to 9K. BM BX with no  definitive hemophagocytosis pending further staining for final report. Without additional findings to confirm HLH and with ferritin decreasing spontaneously, suspicion is decreased. Hematology will continue to follow with you.

## 2023-05-27 LAB
ALBUMIN SERPL ELPH-MCNC: 3 G/DL — LOW (ref 3.3–5)
ALP SERPL-CCNC: 84 U/L — SIGNIFICANT CHANGE UP (ref 40–120)
ALT FLD-CCNC: 207 U/L — HIGH (ref 4–33)
ANION GAP SERPL CALC-SCNC: 12 MMOL/L — SIGNIFICANT CHANGE UP (ref 7–14)
AST SERPL-CCNC: 176 U/L — HIGH (ref 4–32)
BASOPHILS # BLD AUTO: 0.03 K/UL — SIGNIFICANT CHANGE UP (ref 0–0.2)
BASOPHILS NFR BLD AUTO: 0.5 % — SIGNIFICANT CHANGE UP (ref 0–2)
BILIRUB SERPL-MCNC: 0.4 MG/DL — SIGNIFICANT CHANGE UP (ref 0.2–1.2)
BUN SERPL-MCNC: 4 MG/DL — LOW (ref 7–23)
CALCIUM SERPL-MCNC: 8.1 MG/DL — LOW (ref 8.4–10.5)
CHLORIDE SERPL-SCNC: 103 MMOL/L — SIGNIFICANT CHANGE UP (ref 98–107)
CO2 SERPL-SCNC: 20 MMOL/L — LOW (ref 22–31)
CREAT SERPL-MCNC: 0.46 MG/DL — LOW (ref 0.5–1.3)
CULTURE RESULTS: SIGNIFICANT CHANGE UP
CULTURE RESULTS: SIGNIFICANT CHANGE UP
D DIMER BLD IA.RAPID-MCNC: 947 NG/ML DDU — HIGH
EGFR: 111 ML/MIN/1.73M2 — SIGNIFICANT CHANGE UP
EOSINOPHIL # BLD AUTO: 0.21 K/UL — SIGNIFICANT CHANGE UP (ref 0–0.5)
EOSINOPHIL NFR BLD AUTO: 3.3 % — SIGNIFICANT CHANGE UP (ref 0–6)
GLUCOSE SERPL-MCNC: 94 MG/DL — SIGNIFICANT CHANGE UP (ref 70–99)
HCT VFR BLD CALC: 33.9 % — LOW (ref 34.5–45)
HCT VFR BLD CALC: 34.7 % — SIGNIFICANT CHANGE UP (ref 34.5–45)
HGB BLD-MCNC: 11.6 G/DL — SIGNIFICANT CHANGE UP (ref 11.5–15.5)
HGB BLD-MCNC: 11.6 G/DL — SIGNIFICANT CHANGE UP (ref 11.5–15.5)
IANC: 4.22 K/UL — SIGNIFICANT CHANGE UP (ref 1.8–7.4)
IMM GRANULOCYTES NFR BLD AUTO: 1.7 % — HIGH (ref 0–0.9)
LYMPHOCYTES # BLD AUTO: 0.99 K/UL — LOW (ref 1–3.3)
LYMPHOCYTES # BLD AUTO: 15.7 % — SIGNIFICANT CHANGE UP (ref 13–44)
MAGNESIUM SERPL-MCNC: 1.8 MG/DL — SIGNIFICANT CHANGE UP (ref 1.6–2.6)
MCHC RBC-ENTMCNC: 31.7 PG — SIGNIFICANT CHANGE UP (ref 27–34)
MCHC RBC-ENTMCNC: 31.9 PG — SIGNIFICANT CHANGE UP (ref 27–34)
MCHC RBC-ENTMCNC: 33.4 GM/DL — SIGNIFICANT CHANGE UP (ref 32–36)
MCHC RBC-ENTMCNC: 34.2 GM/DL — SIGNIFICANT CHANGE UP (ref 32–36)
MCV RBC AUTO: 92.6 FL — SIGNIFICANT CHANGE UP (ref 80–100)
MCV RBC AUTO: 95.3 FL — SIGNIFICANT CHANGE UP (ref 80–100)
MONOCYTES # BLD AUTO: 0.73 K/UL — SIGNIFICANT CHANGE UP (ref 0–0.9)
MONOCYTES NFR BLD AUTO: 11.6 % — SIGNIFICANT CHANGE UP (ref 2–14)
NEUTROPHILS # BLD AUTO: 4.22 K/UL — SIGNIFICANT CHANGE UP (ref 1.8–7.4)
NEUTROPHILS NFR BLD AUTO: 67.2 % — SIGNIFICANT CHANGE UP (ref 43–77)
NRBC # BLD: 0 /100 WBCS — SIGNIFICANT CHANGE UP (ref 0–0)
NRBC # BLD: 0 /100 WBCS — SIGNIFICANT CHANGE UP (ref 0–0)
NRBC # FLD: 0 K/UL — SIGNIFICANT CHANGE UP (ref 0–0)
NRBC # FLD: 0 K/UL — SIGNIFICANT CHANGE UP (ref 0–0)
PHOSPHATE SERPL-MCNC: 2.8 MG/DL — SIGNIFICANT CHANGE UP (ref 2.5–4.5)
PLATELET # BLD AUTO: 164 K/UL — SIGNIFICANT CHANGE UP (ref 150–400)
PLATELET # BLD AUTO: 181 K/UL — SIGNIFICANT CHANGE UP (ref 150–400)
POTASSIUM SERPL-MCNC: 3.6 MMOL/L — SIGNIFICANT CHANGE UP (ref 3.5–5.3)
POTASSIUM SERPL-SCNC: 3.6 MMOL/L — SIGNIFICANT CHANGE UP (ref 3.5–5.3)
PROT SERPL-MCNC: 5.5 G/DL — LOW (ref 6–8.3)
RBC # BLD: 3.64 M/UL — LOW (ref 3.8–5.2)
RBC # BLD: 3.66 M/UL — LOW (ref 3.8–5.2)
RBC # FLD: 13.5 % — SIGNIFICANT CHANGE UP (ref 10.3–14.5)
RBC # FLD: 13.6 % — SIGNIFICANT CHANGE UP (ref 10.3–14.5)
SODIUM SERPL-SCNC: 135 MMOL/L — SIGNIFICANT CHANGE UP (ref 135–145)
SPECIMEN SOURCE: SIGNIFICANT CHANGE UP
SPECIMEN SOURCE: SIGNIFICANT CHANGE UP
WBC # BLD: 5.86 K/UL — SIGNIFICANT CHANGE UP (ref 3.8–10.5)
WBC # BLD: 6.29 K/UL — SIGNIFICANT CHANGE UP (ref 3.8–10.5)
WBC # FLD AUTO: 5.86 K/UL — SIGNIFICANT CHANGE UP (ref 3.8–10.5)
WBC # FLD AUTO: 6.29 K/UL — SIGNIFICANT CHANGE UP (ref 3.8–10.5)

## 2023-05-27 PROCEDURE — 99232 SBSQ HOSP IP/OBS MODERATE 35: CPT

## 2023-05-27 PROCEDURE — 93010 ELECTROCARDIOGRAM REPORT: CPT

## 2023-05-27 RX ADMIN — Medication 650 MILLIGRAM(S): at 21:00

## 2023-05-27 RX ADMIN — HEPARIN SODIUM 5000 UNIT(S): 5000 INJECTION INTRAVENOUS; SUBCUTANEOUS at 13:14

## 2023-05-27 RX ADMIN — Medication 75 MILLIGRAM(S): at 13:13

## 2023-05-27 RX ADMIN — Medication 650 MILLIGRAM(S): at 22:00

## 2023-05-27 RX ADMIN — HEPARIN SODIUM 5000 UNIT(S): 5000 INJECTION INTRAVENOUS; SUBCUTANEOUS at 05:27

## 2023-05-27 RX ADMIN — HEPARIN SODIUM 5000 UNIT(S): 5000 INJECTION INTRAVENOUS; SUBCUTANEOUS at 22:49

## 2023-05-27 RX ADMIN — Medication 50 MILLIGRAM(S): at 13:14

## 2023-05-27 NOTE — PROGRESS NOTE ADULT - PROBLEM SELECTOR PLAN 4
-TTE w/ grossly normal left ventricular systolic function, normal RV   -d-dimer 947   -patient now agrees to CTA chest   -monitor HR

## 2023-05-27 NOTE — PROGRESS NOTE ADULT - PROBLEM SELECTOR PLAN 2
Elevated Ferritin w/ bicytopenia, elevated LFT's, and persistent fevers. Ruling out HLH   -Ferritin 37370,   -triglyceride levels 205, Fibrinogen wnl   -f/up immunoglobulin levels   -s/p bone marrow biopsy 5/25, f/up results   -Heme input appreciated

## 2023-05-27 NOTE — PROGRESS NOTE ADULT - PROBLEM SELECTOR PLAN 3
Worsening chronic transaminitis  Reports elevated liver enzymes in "200s" since February, which were investigated by her gastroenterologist   -APAP level neg.  -Abd US showing fatty liver  -EBV Ag was positive and likely old infection, EBV PCR negative   -f/up alpha-1 anti-trypsin (phenotype) 212, AFP 9.4   -MAYELA neg, anti-smooth muscle antibody titer high   -f/up immunoglobulins (IgG, IgM, IgA quantitative)   -IR consulted for liver biopsy, tentatively planned for 6/2   -Appreciate hepatology recs

## 2023-05-27 NOTE — PROGRESS NOTE ADULT - ASSESSMENT
59yo Female with MHx of HTN, MS a/w SIRS of unclear etiology, c/b hypotension and SUSAN; Found to have transaminitis

## 2023-05-27 NOTE — PROGRESS NOTE ADULT - PROBLEM SELECTOR PLAN 1
Sepsis/SIRS present on admission, unclear etiology   Initially thought to be d/t gastroenteritis but diarrhea resolving and still remains febrile   -persistent fevers despite antibiotics   -blood and urine cultures NEG to date  -repeat blood cultures NGTD   -GI PCR neg  -CXR w/ clear lungs   -RVP neg   -CT A/P unremarkable   -s/p empiric Ceftriaxone till 5/25   -obtain MRI brain to r/o PML   -ID input appreciated

## 2023-05-27 NOTE — CHART NOTE - NSCHARTNOTEFT_GEN_A_CORE
does not want to sign consent for MRI - she has multiple sclerosis and knows what MRI's are like and states she will not tolerate it today.

## 2023-05-27 NOTE — PROGRESS NOTE ADULT - SUBJECTIVE AND OBJECTIVE BOX
PROGRESS NOTE:     Patient is a 58y old  Female who presents with a chief complaint of Fever, nausea, vomiting (26 May 2023 12:56)      SUBJECTIVE / OVERNIGHT EVENTS: Pt feels anxious, still w/ low grade fevers.     ADDITIONAL REVIEW OF SYSTEMS:    MEDICATIONS  (STANDING):  amitriptyline 50 milliGRAM(s) Oral daily  heparin   Injectable 5000 Unit(s) SubCutaneous every 8 hours  heparin  Lock Flush 100 Units/mL Injectable 100 Unit(s) IV Push once  lidocaine 2% Injectable 20 milliLiter(s) Local Injection once  LORazepam     Tablet 0.25 milliGRAM(s) Oral once  potassium chloride    Tablet ER 40 milliEquivalent(s) Oral every 4 hours  pregabalin 75 milliGRAM(s) Oral daily    MEDICATIONS  (PRN):  acetaminophen     Tablet .. 650 milliGRAM(s) Oral every 6 hours PRN Temp greater or equal to 38C (100.4F)  ibuprofen  Tablet. 400 milliGRAM(s) Oral every 8 hours PRN Temp greater or equal to 38C (100.4F)  melatonin 3 milliGRAM(s) Oral at bedtime PRN Insomnia  ondansetron Injectable 4 milliGRAM(s) IV Push every 8 hours PRN Nausea and/or Vomiting      CAPILLARY BLOOD GLUCOSE        I&O's Summary      PHYSICAL EXAM:  Vital Signs Last 24 Hrs  T(C): 36.7 (27 May 2023 11:17), Max: 37.8 (27 May 2023 05:30)  T(F): 98.1 (27 May 2023 11:17), Max: 100 (27 May 2023 05:30)  HR: 111 (27 May 2023 11:17) (107 - 111)  BP: 118/62 (27 May 2023 11:17) (109/52 - 118/62)  BP(mean): --  RR: 18 (27 May 2023 11:17) (18 - 18)  SpO2: 95% (27 May 2023 11:17) (95% - 98%)    Parameters below as of 27 May 2023 11:17  Patient On (Oxygen Delivery Method): room air      CONSTITUTIONAL: NAD, well-developed  RESPIRATORY: Normal respiratory effort; lungs are clear to auscultation bilaterally  CARDIOVASCULAR: Regular rate and rhythm, normal S1 and S2, no murmur/rub/gallop; No lower extremity edema; Peripheral pulses are 2+ bilaterally  ABDOMEN: Nontender to palpation, normoactive bowel sounds, no rebound/guarding; No hepatosplenomegaly  MUSCLOSKELETAL: no clubbing or cyanosis of digits; no joint swelling or tenderness to palpation  PSYCH: A+O to person, place, and time; affect appropriate    LABS:                        11.6   5.86  )-----------( 164      ( 27 May 2023 08:02 )             34.7     05-27    135  |  103  |  4<L>  ----------------------------<  94  3.6   |  20<L>  |  0.46<L>    Ca    8.1<L>      27 May 2023 08:02  Phos  2.8     05-27  Mg     1.80     05-27    TPro  5.5<L>  /  Alb  3.0<L>  /  TBili  0.4  /  DBili  x   /  AST  176<H>  /  ALT  207<H>  /  AlkPhos  84  05-27    PT/INR - ( 26 May 2023 07:26 )   PT: 14.0 sec;   INR: 1.20 ratio                     RADIOLOGY & ADDITIONAL TESTS:  Results Reviewed:   Imaging Personally Reviewed:  Electrocardiogram Personally Reviewed:    COORDINATION OF CARE:  Care Discussed with Consultants/Other Providers [Y/N]:  Prior or Outpatient Records Reviewed [Y/N]:

## 2023-05-28 LAB
ALBUMIN SERPL ELPH-MCNC: 3.1 G/DL — LOW (ref 3.3–5)
ALP SERPL-CCNC: 77 U/L — SIGNIFICANT CHANGE UP (ref 40–120)
ALT FLD-CCNC: 171 U/L — HIGH (ref 4–33)
ANION GAP SERPL CALC-SCNC: 12 MMOL/L — SIGNIFICANT CHANGE UP (ref 7–14)
AST SERPL-CCNC: 131 U/L — HIGH (ref 4–32)
B PERT DNA SPEC QL NAA+PROBE: SIGNIFICANT CHANGE UP
B PERT+PARAPERT DNA PNL SPEC NAA+PROBE: SIGNIFICANT CHANGE UP
BILIRUB SERPL-MCNC: 0.5 MG/DL — SIGNIFICANT CHANGE UP (ref 0.2–1.2)
BORDETELLA PARAPERTUSSIS (RAPRVP): SIGNIFICANT CHANGE UP
BUN SERPL-MCNC: 7 MG/DL — SIGNIFICANT CHANGE UP (ref 7–23)
C PNEUM DNA SPEC QL NAA+PROBE: SIGNIFICANT CHANGE UP
CALCIUM SERPL-MCNC: 7.8 MG/DL — LOW (ref 8.4–10.5)
CHLORIDE SERPL-SCNC: 103 MMOL/L — SIGNIFICANT CHANGE UP (ref 98–107)
CO2 SERPL-SCNC: 20 MMOL/L — LOW (ref 22–31)
CREAT SERPL-MCNC: 0.57 MG/DL — SIGNIFICANT CHANGE UP (ref 0.5–1.3)
EGFR: 105 ML/MIN/1.73M2 — SIGNIFICANT CHANGE UP
FERRITIN SERPL-MCNC: 5853 NG/ML — HIGH (ref 15–150)
FLUAV SUBTYP SPEC NAA+PROBE: SIGNIFICANT CHANGE UP
FLUBV RNA SPEC QL NAA+PROBE: SIGNIFICANT CHANGE UP
GLUCOSE SERPL-MCNC: 102 MG/DL — HIGH (ref 70–99)
HADV DNA SPEC QL NAA+PROBE: SIGNIFICANT CHANGE UP
HCOV 229E RNA SPEC QL NAA+PROBE: SIGNIFICANT CHANGE UP
HCOV HKU1 RNA SPEC QL NAA+PROBE: SIGNIFICANT CHANGE UP
HCOV NL63 RNA SPEC QL NAA+PROBE: SIGNIFICANT CHANGE UP
HCOV OC43 RNA SPEC QL NAA+PROBE: SIGNIFICANT CHANGE UP
HCT VFR BLD CALC: 33.9 % — LOW (ref 34.5–45)
HGB BLD-MCNC: 11.4 G/DL — LOW (ref 11.5–15.5)
HMPV RNA SPEC QL NAA+PROBE: SIGNIFICANT CHANGE UP
HPIV1 RNA SPEC QL NAA+PROBE: SIGNIFICANT CHANGE UP
HPIV2 RNA SPEC QL NAA+PROBE: SIGNIFICANT CHANGE UP
HPIV3 RNA SPEC QL NAA+PROBE: SIGNIFICANT CHANGE UP
HPIV4 RNA SPEC QL NAA+PROBE: SIGNIFICANT CHANGE UP
IL2 SERPL-MCNC: 3898 PG/ML — HIGH (ref 175.3–858.2)
LACTATE SERPL-SCNC: 1.1 MMOL/L — SIGNIFICANT CHANGE UP (ref 0.5–2)
M PNEUMO DNA SPEC QL NAA+PROBE: SIGNIFICANT CHANGE UP
MAGNESIUM SERPL-MCNC: 1.9 MG/DL — SIGNIFICANT CHANGE UP (ref 1.6–2.6)
MCHC RBC-ENTMCNC: 31.4 PG — SIGNIFICANT CHANGE UP (ref 27–34)
MCHC RBC-ENTMCNC: 33.6 GM/DL — SIGNIFICANT CHANGE UP (ref 32–36)
MCV RBC AUTO: 93.4 FL — SIGNIFICANT CHANGE UP (ref 80–100)
NRBC # BLD: 0 /100 WBCS — SIGNIFICANT CHANGE UP (ref 0–0)
NRBC # FLD: 0 K/UL — SIGNIFICANT CHANGE UP (ref 0–0)
PHOSPHATE SERPL-MCNC: 3.3 MG/DL — SIGNIFICANT CHANGE UP (ref 2.5–4.5)
PLATELET # BLD AUTO: 194 K/UL — SIGNIFICANT CHANGE UP (ref 150–400)
POTASSIUM SERPL-MCNC: 3.5 MMOL/L — SIGNIFICANT CHANGE UP (ref 3.5–5.3)
POTASSIUM SERPL-SCNC: 3.5 MMOL/L — SIGNIFICANT CHANGE UP (ref 3.5–5.3)
PROT SERPL-MCNC: 5.5 G/DL — LOW (ref 6–8.3)
RAPID RVP RESULT: SIGNIFICANT CHANGE UP
RBC # BLD: 3.63 M/UL — LOW (ref 3.8–5.2)
RBC # FLD: 13.6 % — SIGNIFICANT CHANGE UP (ref 10.3–14.5)
RSV RNA SPEC QL NAA+PROBE: SIGNIFICANT CHANGE UP
RV+EV RNA SPEC QL NAA+PROBE: SIGNIFICANT CHANGE UP
SARS-COV-2 RNA SPEC QL NAA+PROBE: SIGNIFICANT CHANGE UP
SODIUM SERPL-SCNC: 135 MMOL/L — SIGNIFICANT CHANGE UP (ref 135–145)
WBC # BLD: 7.26 K/UL — SIGNIFICANT CHANGE UP (ref 3.8–10.5)
WBC # FLD AUTO: 7.26 K/UL — SIGNIFICANT CHANGE UP (ref 3.8–10.5)

## 2023-05-28 PROCEDURE — 99232 SBSQ HOSP IP/OBS MODERATE 35: CPT

## 2023-05-28 RX ORDER — DIPHENHYDRAMINE HYDROCHLORIDE AND LIDOCAINE HYDROCHLORIDE AND ALUMINUM HYDROXIDE AND MAGNESIUM HYDRO
10 KIT THREE TIMES A DAY
Refills: 0 | Status: DISCONTINUED | OUTPATIENT
Start: 2023-05-28 | End: 2023-06-08

## 2023-05-28 RX ADMIN — HEPARIN SODIUM 5000 UNIT(S): 5000 INJECTION INTRAVENOUS; SUBCUTANEOUS at 05:29

## 2023-05-28 RX ADMIN — Medication 75 MILLIGRAM(S): at 12:47

## 2023-05-28 RX ADMIN — Medication 650 MILLIGRAM(S): at 11:15

## 2023-05-28 RX ADMIN — Medication 650 MILLIGRAM(S): at 03:15

## 2023-05-28 RX ADMIN — ONDANSETRON 4 MILLIGRAM(S): 8 TABLET, FILM COATED ORAL at 02:46

## 2023-05-28 RX ADMIN — DIPHENHYDRAMINE HYDROCHLORIDE AND LIDOCAINE HYDROCHLORIDE AND ALUMINUM HYDROXIDE AND MAGNESIUM HYDRO 10 MILLILITER(S): KIT at 21:53

## 2023-05-28 RX ADMIN — Medication 650 MILLIGRAM(S): at 02:46

## 2023-05-28 RX ADMIN — HEPARIN SODIUM 5000 UNIT(S): 5000 INJECTION INTRAVENOUS; SUBCUTANEOUS at 12:48

## 2023-05-28 RX ADMIN — Medication 50 MILLIGRAM(S): at 12:48

## 2023-05-28 RX ADMIN — HEPARIN SODIUM 5000 UNIT(S): 5000 INJECTION INTRAVENOUS; SUBCUTANEOUS at 21:53

## 2023-05-28 RX ADMIN — Medication 650 MILLIGRAM(S): at 10:15

## 2023-05-28 NOTE — CHART NOTE - NSCHARTNOTEFT_GEN_A_CORE
Patient Age: 58  Patient Gender: F  Procedure (including site / side if known): liver biopsy  Diagnosis / Indication: transaminitis  Interventional Radiology Attending Physician: Dr Halaibeh   Ordering Attending Physician: Dr Summers  Pertinent medical history: transaminitis, MS, htn, sepsis, brian  Patient aware? Yes

## 2023-05-28 NOTE — PROGRESS NOTE ADULT - PROBLEM SELECTOR PLAN 2
Elevated Ferritin w/ bicytopenia, elevated LFT's, and persistent fevers. Ruling out HLH   -Ferritin 77500,   -triglyceride levels 205, Fibrinogen wnl   -f/up immunoglobulin levels   -s/p bone marrow biopsy 5/25, f/up results   -Heme input appreciated

## 2023-05-28 NOTE — PROGRESS NOTE ADULT - SUBJECTIVE AND OBJECTIVE BOX
PROGRESS NOTE:     Patient is a 58y old  Female who presents with a chief complaint of Fever, nausea, vomiting (27 May 2023 17:37)      SUBJECTIVE / OVERNIGHT EVENTS: Persistent fevers, now c/o joint pain.     ADDITIONAL REVIEW OF SYSTEMS:    MEDICATIONS  (STANDING):  amitriptyline 50 milliGRAM(s) Oral daily  heparin   Injectable 5000 Unit(s) SubCutaneous every 8 hours  heparin  Lock Flush 100 Units/mL Injectable 100 Unit(s) IV Push once  lidocaine 2% Injectable 20 milliLiter(s) Local Injection once  LORazepam     Tablet 0.25 milliGRAM(s) Oral once  potassium chloride    Tablet ER 40 milliEquivalent(s) Oral every 4 hours  pregabalin 75 milliGRAM(s) Oral daily    MEDICATIONS  (PRN):  acetaminophen     Tablet .. 650 milliGRAM(s) Oral every 6 hours PRN Temp greater or equal to 38C (100.4F)  ibuprofen  Tablet. 400 milliGRAM(s) Oral every 8 hours PRN Temp greater or equal to 38C (100.4F)  melatonin 3 milliGRAM(s) Oral at bedtime PRN Insomnia  ondansetron Injectable 4 milliGRAM(s) IV Push every 8 hours PRN Nausea and/or Vomiting      CAPILLARY BLOOD GLUCOSE        I&O's Summary      PHYSICAL EXAM:  Vital Signs Last 24 Hrs  T(C): 37 (28 May 2023 11:24), Max: 38.9 (27 May 2023 20:50)  T(F): 98.6 (28 May 2023 11:24), Max: 102.1 (27 May 2023 20:50)  HR: 107 (28 May 2023 11:24) (107 - 115)  BP: 105/58 (28 May 2023 11:24) (100/51 - 112/58)  BP(mean): --  RR: 19 (28 May 2023 11:24) (18 - 20)  SpO2: 97% (28 May 2023 11:24) (94% - 97%)    Parameters below as of 28 May 2023 11:24  Patient On (Oxygen Delivery Method): room air        CONSTITUTIONAL: NAD, well-developed  RESPIRATORY: Normal respiratory effort; lungs are clear to auscultation bilaterally  CARDIOVASCULAR: Regular rate and rhythm, normal S1 and S2, no murmur/rub/gallop; No lower extremity edema; Peripheral pulses are 2+ bilaterally  ABDOMEN: Nontender to palpation, normoactive bowel sounds, no rebound/guarding; No hepatosplenomegaly  MUSCLOSKELETAL: no clubbing or cyanosis of digits; no joint swelling or tenderness to palpation  PSYCH: A+O to person, place, and time; affect appropriate      LABS:                        11.4   7.26  )-----------( 194      ( 28 May 2023 06:09 )             33.9     05-28    135  |  103  |  7   ----------------------------<  102<H>  3.5   |  20<L>  |  0.57    Ca    7.8<L>      28 May 2023 06:09  Phos  3.3     05-28  Mg     1.90     05-28    TPro  5.5<L>  /  Alb  3.1<L>  /  TBili  0.5  /  DBili  x   /  AST  131<H>  /  ALT  171<H>  /  AlkPhos  77  05-28                RADIOLOGY & ADDITIONAL TESTS:  Results Reviewed:   Imaging Personally Reviewed:  Electrocardiogram Personally Reviewed:    COORDINATION OF CARE:  Care Discussed with Consultants/Other Providers [Y/N]:  Prior or Outpatient Records Reviewed [Y/N]:

## 2023-05-28 NOTE — CHART NOTE - NSCHARTNOTEFT_GEN_A_CORE
RN notified that patient's temp is 99.6 -->102.1. Patient seen at bedside. Patient complains of sorethroat. Denies chest pain, SOB, headache, nasal congestion or urinary symptoms.   General: no distress  ENT: Throat: noted with erythema.  Tongue: white RN notified that patient's temp is 99.6 -->102.1. Patient seen at bedside. Patient complains of sorethroat. Denies chest pain, SOB, headache, nasal congestion or urinary symptoms.   General: no distress  ENT: Throat: noted with erythema. RN notified that patient's temp is 99.6 -->102.1. Patient seen at bedside. Patient complains of sorethroat. Denies chest pain, SOB, headache, nasal congestion or urinary symptoms.   General: no distress  ENT: Throat: noted with erythema. Tongue: dry with white/yellow patch.   Lungs: CTA  abdomen: soft non tender  T(C): 37.1 (05-27-23 @ 23:00), Max: 38.9 (05-27-23 @ 20:50)  HR: 115 (05-27-23 @ 20:44) (107 - 115)  BP: 111/64 (05-27-23 @ 20:44) (111/64 - 118/62)  RR: 18 (05-27-23 @ 20:44) (18 - 18)  SpO2: 94% (05-27-23 @ 20:44) (94% - 95%)  Wt(kg): --  Fever:     -Blood cultures, UA, RVP, and strep ordered    -Tylenol given for fever    -consider thrush eval if strep PCR is neg    -Discussed with Dr. Browne.

## 2023-05-28 NOTE — PROGRESS NOTE ADULT - PROBLEM SELECTOR PLAN 1
Sepsis/SIRS present on admission, unclear etiology   Initially thought to be d/t gastroenteritis but diarrhea resolving and still remains febrile   -persistent fevers despite antibiotics   -now w/ joint pain, will need to consider Still's disease and ruling out HLH as below   -blood and urine cultures NEG to date  -repeat blood cultures NGTD   -GI PCR neg  -CXR w/ clear lungs   -RVP neg   -CT A/P unremarkable   -s/p empiric Ceftriaxone till 5/25   -obtain MRI brain to r/o PML   -ID input appreciated Sepsis/SIRS present on admission, unclear etiology   Initially thought to be d/t gastroenteritis but diarrhea resolving and still remains febrile   -persistent fevers despite antibiotics   -now w/ joint pain, will need to consider Still's disease and ruling out HLH as below   -blood and urine cultures NEG to date  -repeat blood cultures NGTD   -GI PCR neg  -CXR w/ clear lungs   -RVP neg   -CT A/P unremarkable   -s/p empiric Ceftriaxone till 5/25   -f/up CT chest   -obtain MRI brain to r/o PML   -will add ESR, CRP, RF, anti-citrullinated peptide antibody  -ID input appreciated

## 2023-05-29 LAB
ALBUMIN SERPL ELPH-MCNC: 3.3 G/DL — SIGNIFICANT CHANGE UP (ref 3.3–5)
ALP SERPL-CCNC: 74 U/L — SIGNIFICANT CHANGE UP (ref 40–120)
ALT FLD-CCNC: 138 U/L — HIGH (ref 4–33)
ANION GAP SERPL CALC-SCNC: 13 MMOL/L — SIGNIFICANT CHANGE UP (ref 7–14)
APPEARANCE UR: CLEAR — SIGNIFICANT CHANGE UP
AST SERPL-CCNC: 134 U/L — HIGH (ref 4–32)
BILIRUB SERPL-MCNC: 0.5 MG/DL — SIGNIFICANT CHANGE UP (ref 0.2–1.2)
BILIRUB UR-MCNC: NEGATIVE — SIGNIFICANT CHANGE UP
BUN SERPL-MCNC: 7 MG/DL — SIGNIFICANT CHANGE UP (ref 7–23)
CALCIUM SERPL-MCNC: 7.6 MG/DL — LOW (ref 8.4–10.5)
CHLORIDE SERPL-SCNC: 101 MMOL/L — SIGNIFICANT CHANGE UP (ref 98–107)
CO2 SERPL-SCNC: 20 MMOL/L — LOW (ref 22–31)
COLOR SPEC: YELLOW — SIGNIFICANT CHANGE UP
CREAT SERPL-MCNC: 0.6 MG/DL — SIGNIFICANT CHANGE UP (ref 0.5–1.3)
CRP SERPL-MCNC: 71.2 MG/L — HIGH
DIFF PNL FLD: ABNORMAL
EGFR: 104 ML/MIN/1.73M2 — SIGNIFICANT CHANGE UP
ERYTHROCYTE [SEDIMENTATION RATE] IN BLOOD: 44 MM/HR — HIGH (ref 4–25)
GLUCOSE SERPL-MCNC: 92 MG/DL — SIGNIFICANT CHANGE UP (ref 70–99)
GLUCOSE UR QL: NEGATIVE — SIGNIFICANT CHANGE UP
HCT VFR BLD CALC: 32 % — LOW (ref 34.5–45)
HGB BLD-MCNC: 10.7 G/DL — LOW (ref 11.5–15.5)
IGA FLD-MCNC: 209 MG/DL — SIGNIFICANT CHANGE UP (ref 84–499)
IGG FLD-MCNC: 666 MG/DL — SIGNIFICANT CHANGE UP (ref 610–1660)
IGM SERPL-MCNC: 69 MG/DL — SIGNIFICANT CHANGE UP (ref 35–242)
INR BLD: 1.24 RATIO — HIGH (ref 0.88–1.16)
KAPPA LC SER QL IFE: 2.42 MG/DL — HIGH (ref 0.33–1.94)
KAPPA/LAMBDA FREE LIGHT CHAIN RATIO, SERUM: 0.92 RATIO — SIGNIFICANT CHANGE UP (ref 0.26–1.65)
KETONES UR-MCNC: NEGATIVE — SIGNIFICANT CHANGE UP
LAMBDA LC SER QL IFE: 2.64 MG/DL — HIGH (ref 0.57–2.63)
LEUKOCYTE ESTERASE UR-ACNC: NEGATIVE — SIGNIFICANT CHANGE UP
MAGNESIUM SERPL-MCNC: 1.9 MG/DL — SIGNIFICANT CHANGE UP (ref 1.6–2.6)
MCHC RBC-ENTMCNC: 31 PG — SIGNIFICANT CHANGE UP (ref 27–34)
MCHC RBC-ENTMCNC: 33.4 GM/DL — SIGNIFICANT CHANGE UP (ref 32–36)
MCV RBC AUTO: 92.8 FL — SIGNIFICANT CHANGE UP (ref 80–100)
NITRITE UR-MCNC: NEGATIVE — SIGNIFICANT CHANGE UP
NRBC # BLD: 0 /100 WBCS — SIGNIFICANT CHANGE UP (ref 0–0)
NRBC # FLD: 0 K/UL — SIGNIFICANT CHANGE UP (ref 0–0)
PH UR: 6.5 — SIGNIFICANT CHANGE UP (ref 5–8)
PHOSPHATE SERPL-MCNC: 2.2 MG/DL — LOW (ref 2.5–4.5)
PLATELET # BLD AUTO: 218 K/UL — SIGNIFICANT CHANGE UP (ref 150–400)
POTASSIUM SERPL-MCNC: 3.5 MMOL/L — SIGNIFICANT CHANGE UP (ref 3.5–5.3)
POTASSIUM SERPL-SCNC: 3.5 MMOL/L — SIGNIFICANT CHANGE UP (ref 3.5–5.3)
PROT SERPL-MCNC: 5.6 G/DL — LOW (ref 6–8.3)
PROT UR-MCNC: ABNORMAL
PROTHROM AB SERPL-ACNC: 14.4 SEC — HIGH (ref 10.5–13.4)
RBC # BLD: 3.45 M/UL — LOW (ref 3.8–5.2)
RBC # FLD: 13.6 % — SIGNIFICANT CHANGE UP (ref 10.3–14.5)
RHEUMATOID FACT SERPL-ACNC: <10 IU/ML — SIGNIFICANT CHANGE UP (ref 0–13)
SODIUM SERPL-SCNC: 134 MMOL/L — LOW (ref 135–145)
SP GR SPEC: 1.05 — SIGNIFICANT CHANGE UP (ref 1.01–1.05)
UROBILINOGEN FLD QL: SIGNIFICANT CHANGE UP
WBC # BLD: 7.86 K/UL — SIGNIFICANT CHANGE UP (ref 3.8–10.5)
WBC # FLD AUTO: 7.86 K/UL — SIGNIFICANT CHANGE UP (ref 3.8–10.5)

## 2023-05-29 PROCEDURE — 71275 CT ANGIOGRAPHY CHEST: CPT | Mod: 26

## 2023-05-29 PROCEDURE — 99232 SBSQ HOSP IP/OBS MODERATE 35: CPT

## 2023-05-29 RX ADMIN — Medication 400 MILLIGRAM(S): at 06:50

## 2023-05-29 RX ADMIN — DIPHENHYDRAMINE HYDROCHLORIDE AND LIDOCAINE HYDROCHLORIDE AND ALUMINUM HYDROXIDE AND MAGNESIUM HYDRO 10 MILLILITER(S): KIT at 13:23

## 2023-05-29 RX ADMIN — HEPARIN SODIUM 5000 UNIT(S): 5000 INJECTION INTRAVENOUS; SUBCUTANEOUS at 12:19

## 2023-05-29 RX ADMIN — Medication 400 MILLIGRAM(S): at 05:58

## 2023-05-29 RX ADMIN — HEPARIN SODIUM 5000 UNIT(S): 5000 INJECTION INTRAVENOUS; SUBCUTANEOUS at 05:58

## 2023-05-29 RX ADMIN — Medication 50 MILLIGRAM(S): at 12:19

## 2023-05-29 RX ADMIN — HEPARIN SODIUM 5000 UNIT(S): 5000 INJECTION INTRAVENOUS; SUBCUTANEOUS at 21:09

## 2023-05-29 RX ADMIN — Medication 75 MILLIGRAM(S): at 12:18

## 2023-05-29 RX ADMIN — DIPHENHYDRAMINE HYDROCHLORIDE AND LIDOCAINE HYDROCHLORIDE AND ALUMINUM HYDROXIDE AND MAGNESIUM HYDRO 10 MILLILITER(S): KIT at 05:58

## 2023-05-29 RX ADMIN — DIPHENHYDRAMINE HYDROCHLORIDE AND LIDOCAINE HYDROCHLORIDE AND ALUMINUM HYDROXIDE AND MAGNESIUM HYDRO 10 MILLILITER(S): KIT at 21:08

## 2023-05-29 NOTE — PROGRESS NOTE ADULT - PROBLEM SELECTOR PLAN 4
-TTE w/ grossly normal left ventricular systolic function, normal RV   -d-dimer 947   -awaiting CTA chest   -monitor HR

## 2023-05-29 NOTE — PROVIDER CONTACT NOTE (OTHER) - BACKGROUND
admitted for fever   ST
admitted for fever due to unspecified condition
admitted for fever   hypotension
admitted for fever due to unspecified condition
59 yo female, pmhx HTN, MS. Admitted to the ED for fever due to unspecified condition
over night febrile  admitted for fever
admitted dx; fever due to unspecified condition
admitted for fever due to unspecified condition
patient admitted for fevers. Hx of MS
Pt admitted for fever due to unspecified condition.
patient admitted for septic workup, history of MS

## 2023-05-29 NOTE — PROGRESS NOTE ADULT - PROBLEM SELECTOR PLAN 2
Elevated Ferritin w/ bicytopenia, elevated LFT's, and persistent fevers. Ruling out HLH   -Ferritin 80724,   -Ferritin trending down w/o any intervention   -triglyceride levels 205, Fibrinogen wnl   -s/p bone marrow biopsy 5/25, f/up results   -Heme input appreciated

## 2023-05-29 NOTE — CHART NOTE - NSCHARTNOTEFT_GEN_A_CORE
Brief GI Note:     Patient with persistent fevers (101.6 at 2:39am on 5/28 and 100.4 at 10:10am on 5/28). Please cancel liver biopsy given fevers with unknown source.     Arlet Low MD, PGY4  GI/Hepatology Fellow

## 2023-05-29 NOTE — PROGRESS NOTE ADULT - SUBJECTIVE AND OBJECTIVE BOX
PROGRESS NOTE:     Patient is a 58y old  Female who presents with a chief complaint of Fever, nausea, vomiting (28 May 2023 12:54)      SUBJECTIVE / OVERNIGHT EVENTS: Still w/ persistent fevers and joint pain.     ADDITIONAL REVIEW OF SYSTEMS:    MEDICATIONS  (STANDING):  amitriptyline 50 milliGRAM(s) Oral daily  FIRST- Mouthwash  BLM 10 milliLiter(s) Swish and Spit three times a day  heparin   Injectable 5000 Unit(s) SubCutaneous every 8 hours  heparin  Lock Flush 100 Units/mL Injectable 100 Unit(s) IV Push once  lidocaine 2% Injectable 20 milliLiter(s) Local Injection once  LORazepam     Tablet 0.25 milliGRAM(s) Oral once  potassium chloride    Tablet ER 40 milliEquivalent(s) Oral every 4 hours  pregabalin 75 milliGRAM(s) Oral daily    MEDICATIONS  (PRN):  acetaminophen     Tablet .. 650 milliGRAM(s) Oral every 6 hours PRN Temp greater or equal to 38C (100.4F)  ibuprofen  Tablet. 400 milliGRAM(s) Oral every 8 hours PRN Temp greater or equal to 38C (100.4F)  melatonin 3 milliGRAM(s) Oral at bedtime PRN Insomnia  ondansetron Injectable 4 milliGRAM(s) IV Push every 8 hours PRN Nausea and/or Vomiting      CAPILLARY BLOOD GLUCOSE        I&O's Summary    28 May 2023 07:01  -  29 May 2023 07:00  --------------------------------------------------------  IN: 400 mL / OUT: 0 mL / NET: 400 mL        PHYSICAL EXAM:  Vital Signs Last 24 Hrs  T(C): 36.2 (29 May 2023 12:26), Max: 38.1 (29 May 2023 05:45)  T(F): 97.2 (29 May 2023 12:26), Max: 100.5 (29 May 2023 05:45)  HR: 75 (29 May 2023 12:26) (75 - 80)  BP: 92/52 (29 May 2023 12:26) (92/52 - 106/60)  BP(mean): --  RR: 16 (29 May 2023 12:26) (16 - 18)  SpO2: 97% (29 May 2023 12:26) (97% - 99%)    Parameters below as of 29 May 2023 12:26  Patient On (Oxygen Delivery Method): room air      CONSTITUTIONAL: NAD, well-developed  RESPIRATORY: Normal respiratory effort; lungs are clear to auscultation bilaterally  CARDIOVASCULAR: Regular rate and rhythm, normal S1 and S2, no murmur/rub/gallop; No lower extremity edema; Peripheral pulses are 2+ bilaterally  ABDOMEN: Nontender to palpation, normoactive bowel sounds, no rebound/guarding; No hepatosplenomegaly  MUSCLOSKELETAL: no clubbing or cyanosis of digits; no joint swelling or tenderness to palpation  PSYCH: A+O to person, place, and time; affect appropriateappropriate    LABS:                        10.7   7.86  )-----------( 218      ( 29 May 2023 06:05 )             32.0     05-29    134<L>  |  101  |  7   ----------------------------<  92  3.5   |  20<L>  |  0.60    Ca    7.6<L>      29 May 2023 06:05  Phos  2.2     05-29  Mg     1.90     05-29    TPro  5.6<L>  /  Alb  3.3  /  TBili  0.5  /  DBili  x   /  AST  134<H>  /  ALT  138<H>  /  AlkPhos  74  05-29    PT/INR - ( 29 May 2023 06:05 )   PT: 14.4 sec;   INR: 1.24 ratio                   Culture - Blood (collected 28 May 2023 01:55)  Source: .Blood Blood-Venous  Preliminary Report (29 May 2023 11:01):    No growth to date.    Culture - Blood (collected 27 May 2023 23:36)  Source: .Blood Blood-Peripheral  Preliminary Report (29 May 2023 03:01):    No growth to date.        RADIOLOGY & ADDITIONAL TESTS:  Results Reviewed:   Imaging Personally Reviewed:  Electrocardiogram Personally Reviewed:    COORDINATION OF CARE:  Care Discussed with Consultants/Other Providers [Y/N]:  Prior or Outpatient Records Reviewed [Y/N]:

## 2023-05-29 NOTE — PROGRESS NOTE ADULT - PROBLEM SELECTOR PLAN 3
Worsening chronic transaminitis  Reports elevated liver enzymes in "200s" since February, which were investigated by her gastroenterologist   -APAP level neg.  -Abd US showing fatty liver  -EBV Ag was positive and likely old infection, EBV PCR negative   -f/up alpha-1 anti-trypsin (phenotype) 212, AFP 9.4   -MAYELA neg, anti-smooth muscle antibody titer high   -IR consulted for liver biopsy, will hold off for now d/t persistent fevers   -Appreciate hepatology recs

## 2023-05-29 NOTE — PROGRESS NOTE ADULT - PROBLEM SELECTOR PLAN 1
Sepsis/SIRS present on admission, unclear etiology   Initially thought to be d/t gastroenteritis but diarrhea resolved and still remains febrile   -persistent fevers despite antibiotics   -now w/ joint pain 5/28, will need to consider Still's disease and ruling out HLH as well   -blood and urine cultures NEG to date, repeat blood cultures NGTD   -GI PCR neg  -CXR w/ clear lungs   -RVP neg   -CT A/P unremarkable   -f/up CT chest   -obtain MRI brain to r/o PML   -ESR/CRP high, f/up RF and anti-citrullinated peptide antibody  -ID following

## 2023-05-30 LAB
A1AT PHENOTYP SERPL-IMP: SIGNIFICANT CHANGE UP
A1AT SERPL-MCNC: 192 MG/DL — HIGH (ref 101–187)
ALBUMIN SERPL ELPH-MCNC: 2.9 G/DL — LOW (ref 3.3–5)
ALP SERPL-CCNC: 84 U/L — SIGNIFICANT CHANGE UP (ref 40–120)
ALT FLD-CCNC: 149 U/L — HIGH (ref 4–33)
ANION GAP SERPL CALC-SCNC: 11 MMOL/L — SIGNIFICANT CHANGE UP (ref 7–14)
AST SERPL-CCNC: 185 U/L — HIGH (ref 4–32)
BCR/ABL BY RT - PCR QUANTITATIVE: SIGNIFICANT CHANGE UP
BILIRUB SERPL-MCNC: 0.6 MG/DL — SIGNIFICANT CHANGE UP (ref 0.2–1.2)
BUN SERPL-MCNC: 7 MG/DL — SIGNIFICANT CHANGE UP (ref 7–23)
CALCIUM SERPL-MCNC: 7.6 MG/DL — LOW (ref 8.4–10.5)
CCP IGG SERPL-ACNC: <8 UNITS — SIGNIFICANT CHANGE UP
CHLORIDE SERPL-SCNC: 103 MMOL/L — SIGNIFICANT CHANGE UP (ref 98–107)
CHROM ANALY INTERPHASE BLD FISH-IMP: SIGNIFICANT CHANGE UP
CO2 SERPL-SCNC: 21 MMOL/L — LOW (ref 22–31)
CREAT SERPL-MCNC: 0.57 MG/DL — SIGNIFICANT CHANGE UP (ref 0.5–1.3)
DNA PLOIDY SPEC FC-IMP: SIGNIFICANT CHANGE UP
EGFR: 105 ML/MIN/1.73M2 — SIGNIFICANT CHANGE UP
GLUCOSE SERPL-MCNC: 87 MG/DL — SIGNIFICANT CHANGE UP (ref 70–99)
HCT VFR BLD CALC: 31.3 % — LOW (ref 34.5–45)
HGB BLD-MCNC: 10.3 G/DL — LOW (ref 11.5–15.5)
INR BLD: 1.24 RATIO — HIGH (ref 0.88–1.16)
MAGNESIUM SERPL-MCNC: 2.2 MG/DL — SIGNIFICANT CHANGE UP (ref 1.6–2.6)
MCHC RBC-ENTMCNC: 31.1 PG — SIGNIFICANT CHANGE UP (ref 27–34)
MCHC RBC-ENTMCNC: 32.9 GM/DL — SIGNIFICANT CHANGE UP (ref 32–36)
MCV RBC AUTO: 94.6 FL — SIGNIFICANT CHANGE UP (ref 80–100)
NRBC # BLD: 0 /100 WBCS — SIGNIFICANT CHANGE UP (ref 0–0)
NRBC # FLD: 0 K/UL — SIGNIFICANT CHANGE UP (ref 0–0)
PHOSPHATE SERPL-MCNC: 2.6 MG/DL — SIGNIFICANT CHANGE UP (ref 2.5–4.5)
PLATELET # BLD AUTO: 258 K/UL — SIGNIFICANT CHANGE UP (ref 150–400)
POTASSIUM SERPL-MCNC: 3.5 MMOL/L — SIGNIFICANT CHANGE UP (ref 3.5–5.3)
POTASSIUM SERPL-SCNC: 3.5 MMOL/L — SIGNIFICANT CHANGE UP (ref 3.5–5.3)
PROT SERPL-MCNC: 5.6 G/DL — LOW (ref 6–8.3)
PROTHROM AB SERPL-ACNC: 14.4 SEC — HIGH (ref 10.5–13.4)
RBC # BLD: 3.31 M/UL — LOW (ref 3.8–5.2)
RBC # FLD: 13.9 % — SIGNIFICANT CHANGE UP (ref 10.3–14.5)
RF+CCP IGG SER-IMP: NEGATIVE — SIGNIFICANT CHANGE UP
SODIUM SERPL-SCNC: 135 MMOL/L — SIGNIFICANT CHANGE UP (ref 135–145)
WBC # BLD: 8.87 K/UL — SIGNIFICANT CHANGE UP (ref 3.8–10.5)
WBC # FLD AUTO: 8.87 K/UL — SIGNIFICANT CHANGE UP (ref 3.8–10.5)

## 2023-05-30 PROCEDURE — 99232 SBSQ HOSP IP/OBS MODERATE 35: CPT | Mod: GC

## 2023-05-30 PROCEDURE — 99232 SBSQ HOSP IP/OBS MODERATE 35: CPT

## 2023-05-30 RX ORDER — SODIUM CHLORIDE 9 MG/ML
1000 INJECTION, SOLUTION INTRAVENOUS
Refills: 0 | Status: DISCONTINUED | OUTPATIENT
Start: 2023-05-30 | End: 2023-06-03

## 2023-05-30 RX ADMIN — DIPHENHYDRAMINE HYDROCHLORIDE AND LIDOCAINE HYDROCHLORIDE AND ALUMINUM HYDROXIDE AND MAGNESIUM HYDRO 10 MILLILITER(S): KIT at 22:46

## 2023-05-30 RX ADMIN — Medication 75 MILLIGRAM(S): at 12:14

## 2023-05-30 RX ADMIN — HEPARIN SODIUM 5000 UNIT(S): 5000 INJECTION INTRAVENOUS; SUBCUTANEOUS at 05:23

## 2023-05-30 RX ADMIN — DIPHENHYDRAMINE HYDROCHLORIDE AND LIDOCAINE HYDROCHLORIDE AND ALUMINUM HYDROXIDE AND MAGNESIUM HYDRO 10 MILLILITER(S): KIT at 12:16

## 2023-05-30 RX ADMIN — HEPARIN SODIUM 5000 UNIT(S): 5000 INJECTION INTRAVENOUS; SUBCUTANEOUS at 12:15

## 2023-05-30 RX ADMIN — SODIUM CHLORIDE 50 MILLILITER(S): 9 INJECTION, SOLUTION INTRAVENOUS at 15:21

## 2023-05-30 RX ADMIN — HEPARIN SODIUM 5000 UNIT(S): 5000 INJECTION INTRAVENOUS; SUBCUTANEOUS at 22:46

## 2023-05-30 RX ADMIN — DIPHENHYDRAMINE HYDROCHLORIDE AND LIDOCAINE HYDROCHLORIDE AND ALUMINUM HYDROXIDE AND MAGNESIUM HYDRO 10 MILLILITER(S): KIT at 05:23

## 2023-05-30 RX ADMIN — Medication 50 MILLIGRAM(S): at 12:15

## 2023-05-30 NOTE — PROGRESS NOTE ADULT - ASSESSMENT
57yo Female with MHx of HTN, MS a/w SIRS of unclear etiology, c/b hypotension and SUSAN; Found to have transaminitis

## 2023-05-30 NOTE — DIETITIAN INITIAL EVALUATION ADULT - PERTINENT MEDS FT
MEDICATIONS  (STANDING):  amitriptyline 50 milliGRAM(s) Oral daily  FIRST- Mouthwash  BLM 10 milliLiter(s) Swish and Spit three times a day  heparin   Injectable 5000 Unit(s) SubCutaneous every 8 hours  heparin  Lock Flush 100 Units/mL Injectable 100 Unit(s) IV Push once  lidocaine 2% Injectable 20 milliLiter(s) Local Injection once  LORazepam     Tablet 0.25 milliGRAM(s) Oral once  potassium chloride    Tablet ER 40 milliEquivalent(s) Oral every 4 hours  pregabalin 75 milliGRAM(s) Oral daily    MEDICATIONS  (PRN):  acetaminophen     Tablet .. 650 milliGRAM(s) Oral every 6 hours PRN Temp greater or equal to 38C (100.4F)  ibuprofen  Tablet. 400 milliGRAM(s) Oral every 8 hours PRN Temp greater or equal to 38C (100.4F)  melatonin 3 milliGRAM(s) Oral at bedtime PRN Insomnia  ondansetron Injectable 4 milliGRAM(s) IV Push every 8 hours PRN Nausea and/or Vomiting

## 2023-05-30 NOTE — DIETITIAN INITIAL EVALUATION ADULT - PROBLEM SELECTOR PLAN 7
Relatively hypotensive given that on triple agent regimen; Initial SBP in 80s mmHg; s/p Midodrine in ED  -Hold all  antihypertensives (takes [place] Triplixam 10/2.5/10)   -BP check q4h  -Low threshold for MICU c/s

## 2023-05-30 NOTE — DIETITIAN INITIAL EVALUATION ADULT - PERTINENT LABORATORY DATA
05-30    135  |  103  |  7   ----------------------------<  87  3.5   |  21<L>  |  0.57    Ca    7.6<L>      30 May 2023 07:23  Phos  2.6     05-30  Mg     2.20     05-30    TPro  5.6<L>  /  Alb  2.9<L>  /  TBili  0.6  /  DBili  x   /  AST  185<H>  /  ALT  149<H>  /  AlkPhos  84  05-30  A1C with Estimated Average Glucose Result: 7.4 % (05-20-23 @ 07:20)

## 2023-05-30 NOTE — PROGRESS NOTE ADULT - ATTENDING COMMENTS
57 yo Female with PMH of HTN, MS (on Ocrevus every 6 months, last 2/2023) c/o 3 days of generalized weakness, fever and chills with associated vomiting. She developed fevers and chills one months ago which resolved after several weeks and then recurred several days ago at which point she sought medical attention. The fevers have been as high as 103. On evaluation she has bicytopenia with wbc 2.3 and plts 118 and normal hgb. She has had known elevated liver enzymes in "200s" since February, which were investigated by her gastroenterologist (unclear etiology). She now has AST//247, normal bilirubin, , ferritin 23377 and anti SMA +1:20. CT A/P: No acute intra-abdominal process  CMV PCR neg Hepatitis panel neg COVID neg. Given fever, bicytopenia and markedly elevated ferritin will pursue evaluation for possible HLH.  Peripheral smear with RBC normal in size and number, decreased WBC with normal morphology, PLTs appears normal.   Additional work up reveals TG <265, fibrinogen is not decreased, no splenomegaly on CT A/P. Elevated ferritin has improved from 12,000 to 9,000 to 5,853. Blood counts are also improving. BM BX with no definitive hemophagocytosis pending further staining for final report. Without additional findings to confirm HLH and with ferritin decreasing spontaneously, suspicion is decreased. Hematology will continue to follow with you.

## 2023-05-30 NOTE — PROGRESS NOTE ADULT - PROBLEM SELECTOR PLAN 2
Elevated Ferritin w/ bicytopenia, elevated LFT's, and persistent fevers. Ruling out HLH   -Ferritin 73637,   -Ferritin trending down w/o any intervention   -triglyceride levels 205, Fibrinogen wnl   -s/p bone marrow biopsy 5/25, f/up results   -Heme input appreciated Yes...

## 2023-05-30 NOTE — PROGRESS NOTE ADULT - ASSESSMENT
59 y/o F PMHx HTN and MS (on ocrelizumab), presented with generalized weakness, fever/chills, and N/V. Persistent fever during admission with labs significant for transaminitis, bicytopenia, and markedly elevated ferritin. S/p BM biopsy for HLH workup.     Last fever 101.6 (5/23)  Ferritin 12k  Blood Cultures no growth  CMV,EBV,HSV PCR neg    Impression:  #Fever  #Transaminitis  #Elevated Ferritin  #Weakness, Unsteady Gait    Findings suggestive of non-infectious etiology, ?HLH vs Still's   Heme eval in progress  She had blanching rash on Thursday c/o sore throat over the weekend. She has had fever, transaminitis, elevated ferritin.  She notes joint pain (but more chronic than acute).  No leukocytosis- was leukopenic/    Infectious work up unrevealing, EBV serologies suggestive of past infection unrelated to acute presentation  I suspect a non-infectious etiology  Recs:  - monitor off antibiotics  - follow up BM biopsy results  - monitor CBC  - monitor temp  - pending MRI Brain  - consider further work up for still's

## 2023-05-30 NOTE — PROGRESS NOTE ADULT - SUBJECTIVE AND OBJECTIVE BOX
Hematology Oncology Follow-up    INTERVAL HPI/OVERNIGHT EVENTS:  No o/n events.     Patient still spikes fevers, last fever yesterday 100.5.     VITAL SIGNS:  T(F): 98.9 (23 @ 05:20)  HR: 95 (23 @ 05:20)  BP: 108/53 (23 @ 05:20)  RR: 18 (23 @ 05:20)  SpO2: 95% (23 @ 05:20)  Wt(kg): --    23 @ 07:01  -  23 @ 07:00  --------------------------------------------------------  IN: 600 mL / OUT: 0 mL / NET: 600 mL    PHYSICAL EXAM:  Constitutional: NAD  Respiratory: CTA b/l, symmetric chest rise, with normal respiratory effort  Cardiovascular: RRR  Gastrointestinal: soft, NTND  Extremities:  no edema  MSK: no obvious abnormalities  Neurological: Grossly intact  Skin: no rash, no echymoses, no petichiae  Psych: normal affect    MEDICATIONS  (STANDING):  amitriptyline 50 milliGRAM(s) Oral daily  FIRST- Mouthwash  BLM 10 milliLiter(s) Swish and Spit three times a day  heparin   Injectable 5000 Unit(s) SubCutaneous every 8 hours  heparin  Lock Flush 100 Units/mL Injectable 100 Unit(s) IV Push once  lidocaine 2% Injectable 20 milliLiter(s) Local Injection once  LORazepam     Tablet 0.25 milliGRAM(s) Oral once  potassium chloride    Tablet ER 40 milliEquivalent(s) Oral every 4 hours  pregabalin 75 milliGRAM(s) Oral daily    MEDICATIONS  (PRN):  acetaminophen     Tablet .. 650 milliGRAM(s) Oral every 6 hours PRN Temp greater or equal to 38C (100.4F)  ibuprofen  Tablet. 400 milliGRAM(s) Oral every 8 hours PRN Temp greater or equal to 38C (100.4F)  melatonin 3 milliGRAM(s) Oral at bedtime PRN Insomnia  ondansetron Injectable 4 milliGRAM(s) IV Push every 8 hours PRN Nausea and/or Vomiting      No Known Allergies      LABS:                        10.3   8.87  )-----------( 258      ( 30 May 2023 07:23 )             31.3         134<L>  |  101  |  7   ----------------------------<  92  3.5   |  20<L>  |  0.60    Ca    7.6<L>      29 May 2023 06:05  Phos  2.2       Mg     1.90         TPro  5.6<L>  /  Alb  3.3  /  TBili  0.5  /  DBili  x   /  AST  134<H>  /  ALT  138<H>  /  AlkPhos  74      PT/INR - ( 30 May 2023 07:23 )   PT: 14.4 sec;   INR: 1.24 ratio            Urinalysis Basic - ( 29 May 2023 21:28 )    Color: Yellow / Appearance: Clear / S.048 / pH: x  Gluc: x / Ketone: Negative  / Bili: Negative / Urobili: <2 mg/dL   Blood: x / Protein: 30 mg/dL / Nitrite: Negative   Leuk Esterase: Negative / RBC: 10 /HPF / WBC 6 /HPF   Sq Epi: x / Non Sq Epi: x / Bacteria: Negative        Ferritin, Serum: 5853 ng/mL *H* (23 @ 06:09)        Bilirubin: Negative (23 @ 21:28)      RADIOLOGY & ADDITIONAL TESTS:  Studies reviewed.

## 2023-05-30 NOTE — PROGRESS NOTE ADULT - PROBLEM SELECTOR PLAN 4
-TTE w/ grossly normal left ventricular systolic function, normal RV   -d-dimer 947   - CTA chest with no PE  -monitor HR

## 2023-05-30 NOTE — DIETITIAN INITIAL EVALUATION ADULT - REASON FOR ADMISSION
Fever due to unspecified condition  59yo Female with MHx of HTN, MS a/w SIRS of unclear etiology, c/b hypotension and SUSAN; Found to have transaminitis

## 2023-05-30 NOTE — PROGRESS NOTE ADULT - ATTENDING COMMENTS
Patient with chronic liver test elevation ddx of DILI vs AIH vs infection given chronic fevers  Afebrile at this time  Liver tests improved from admission  Given chronicity would obtain liver biopsy if patient continues to remain afebrile and if lvier tests do not normalize - especially since patients serologic workup has been negative thus far

## 2023-05-30 NOTE — PROGRESS NOTE ADULT - SUBJECTIVE AND OBJECTIVE BOX
Follow Up:      Inverval History/ROS:Patient is a 58y old  Female who presents with a chief complaint of Fever, nausea, vomiting (30 May 2023 12:50)    Afebrile      Allergies    No Known Allergies    Intolerances        ANTIMICROBIALS:      OTHER MEDS:  acetaminophen     Tablet .. 650 milliGRAM(s) Oral every 6 hours PRN  amitriptyline 50 milliGRAM(s) Oral daily  FIRST- Mouthwash  BLM 10 milliLiter(s) Swish and Spit three times a day  heparin   Injectable 5000 Unit(s) SubCutaneous every 8 hours  heparin  Lock Flush 100 Units/mL Injectable 100 Unit(s) IV Push once  ibuprofen  Tablet. 400 milliGRAM(s) Oral every 8 hours PRN  lactated ringers. 1000 milliLiter(s) IV Continuous <Continuous>  lidocaine 2% Injectable 20 milliLiter(s) Local Injection once  LORazepam     Tablet 0.25 milliGRAM(s) Oral once  melatonin 3 milliGRAM(s) Oral at bedtime PRN  ondansetron Injectable 4 milliGRAM(s) IV Push every 8 hours PRN  potassium chloride    Tablet ER 40 milliEquivalent(s) Oral every 4 hours  pregabalin 75 milliGRAM(s) Oral daily      Vital Signs Last 24 Hrs  T(C): 37 (30 May 2023 12:42), Max: 37.2 (30 May 2023 05:20)  T(F): 98.6 (30 May 2023 12:42), Max: 98.9 (30 May 2023 05:20)  HR: 100 (30 May 2023 12:42) (95 - 100)  BP: 102/50 (30 May 2023 12:42) (102/50 - 110/60)  BP(mean): --  RR: 17 (30 May 2023 12:42) (17 - 18)  SpO2: 99% (30 May 2023 12:42) (95% - 99%)    Parameters below as of 30 May 2023 12:42  Patient On (Oxygen Delivery Method): room air        PHYSICAL EXAM:  General: [x ] non-toxic  HEAD/EYES: [ ] PERRL [x ] white sclera [ ] icterus  ENT:  [ ] normal [ x] supple [ ] thrush [ ] pharyngeal exudate  Cardiovascular:   [ ] murmur [x ] normal [ ] PPM/AICD  Respiratory:  x[ ] clear to ausculation bilaterally  GI:  [ x] soft, non-tender, normal bowel sounds  :  [ ] chadwick [ ] no CVA tenderness   Musculoskeletal:  [x ] no synovitis  Neurologic:  [ ] non-focal exam   Skin:  x[ ] no rash  Lymph: [x ] no lymphadenopathy  Psychiatric:  [ ] appropriate affect [ ] alert & oriented  Lines:  x[ ] no phlebitis [ ] central line                                10.3   8.87  )-----------( 258      ( 30 May 2023 07:23 )             31.3           135  |  103  |  7   ----------------------------<  87  3.5   |  21<L>  |  0.57    Ca    7.6<L>      30 May 2023 07:23  Phos  2.6       Mg     2.20         TPro  5.6<L>  /  Alb  2.9<L>  /  TBili  0.6  /  DBili  x   /  AST  185<H>  /  ALT  149<H>  /  AlkPhos  84        Urinalysis Basic - ( 29 May 2023 21:28 )    Color: Yellow / Appearance: Clear / S.048 / pH: x  Gluc: x / Ketone: Negative  / Bili: Negative / Urobili: <2 mg/dL   Blood: x / Protein: 30 mg/dL / Nitrite: Negative   Leuk Esterase: Negative / RBC: 10 /HPF / WBC 6 /HPF   Sq Epi: x / Non Sq Epi: x / Bacteria: Negative        MICROBIOLOGY:Culture Results:   No growth to date. (23 @ 01:55)  Culture Results:   No growth to date. (23 @ 23:36)      RADIOLOGY:

## 2023-05-30 NOTE — PROGRESS NOTE ADULT - ASSESSMENT
Impression:     #Elevated liver enzymes  Reported chronic elevation of AST/ALT in 200s since last year, attributed to genetic condition vs. Ocrevus infusion? Previously followed with GI in [place], no hepatology. Mother  of liver cirrhosis, non alcoholic, unknown etiology. Patient denies severe alcohol use, no new meds or herbal supplements.   - Differentials include superimposed ischemic liver injury in a chronic liver disease due to hypotension on admission. Unknown chronic liver disease at this time as she has been having elevated liver enzymes in the past. Never had prior liver biopsy. Ocrevus infusion known to cause diarrhea and colitis, however unlikely to cause liver toxicity. Med induced vs. autoimmune vs. infiltrative disease, possible superimposed ischemic liver injury with underlying NAFLD  - EBV Ag is positive. EBV PCR neg.   - Other serologies - Hep B/C/A neg, CMV neg, HSV 1/2 neg. MAYELA neg, ASMA 1:20. Hep E IgM neg. AFP 9.4, alpha-1antytripsin 212, ceruloplasmin normal.   - Liver enzymes trending down.   - US abd showed hepatic steatosis. CT A/P reported liver wnl.      Recommendations:   - Would hold off on liver biopsy as the patient has persistent fevers.   - alpha-1 anti-trypsin (phenotype), ceruloplasm, AFP  - can advance diet as tolerated.  - Liver enzymes have been trending down, recommend o/p follow in hepatology clinic within 1-2 weeks after hospital discharge.     GI will plan to sign off at this time. Please feel free to reach out to our team with any follow up questions. Please provide patient with Gastroenterology Clinic number to confirm/arrange appointment; [phone] ([facility] at [facility]) or [phone] ([facility] at [address] [place] [address]) or [phone] ([facility] at [address] [place] [address]) or hepatology clinic [phone]      All recommendations are tentative until the note is attested by an attending.     Milton Griffith, PGY-4  Gastroenterology/Hepatology Fellow  Available on [employer] Teams  [phone] (Short Range Pager)  [phone] (Long Range Pager)    After 5pm, please contact the on-call GI fellow. [phone]           Impression:     #Elevated liver enzymes  Reported chronic elevation of AST/ALT in 200s since last year, attributed to genetic condition vs. Ocrevus infusion? Previously followed with GI in [place], no hepatology. Mother  of liver cirrhosis, non alcoholic, unknown etiology. Patient denies severe alcohol use, no new meds or herbal supplements.   - Differentials include superimposed ischemic liver injury in a chronic liver disease due to hypotension on admission. Unknown chronic liver disease at this time as she has been having elevated liver enzymes in the past. Never had prior liver biopsy. Ocrevus infusion known to cause diarrhea and colitis, however unlikely to cause liver toxicity. Med induced vs. autoimmune vs. infiltrative disease, possible superimposed ischemic liver injury with underlying NAFLD  - EBV Ag is positive. EBV PCR neg.   - Other serologies - Hep B/C/A neg, CMV neg, HSV 1/2 neg. MAYELA neg, ASMA 1:20. Hep E IgM neg. AFP 9.4, alpha-1antytripsin 212, ceruloplasmin normal.   - Liver enzymes trending down.   - US abd showed hepatic steatosis. CT A/P reported liver wnl.      Recommendations:   - Would hold off on liver biopsy as the patient has persistent fevers.   - Please obtain hep B core ab IgG and HBV PCR.   - alpha-1 anti-trypsin (phenotype), ceruloplasm, AFP  - can advance diet as tolerated.  - Once the fevers resolve, can consider o/p liver biopsy.   - Liver enzymes have been trending down, recommend o/p follow in hepatology clinic within 1-2 weeks after hospital discharge.     All recommendations are tentative until the note is attested by an attending.     Milton Griffith, PGY-4  Gastroenterology/Hepatology Fellow  Available on [employer] Teams  [phone] (Short Range Pager)  [phone] (Long Range Pager)    After 5pm, please contact the on-call GI fellow. [phone]

## 2023-05-30 NOTE — PROGRESS NOTE ADULT - SUBJECTIVE AND OBJECTIVE BOX
Patient is a 58y old  Female who presents with a chief complaint of Fever due to unspecified condition  57yo Female with MHx of HTN, MS a/w SIRS of unclear etiology, c/b hypotension and SUSAN; Found to have transaminitis       (30 May 2023 12:06)      SUBJECTIVE / OVERNIGHT EVENTS: Pt seen and examined at 11am, no overnight events, reports poor appetite, feels tired, is aware that she is waiting for MRI, no other new issues reported.    MEDICATIONS  (STANDING):  amitriptyline 50 milliGRAM(s) Oral daily  FIRST- Mouthwash  BLM 10 milliLiter(s) Swish and Spit three times a day  heparin   Injectable 5000 Unit(s) SubCutaneous every 8 hours  heparin  Lock Flush 100 Units/mL Injectable 100 Unit(s) IV Push once  lidocaine 2% Injectable 20 milliLiter(s) Local Injection once  LORazepam     Tablet 0.25 milliGRAM(s) Oral once  potassium chloride    Tablet ER 40 milliEquivalent(s) Oral every 4 hours  pregabalin 75 milliGRAM(s) Oral daily    MEDICATIONS  (PRN):  acetaminophen     Tablet .. 650 milliGRAM(s) Oral every 6 hours PRN Temp greater or equal to 38C (100.4F)  ibuprofen  Tablet. 400 milliGRAM(s) Oral every 8 hours PRN Temp greater or equal to 38C (100.4F)  melatonin 3 milliGRAM(s) Oral at bedtime PRN Insomnia  ondansetron Injectable 4 milliGRAM(s) IV Push every 8 hours PRN Nausea and/or Vomiting      Vital Signs Last 24 Hrs  T(C): 37 (30 May 2023 12:42), Max: 37.2 (30 May 2023 05:20)  T(F): 98.6 (30 May 2023 12:42), Max: 98.9 (30 May 2023 05:20)  HR: 100 (30 May 2023 12:42) (95 - 100)  BP: 102/50 (30 May 2023 12:42) (102/50 - 110/60)  BP(mean): --  RR: 17 (30 May 2023 12:42) (17 - 18)  SpO2: 99% (30 May 2023 12:42) (95% - 99%)    Parameters below as of 30 May 2023 12:42  Patient On (Oxygen Delivery Method): room air      CAPILLARY BLOOD GLUCOSE        I&O's Summary    29 May 2023 07:01  -  30 May 2023 07:00  --------------------------------------------------------  IN: 600 mL / OUT: 0 mL / NET: 600 mL        PHYSICAL EXAM:  GENERAL: NAD, well-developed  CHEST/LUNG: Clear to auscultation bilaterally; No wheeze  HEART: Regular rate and rhythm  ABDOMEN: Soft, Nontender, Nondistended  EXTREMITIES: no LE edema  PSYCH: Calm  NEUROLOGY: AAOx3  SKIN: No rashes or lesions    LABS:                        10.3   8.87  )-----------( 258      ( 30 May 2023 07:23 )             31.3     05-30    135  |  103  |  7   ----------------------------<  87  3.5   |  21<L>  |  0.57    Ca    7.6<L>      30 May 2023 07:23  Phos  2.6     05-30  Mg     2.20     05-30    TPro  5.6<L>  /  Alb  2.9<L>  /  TBili  0.6  /  DBili  x   /  AST  185<H>  /  ALT  149<H>  /  AlkPhos  84  05-30    PT/INR - ( 30 May 2023 07:23 )   PT: 14.4 sec;   INR: 1.24 ratio               Urinalysis Basic - ( 29 May 2023 21:28 )    Color: Yellow / Appearance: Clear / S.048 / pH: x  Gluc: x / Ketone: Negative  / Bili: Negative / Urobili: <2 mg/dL   Blood: x / Protein: 30 mg/dL / Nitrite: Negative   Leuk Esterase: Negative / RBC: 10 /HPF / WBC 6 /HPF   Sq Epi: x / Non Sq Epi: x / Bacteria: Negative        RADIOLOGY & ADDITIONAL TESTS:  < from: CT Angio Chest PE Protocol w/ IV Cont (23 @ 13:12) >  CT ANGIO CHEST PULM    < end of copied text >  < from: CT Angio Chest PE Protocol w/ IV Cont (23 @ 13:12) >  Limited evaluation for detection of pulmonary embolism due to suboptimal   contrast opacification of the pulmonary arteries.    No saddle pulmonary embolism or pulmonary embolism within the truncus   anterior and interlobar pulmonary arteries. More peripheral branches are   not adequately assessed.    Right basilar 1.1 cm subpleural nodular opacity is indeterminate, but may   represent subsegmental atelectasis. Recommend CT chest follow-up in 3   months to assess for stability/clearing.    < end of copied text >    Imaging Personally Reviewed:    Consultant(s) Notes Reviewed:      Care Discussed with Consultants/Other Providers:

## 2023-05-30 NOTE — DIETITIAN INITIAL EVALUATION ADULT - OTHER INFO
A&Ox4. Pt reports poor appetite during hospitalization (eating only a bite of her meals). diarrhea resolved. No nausea/vomiting. Last BM yesterday. Denies any chewing or swallowing difficulties at this time. NKFA. Reported  pounds.   Pt encouraged to improve protein-energy intake. Snack foods and supplements were offered; pt declined all. States her  will bring kefir.

## 2023-05-30 NOTE — PROGRESS NOTE ADULT - ASSESSMENT
##########INCOMPLETE     57 yo Female with MHx of HTN, MS on Ocrevus, admitted for N/V and fevers x 3 weeks.   Patient was found to have elevated stable AST/ALT ~>200, which she reports since have been elevated since february and was told may be secondary to Ocrevus infusion vs. genetic condition (Mother  of liver cirrhosis, non alcoholic, unknown etiology).  - US abd showed hepatic steatosis.   - CT A/P reported liver wnl.    - CTA chest: Limited evaluation for detection of pulmonary embolism due to suboptimal contrast opacification of the pulmonary arteries. No saddle pulmonary embolism or pulmonary embolism within the truncus anterior and interlobar pulmonary arteries. More peripheral branches are not adequately assessed. Right basilar 1.1 cm subpleural nodular opacity is indeterminate, but may represent subsegmental atelectasis. Recommend CT chest follow-up in 3 months to assess for stability/clearing  - TTE :Endocardium not well visualized; grossly normal left ventricular systolic function.    Hematology is following for  pancytopenia, fever and elevated ferritin concern for HLH   - Patient is still bicytopenic with WBC 3.4  improved from 2.1  and Hb 10.7 from 11.9 and PLT normalized, today 218.   - Ferritin initially >12K>>9K>>5K    - Tg 205, fibrinogen elevated   - LFTs are improving from >200 to ~130 today  - IL-2R >3K    -FLOW: No cellular spicules are present; very few hematopoietic elements are identified- those include mature and maturing myeloids, lymphocytes and occasional plasma cells. CYTOSPIN: Mature and maturing myeloids, normoblasts and monocytes.    -S/p BM BX on 23 : wet read: no definitive hemophagocytosis however there are megakaryocytes within the sinusoids which is the feature that may seen in reactive condition such as in HLH. Pending further staining. Complete report to be out on Tuesday.      Impression:  #Concern for HLH: patient with fevers, elevated ferritin (improving), bicytopenias, Elevated IL-2R>> concern for HLH. Flow with no findings. In the setting of improving Ferritin with no treatment it is less likely to be HLH. Still, IL2R elevated to >3K. Awaiting formal repoirt of the BM BX with staining.  -The diagnosis of HLH is made based on the five of the following nine findings: Fever =38.5°C.  Splenomegaly. Bicytopenia. Hypertriglyceridemia (fasting triglycerides >265 mg/dL) and/or hypofibrinogenemia (fibrinogen <150 mg/dL). Hemophagocytosis in bone marrow, spleen, lymph node, or liver Low or absent NK cell activity. Ferritin >500 ng/mL Elevated soluble CD25 (soluble IL-2 receptor alpha [Faith-2R]). Currently with fevers, cytopenias elevated ferritin (however it is improving slowly with no treatment) and elevated ILR. Tg <265, fibrinogen is not decreased, no splenomegaly on CT A/P. -BM BX with no  definitive hemophagocytosis pending further staining.     Plan:  -Agree with work up for Still's disease; It is associated with elevated ferritin, however no correlation between elevated IL2R and still'ss disease was demonstrated.    -Per hepatology will need liver bx. Pending IR-guided liver Bx. LFTs keep down trending   -Awaiting formal read of the BM bx. If no Hemophagocytosis, HLH is less likely and other inflammatory diseases should be considered    -Transfuse to maintain Hb>7 and PLT>10K; >20K if febrile, >50K if bleeding         Thank you for the consult, Hematology will continue to follow.     Case discussed with Dr. Kendrick Eldridge MD.   PGY-4 hematology/oncology fellow    ##########INCOMPLETE     59 yo Female with MHx of HTN, MS on Ocrevus, admitted for N/V and fevers x 3 weeks.   Patient was found to have elevated stable AST/ALT ~>200, which she reports since have been elevated since february and was told may be secondary to Ocrevus infusion vs. genetic condition (Mother  of liver cirrhosis, non alcoholic, unknown etiology).  - US abd showed hepatic steatosis.   - CT A/P reported liver wnl.    - CTA chest: Limited evaluation for detection of pulmonary embolism due to suboptimal contrast opacification of the pulmonary arteries. No saddle pulmonary embolism or pulmonary embolism within the truncus anterior and interlobar pulmonary arteries. More peripheral branches are not adequately assessed. Right basilar 1.1 cm subpleural nodular opacity is indeterminate, but may represent subsegmental atelectasis. Recommend CT chest follow-up in 3 months to assess for stability/clearing  - TTE :Endocardium not well visualized; grossly normal left ventricular systolic function.    Hematology is following for  pancytopenia, fever and elevated ferritin concern for HLH   - Patient is still bicytopenic with WBC 3.4  improved from 2.1  and Hb 10.7 from 11.9 and PLT normalized, today 218.   - Ferritin initially >12K>>9K>>5K    - Tg 205, fibrinogen elevated   - LFTs are improving from >200 to ~130 today  - IL-2R >3K    -FLOW: No cellular spicules are present; very few hematopoietic elements are identified- those include mature and maturing myeloids, lymphocytes and occasional plasma cells. CYTOSPIN: Mature and maturing myeloids, normoblasts and monocytes.    -S/p BM BX on 23 : wet read: no definitive hemophagocytosis however there are megakaryocytes within the sinusoids which is the feature that may seen in reactive condition such as in HLH. Pending further staining. Complete report to be out on Tuesday.      Impression:  #Concern for HLH: patient with fevers, elevated ferritin (improving), bicytopenias, Elevated IL-2R>> concern for HLH. Flow with no findings. In the setting of improving Ferritin with no treatment it is less likely to be HLH. Still, IL2R elevated to >3K. Awaiting formal repoirt of the BM BX with staining.  -The diagnosis of HLH is made based on the five of the following nine findings: Fever =38.5°C.  Splenomegaly. Bicytopenia. Hypertriglyceridemia (fasting triglycerides >265 mg/dL) and/or hypofibrinogenemia (fibrinogen <150 mg/dL). Hemophagocytosis in bone marrow, spleen, lymph node, or liver Low or absent NK cell activity. Ferritin >500 ng/mL Elevated soluble CD25 (soluble IL-2 receptor alpha [Faith-2R]). Currently with fevers, cytopenias elevated ferritin (however it is improving slowly with no treatment) and elevated ILR. Tg <265, fibrinogen is not decreased, no splenomegaly on CT A/P. -BM BX with no  definitive hemophagocytosis pending further staining.  May need to consider recent COVID infection (now she is negative, but patient reports high fevers and respiratory symptoms that started last month, not fully recovered since then), which can also drive inflammatory reaction with elevated ferritin and IL-2.     Plan:  -Agree with work up for Still's disease; It is associated with elevated ferritin, however no correlation between elevated IL2R and still'ss disease was demonstrated.    -Per hepatology will need liver bx. Pending IR-guided liver Bx. LFTs keep down trending   -Awaiting formal read of the BM bx. If no Hemophagocytosis, HLH is less likely and other inflammatory diseases should be considered    -Can obtain COVID immunoglobulin/titer, may curbside ID   -Transfuse to maintain Hb>7 and PLT>10K; >20K if febrile, >50K if bleeding         Thank you for the consult, Hematology will continue to follow.     Case discussed with Dr. Kendrick Eldridge MD.   PGY-4 hematology/oncology fellow    57 yo Female with MHx of HTN, MS on Ocrevus, admitted for N/V and fevers x 3 weeks.   Patient was found to have elevated stable AST/ALT ~>200, which she reports since have been elevated since february and was told may be secondary to Ocrevus infusion vs. genetic condition (Mother  of liver cirrhosis, non alcoholic, unknown etiology).  - US abd showed hepatic steatosis.   - CT A/P reported liver wnl.    - CTA chest: Limited evaluation for detection of pulmonary embolism due to suboptimal contrast opacification of the pulmonary arteries. No saddle pulmonary embolism or pulmonary embolism within the truncus anterior and interlobar pulmonary arteries. More peripheral branches are not adequately assessed. Right basilar 1.1 cm subpleural nodular opacity is indeterminate, but may represent subsegmental atelectasis. Recommend CT chest follow-up in 3 months to assess for stability/clearing  - TTE :Endocardium not well visualized; grossly normal left ventricular systolic function.    Hematology is following for  pancytopenia, fever and elevated ferritin concern for HLH   - Patient is still bicytopenic with WBC 3.4  improved from 2.1  and Hb 10.7 from 11.9 and PLT normalized, today 218.   - Ferritin initially >12K>>9K>>5K    - Tg 205, fibrinogen elevated   - LFTs are improving from >200 to ~130 today  - IL-2R >3K    -FLOW: No cellular spicules are present; very few hematopoietic elements are identified- those include mature and maturing myeloids, lymphocytes and occasional plasma cells. CYTOSPIN: Mature and maturing myeloids, normoblasts and monocytes.    -S/p BM BX on 23 : wet read: no definitive hemophagocytosis however there are megakaryocytes within the sinusoids which is the feature that may seen in reactive condition such as in HLH. Pending further staining. Complete report to be out on Tuesday.      Impression:  #Concern for HLH: patient with fevers, elevated ferritin (improving), bicytopenias, Elevated IL-2R>> concern for HLH. Flow with no findings. In the setting of improving Ferritin with no treatment it is less likely to be HLH. Still, IL2R elevated to >3K. Awaiting formal repoirt of the BM BX with staining.  -The diagnosis of HLH is made based on the five of the following nine findings: Fever =38.5°C.  Splenomegaly. Bicytopenia. Hypertriglyceridemia (fasting triglycerides >265 mg/dL) and/or hypofibrinogenemia (fibrinogen <150 mg/dL). Hemophagocytosis in bone marrow, spleen, lymph node, or liver Low or absent NK cell activity. Ferritin >500 ng/mL Elevated soluble CD25 (soluble IL-2 receptor alpha [Faith-2R]). Currently with fevers, cytopenias elevated ferritin (however it is improving slowly with no treatment) and elevated ILR. Tg <265, fibrinogen is not decreased, no splenomegaly on CT A/P. -BM BX with no  definitive hemophagocytosis pending further staining.  May need to consider recent COVID infection (now she is negative, but patient reports high fevers and respiratory symptoms that started last month, not fully recovered since then), which can also drive inflammatory reaction with elevated ferritin and IL-2.     Plan:  -Agree with work up for Still's disease; It is associated with elevated ferritin, however no correlation between elevated IL2R and Still's disease was demonstrated.    -Per hepatology will need liver bx. Pending IR-guided liver Bx. LFTs keep down trending   -Awaiting formal read of the BM bx. If no Hemophagocytosis, HLH is less likely and other inflammatory diseases should be considered    -Can obtain COVID immunoglobulin/titer, may curbside ID   -Transfuse to maintain Hb>7 and PLT>10K; >20K if febrile, >50K if bleeding     Thank you for the consult, Hematology will continue to follow.     Case discussed with Dr. Kendrick Eldridge MD.   PGY-4 hematology/oncology fellow

## 2023-05-30 NOTE — PROGRESS NOTE ADULT - SUBJECTIVE AND OBJECTIVE BOX
Gastroenterology/Hepatology Progress Note      Interval Events:     - Patient was febrile overnight 100.5  - has diffuse joint pain, but no abd pain, nausea, or vomiting.   - Tolerating po diet well.     Allergies:  No Known Allergies      Hospital Medications:  acetaminophen     Tablet .. 650 milliGRAM(s) Oral every 6 hours PRN  amitriptyline 50 milliGRAM(s) Oral daily  FIRST- Mouthwash  BLM 10 milliLiter(s) Swish and Spit three times a day  heparin   Injectable 5000 Unit(s) SubCutaneous every 8 hours  heparin  Lock Flush 100 Units/mL Injectable 100 Unit(s) IV Push once  ibuprofen  Tablet. 400 milliGRAM(s) Oral every 8 hours PRN  lidocaine 2% Injectable 20 milliLiter(s) Local Injection once  LORazepam     Tablet 0.25 milliGRAM(s) Oral once  melatonin 3 milliGRAM(s) Oral at bedtime PRN  ondansetron Injectable 4 milliGRAM(s) IV Push every 8 hours PRN  potassium chloride    Tablet ER 40 milliEquivalent(s) Oral every 4 hours  pregabalin 75 milliGRAM(s) Oral daily      ROS: 14 point ROS negative unless otherwise state in subjective    PHYSICAL EXAM:   Vital Signs:  Vital Signs Last 24 Hrs  T(C): 37.2 (30 May 2023 05:20), Max: 37.2 (30 May 2023 05:20)  T(F): 98.9 (30 May 2023 05:20), Max: 98.9 (30 May 2023 05:20)  HR: 95 (30 May 2023 05:20) (75 - 98)  BP: 108/53 (30 May 2023 05:20) (92/52 - 110/60)  BP(mean): --  RR: 18 (30 May 2023 05:20) (16 - 18)  SpO2: 95% (30 May 2023 05:20) (95% - 97%)    Parameters below as of 30 May 2023 05:20  Patient On (Oxygen Delivery Method): room air      Daily     Daily     PHYSICAL EXAM:     GENERAL:  NAD, lying in bed  HEENT:  NCAT, no scleral icterus, on nasal cannula  CHEST:  no respiratory distress  ABDOMEN:  Soft, non-tender, non-distended, no masses  EXTREMITIES: No LE edema  NEURO:  Alert and oriented x 3, no tremors.     LABS:                        10.3   8.87  )-----------( 258      ( 30 May 2023 07:23 )             31.3     Mean Cell Volume: 94.6 fL (- @ 07:23)        135  |  103  |  7   ----------------------------<  87  3.5   |  21<L>  |  0.57    Ca    7.6<L>      30 May 2023 07:23  Phos  2.6       Mg     2.20         TPro  5.6<L>  /  Alb  2.9<L>  /  TBili  0.6  /  DBili  x   /  AST  185<H>  /  ALT  149<H>  /  AlkPhos  84      LIVER FUNCTIONS - ( 30 May 2023 07:23 )  Alb: 2.9 g/dL / Pro: 5.6 g/dL / ALK PHOS: 84 U/L / ALT: 149 U/L / AST: 185 U/L / GGT: x           PT/INR - ( 30 May 2023 07:23 )   PT: 14.4 sec;   INR: 1.24 ratio           Urinalysis Basic - ( 29 May 2023 21:28 )    Color: Yellow / Appearance: Clear / S.048 / pH: x  Gluc: x / Ketone: Negative  / Bili: Negative / Urobili: <2 mg/dL   Blood: x / Protein: 30 mg/dL / Nitrite: Negative   Leuk Esterase: Negative / RBC: 10 /HPF / WBC 6 /HPF   Sq Epi: x / Non Sq Epi: x / Bacteria: Negative            Imaging:      Abd US     FINDINGS:  Liver: Echogenic liver parenchyma compatible with hepatic steatosis.  The   main portal vein is patent with hepatopedal flow.  Bile ducts: Normal caliber. Common bile duct measures 3 mm.  Gallbladder: Within normal limits.  Pancreas: Poorly visualized due to bowel gas  Spleen: 11.3 cm. Within normal limits.  Right kidney: 12.2 cm. Mild hydronephrosis.  Left kidney: 12.0 cm. No hydronephrosis.  Ascites: None.  Aorta and IVC: Visualized portions are within normal limits.  Bladder: Partially distended.  A left ureteral jet is visualized.  The   right ureteral jet is not visualized.    IMPRESSION:  Hepatic steatosis.    No sonographic evidence of cholelithiasis, acute cholecystitis or dilated   bile ducts.    Mild right hydronephrosis.  The right ureteral jet is not visualized.  A   right ureteral calculus or other cause for right ureteral obstruction is   not excluded. Underlying genitourinary infection and/or pyelonephritis   should be excluded based on clinical symptoms and laboratory values.    CT A/P on     FINDINGS:  LOWER CHEST: Left breast implant. Elevated right hemidiaphragm. Right   basilar atelectasis.    LIVER: Within normal limits.  BILE DUCTS: Normal caliber.  GALLBLADDER: Within normal limits.  SPLEEN: Within normal limits.  PANCREAS: Within normal limits.  ADRENALS: Within normal limits.  KIDNEYS/URETERS: Subcentimeter left renal hypodensity.    BLADDER: Within normal limits.  REPRODUCTIVE ORGANS: Uterus and adnexa within normal limits.    BOWEL: No bowel obstruction. Appendix normal. Multiple high attenuation   foci in the colon, likely ingested medications.  PERITONEUM: No ascites.  VESSELS: Within normal limits.  RETROPERITONEUM/LYMPH NODES: No lymphadenopathy.  ABDOMINAL WALL: Within normal limits.  BONES: Degenerative change.    IMPRESSION:  No acute intra-abdominal process

## 2023-05-30 NOTE — DIETITIAN INITIAL EVALUATION ADULT - PROBLEM SELECTOR PLAN 1
Febrile, Jlrb=418.8; Tachycardic, HB=137x-68i;  c/f bacteremia;   Due to likely gastroenteritis and possible UTI;   -VS q4h  -VBG with lactate in AM  -f/u BCx, UCx  -Zosyn IV  -CXR: no c/w PNA  -RVP negative

## 2023-05-30 NOTE — DIETITIAN INITIAL EVALUATION ADULT - ADD RECOMMEND
1. Consider liberalizing DASH restriction to regular.   2. Encourage PO intake and honor food preferences as able.   3. Continue to document PO in RN flow sheets and monitor weekly weights.

## 2023-05-30 NOTE — PROGRESS NOTE ADULT - PROBLEM SELECTOR PLAN 1
Sepsis/SIRS present on admission, unclear etiology   Initially thought to be d/t gastroenteritis but diarrhea resolved and still remains febrile   -persistent fevers despite antibiotics   -now w/ joint pain 5/28, will need to consider Still's disease and ruling out HLH as well   -blood and urine cultures NEG to date, repeat blood cultures NGTD   -GI PCR neg  -CXR w/ clear lungs   -RVP neg   -CT A/P unremarkable   - CT chest showed No saddle pulmonary embolism or pulmonary embolism within the truncus anterior and interlobar pulmonary arteries. Right basilar 1.1 cm subpleural nodular opacity is indeterminate, but may   represent subsegmental atelectasis. Recommend CT chest follow-up in 3 months to assess for stability/clearing.  -obtain MRI brain to r/o PML   -ESR/CRP high, f/up RF and anti-citrullinated peptide antibody  -ID following

## 2023-05-31 LAB
ANION GAP SERPL CALC-SCNC: 11 MMOL/L — SIGNIFICANT CHANGE UP (ref 7–14)
BASOPHILS # BLD AUTO: 0 K/UL — SIGNIFICANT CHANGE UP (ref 0–0.2)
BASOPHILS NFR BLD AUTO: 0 % — SIGNIFICANT CHANGE UP (ref 0–2)
BUN SERPL-MCNC: 6 MG/DL — LOW (ref 7–23)
CALCIUM SERPL-MCNC: 7.9 MG/DL — LOW (ref 8.4–10.5)
CALRETICULIN INTERPRETATION: SIGNIFICANT CHANGE UP
CHLORIDE SERPL-SCNC: 101 MMOL/L — SIGNIFICANT CHANGE UP (ref 98–107)
CO2 SERPL-SCNC: 20 MMOL/L — LOW (ref 22–31)
CREAT SERPL-MCNC: 0.51 MG/DL — SIGNIFICANT CHANGE UP (ref 0.5–1.3)
EGFR: 108 ML/MIN/1.73M2 — SIGNIFICANT CHANGE UP
EOSINOPHIL # BLD AUTO: 0 K/UL — SIGNIFICANT CHANGE UP (ref 0–0.5)
EOSINOPHIL NFR BLD AUTO: 0 % — SIGNIFICANT CHANGE UP (ref 0–6)
GIANT PLATELETS BLD QL SMEAR: PRESENT — SIGNIFICANT CHANGE UP
GLUCOSE SERPL-MCNC: 90 MG/DL — SIGNIFICANT CHANGE UP (ref 70–99)
HCT VFR BLD CALC: 32.8 % — LOW (ref 34.5–45)
HEMATOPATHOLOGY REPORT: SIGNIFICANT CHANGE UP
HGB BLD-MCNC: 10.8 G/DL — LOW (ref 11.5–15.5)
IANC: 5.09 K/UL — SIGNIFICANT CHANGE UP (ref 1.8–7.4)
IGG SERPL-MCNC: 804 MG/DL — SIGNIFICANT CHANGE UP (ref 586–1602)
IGG1 SER-MCNC: 375 MG/DL — SIGNIFICANT CHANGE UP (ref 248–810)
IGG2 SER-MCNC: 172 MG/DL — SIGNIFICANT CHANGE UP (ref 130–555)
IGG3 SER-MCNC: 61 MG/DL — SIGNIFICANT CHANGE UP (ref 15–102)
IGG4 SER-MCNC: 13 MG/DL — SIGNIFICANT CHANGE UP (ref 2–96)
LYMPHOCYTES # BLD AUTO: 1.21 K/UL — SIGNIFICANT CHANGE UP (ref 1–3.3)
LYMPHOCYTES # BLD AUTO: 15.1 % — SIGNIFICANT CHANGE UP (ref 13–44)
MACROCYTES BLD QL: SIGNIFICANT CHANGE UP
MAGNESIUM SERPL-MCNC: 2.2 MG/DL — SIGNIFICANT CHANGE UP (ref 1.6–2.6)
MANUAL SMEAR VERIFICATION: SIGNIFICANT CHANGE UP
MCHC RBC-ENTMCNC: 31.7 PG — SIGNIFICANT CHANGE UP (ref 27–34)
MCHC RBC-ENTMCNC: 32.9 GM/DL — SIGNIFICANT CHANGE UP (ref 32–36)
MCV RBC AUTO: 96.2 FL — SIGNIFICANT CHANGE UP (ref 80–100)
MICROCYTES BLD QL: SLIGHT — SIGNIFICANT CHANGE UP
MONOCYTES # BLD AUTO: 0.43 K/UL — SIGNIFICANT CHANGE UP (ref 0–0.9)
MONOCYTES NFR BLD AUTO: 5.3 % — SIGNIFICANT CHANGE UP (ref 2–14)
NEUTROPHILS # BLD AUTO: 6.38 K/UL — SIGNIFICANT CHANGE UP (ref 1.8–7.4)
NEUTROPHILS NFR BLD AUTO: 69.9 % — SIGNIFICANT CHANGE UP (ref 43–77)
NEUTS BAND # BLD: 9.7 % — HIGH (ref 0–6)
PHOSPHATE SERPL-MCNC: 2.7 MG/DL — SIGNIFICANT CHANGE UP (ref 2.5–4.5)
PLAT MORPH BLD: NORMAL — SIGNIFICANT CHANGE UP
PLATELET # BLD AUTO: 296 K/UL — SIGNIFICANT CHANGE UP (ref 150–400)
PLATELET COUNT - ESTIMATE: NORMAL — SIGNIFICANT CHANGE UP
POTASSIUM SERPL-MCNC: 3.3 MMOL/L — LOW (ref 3.5–5.3)
POTASSIUM SERPL-SCNC: 3.3 MMOL/L — LOW (ref 3.5–5.3)
RBC # BLD: 3.41 M/UL — LOW (ref 3.8–5.2)
RBC # FLD: 14 % — SIGNIFICANT CHANGE UP (ref 10.3–14.5)
RBC BLD AUTO: NORMAL — SIGNIFICANT CHANGE UP
SODIUM SERPL-SCNC: 132 MMOL/L — LOW (ref 135–145)
SPHEROCYTES BLD QL SMEAR: SLIGHT — SIGNIFICANT CHANGE UP
WBC # BLD: 8.02 K/UL — SIGNIFICANT CHANGE UP (ref 3.8–10.5)
WBC # FLD AUTO: 8.02 K/UL — SIGNIFICANT CHANGE UP (ref 3.8–10.5)

## 2023-05-31 PROCEDURE — 99232 SBSQ HOSP IP/OBS MODERATE 35: CPT

## 2023-05-31 PROCEDURE — 99223 1ST HOSP IP/OBS HIGH 75: CPT | Mod: GC

## 2023-05-31 PROCEDURE — 70553 MRI BRAIN STEM W/O & W/DYE: CPT | Mod: 26

## 2023-05-31 PROCEDURE — 99233 SBSQ HOSP IP/OBS HIGH 50: CPT

## 2023-05-31 RX ORDER — POTASSIUM CHLORIDE 20 MEQ
40 PACKET (EA) ORAL ONCE
Refills: 0 | Status: DISCONTINUED | OUTPATIENT
Start: 2023-05-31 | End: 2023-05-31

## 2023-05-31 RX ORDER — POTASSIUM CHLORIDE 20 MEQ
40 PACKET (EA) ORAL ONCE
Refills: 0 | Status: COMPLETED | OUTPATIENT
Start: 2023-05-31 | End: 2023-05-31

## 2023-05-31 RX ORDER — FAMOTIDINE 10 MG/ML
20 INJECTION INTRAVENOUS DAILY
Refills: 0 | Status: DISCONTINUED | OUTPATIENT
Start: 2023-05-31 | End: 2023-06-03

## 2023-05-31 RX ORDER — DIPHENHYDRAMINE HCL 50 MG
25 CAPSULE ORAL EVERY 6 HOURS
Refills: 0 | Status: DISCONTINUED | OUTPATIENT
Start: 2023-05-31 | End: 2023-05-31

## 2023-05-31 RX ORDER — DIPHENHYDRAMINE HCL 50 MG
25 CAPSULE ORAL EVERY 6 HOURS
Refills: 0 | Status: DISCONTINUED | OUTPATIENT
Start: 2023-05-31 | End: 2023-06-08

## 2023-05-31 RX ORDER — LORATADINE 10 MG/1
10 TABLET ORAL
Refills: 0 | Status: DISCONTINUED | OUTPATIENT
Start: 2023-05-31 | End: 2023-06-08

## 2023-05-31 RX ADMIN — HEPARIN SODIUM 5000 UNIT(S): 5000 INJECTION INTRAVENOUS; SUBCUTANEOUS at 06:15

## 2023-05-31 RX ADMIN — Medication 0.25 MILLIGRAM(S): at 17:30

## 2023-05-31 RX ADMIN — DIPHENHYDRAMINE HYDROCHLORIDE AND LIDOCAINE HYDROCHLORIDE AND ALUMINUM HYDROXIDE AND MAGNESIUM HYDRO 10 MILLILITER(S): KIT at 21:03

## 2023-05-31 RX ADMIN — Medication 75 MILLIGRAM(S): at 12:21

## 2023-05-31 RX ADMIN — DIPHENHYDRAMINE HYDROCHLORIDE AND LIDOCAINE HYDROCHLORIDE AND ALUMINUM HYDROXIDE AND MAGNESIUM HYDRO 10 MILLILITER(S): KIT at 06:15

## 2023-05-31 RX ADMIN — Medication 3 MILLIGRAM(S): at 21:11

## 2023-05-31 RX ADMIN — HEPARIN SODIUM 5000 UNIT(S): 5000 INJECTION INTRAVENOUS; SUBCUTANEOUS at 13:24

## 2023-05-31 RX ADMIN — Medication 40 MILLIEQUIVALENT(S): at 16:14

## 2023-05-31 RX ADMIN — Medication 50 MILLIGRAM(S): at 12:23

## 2023-05-31 RX ADMIN — HEPARIN SODIUM 5000 UNIT(S): 5000 INJECTION INTRAVENOUS; SUBCUTANEOUS at 21:03

## 2023-05-31 RX ADMIN — DIPHENHYDRAMINE HYDROCHLORIDE AND LIDOCAINE HYDROCHLORIDE AND ALUMINUM HYDROXIDE AND MAGNESIUM HYDRO 10 MILLILITER(S): KIT at 13:23

## 2023-05-31 RX ADMIN — Medication 25 MILLIGRAM(S): at 12:21

## 2023-05-31 RX ADMIN — FAMOTIDINE 20 MILLIGRAM(S): 10 INJECTION INTRAVENOUS at 12:22

## 2023-05-31 RX ADMIN — LORATADINE 10 MILLIGRAM(S): 10 TABLET ORAL at 21:11

## 2023-05-31 NOTE — PROGRESS NOTE ADULT - SUBJECTIVE AND OBJECTIVE BOX
Follow Up:      [place] History/ROS:Patient is a 58y old  Female who presents with a chief complaint of Fever, nausea, vomiting (31 May 2023 14:36)    No fever  C/o nonspecific joint pain    Allergies    No Known Allergies    Intolerances        ANTIMICROBIALS:      OTHER MEDS:  acetaminophen     Tablet .. 650 milliGRAM(s) Oral every 6 hours PRN  amitriptyline 50 milliGRAM(s) Oral daily  diphenhydrAMINE 25 milliGRAM(s) Oral every 6 hours PRN  famotidine    Tablet 20 milliGRAM(s) Oral daily  FIRST- Mouthwash  BLM 10 milliLiter(s) Swish and Spit three times a day  heparin   Injectable 5000 Unit(s) SubCutaneous every 8 hours  heparin  Lock Flush 100 Units/mL Injectable 100 Unit(s) IV Push once  ibuprofen  Tablet. 400 milliGRAM(s) Oral every 8 hours PRN  lactated ringers. 1000 milliLiter(s) IV Continuous <Continuous>  lidocaine 2% Injectable 20 milliLiter(s) Local Injection once  LORazepam     Tablet 0.25 milliGRAM(s) Oral once  melatonin 3 milliGRAM(s) Oral at bedtime PRN  ondansetron Injectable 4 milliGRAM(s) IV Push every 8 hours PRN  potassium chloride    Tablet ER 40 milliEquivalent(s) Oral every 4 hours  pregabalin 75 milliGRAM(s) Oral daily      Vital Signs Last 24 Hrs  T(C): 37.3 (31 May 2023 11:57), Max: 37.3 (31 May 2023 11:57)  T(F): 99.1 (31 May 2023 11:57), Max: 99.1 (31 May 2023 11:57)  HR: 104 (31 May 2023 11:57) (102 - 104)  BP: 99/51 (31 May 2023 11:57) (99/51 - 104/57)  BP(mean): --  RR: 17 (31 May 2023 11:57) (17 - 18)  SpO2: 96% (31 May 2023 11:57) (96% - 97%)    Parameters below as of 31 May 2023 11:57  Patient On (Oxygen Delivery Method): room air        PHYSICAL EXAM:  General: [x ] non-toxic  HEAD/EYES: [ ] PERRL [x ] white sclera [ ] icterus  ENT:  [ ] normal x[ ] supple [ ] thrush [ ] pharyngeal exudate  Cardiovascular:   [ ] murmur [x ] normal [ ] PPM/AICD  Respiratory:  [ x] clear to ausculation bilaterally  GI:  [ x] soft, non-tender, normal bowel sounds  :  [ ] [place]wick [ ] no CVA tenderness   Musculoskeletal:  [ ] no synovitis  Neurologic:  [ ] non-focal exam   Skin:  [x ] rash- similar to thrusday skin findings- blanching erythema to abdomen  erythema to UE  Lymph: [ x] no lymphadenopathy  Psychiatric:  [ ] appropriate affect [ x] alert & oriented  Lines:  [x ] no phlebitis [ ] central line                                10.8   8.02  )-----------( 296      ( 31 May 2023 05:44 )             32.8           132<L>  |  101  |  6<L>  ----------------------------<  90  3.3<L>   |  20<L>  |  0.51    Ca    7.9<L>      31 May 2023 05:44  Phos  2.7       Mg     2.20         TPro  5.6<L>  /  Alb  2.9<L>  /  TBili  0.6  /  DBili  x   /  AST  185<H>  /  ALT  149<H>  /  AlkPhos  84  0530      Urinalysis Basic - ( 29 May 2023 21:28 )    Color: Yellow / Appearance: Clear / S.048 / pH: x  Gluc: x / Ketone: Negative  / Bili: Negative / Urobili: <2 mg/dL   Blood: x / Protein: 30 mg/dL / Nitrite: Negative   Leuk Esterase: Negative / RBC: 10 /HPF / WBC 6 /HPF   Sq Epi: x / Non Sq Epi: x / Bacteria: Negative        MICROBIOLOGY:Culture Results:   No growth to date. (23 @ 01:55)  Culture Results:   No growth to date. (23 @ 23:36)      RADIOLOGY:

## 2023-05-31 NOTE — PROGRESS NOTE ADULT - SUBJECTIVE AND OBJECTIVE BOX
Patient is a 58y old  Female who presents with a chief complaint of Fever, nausea, vomiting (31 May 2023 13:33)      SUBJECTIVE / OVERNIGHT EVENTS: Pt seen and examined at 10:45am, no overnight events, reports rash and itching since 2am last night, thinks that it is related to the hospital socks then she wore, did not sleep well due to her symptoms, says that she gets facial rash related to her MS flare and this rash is different,  no other complaints, no other new issues reported.        MEDICATIONS  (STANDING):  amitriptyline 50 milliGRAM(s) Oral daily  famotidine    Tablet 20 milliGRAM(s) Oral daily  FIRST- Mouthwash  BLM 10 milliLiter(s) Swish and Spit three times a day  heparin   Injectable 5000 Unit(s) SubCutaneous every 8 hours  heparin  Lock Flush 100 Units/mL Injectable 100 Unit(s) IV Push once  lactated ringers. 1000 milliLiter(s) (50 mL/Hr) IV Continuous <Continuous>  lidocaine 2% Injectable 20 milliLiter(s) Local Injection once  LORazepam     Tablet 0.25 milliGRAM(s) Oral once  potassium chloride    Tablet ER 40 milliEquivalent(s) Oral every 4 hours  potassium chloride   Powder 40 milliEquivalent(s) Oral once  pregabalin 75 milliGRAM(s) Oral daily    MEDICATIONS  (PRN):  acetaminophen     Tablet .. 650 milliGRAM(s) Oral every 6 hours PRN Temp greater or equal to 38C (100.4F)  diphenhydrAMINE 25 milliGRAM(s) Oral every 6 hours PRN Rash and/or Itching  ibuprofen  Tablet. 400 milliGRAM(s) Oral every 8 hours PRN Temp greater or equal to 38C (100.4F)  melatonin 3 milliGRAM(s) Oral at bedtime PRN Insomnia  ondansetron Injectable 4 milliGRAM(s) IV Push every 8 hours PRN Nausea and/or Vomiting      Vital Signs Last 24 Hrs  T(C): 37.3 (31 May 2023 11:57), Max: 37.3 (31 May 2023 11:57)  T(F): 99.1 (31 May 2023 11:57), Max: 99.1 (31 May 2023 11:57)  HR: 104 (31 May 2023 11:57) (102 - 104)  BP: 99/51 (31 May 2023 11:57) (99/51 - 104/57)  BP(mean): --  RR: 17 (31 May 2023 11:57) (17 - 18)  SpO2: 96% (31 May 2023 11:57) (96% - 98%)    Parameters below as of 31 May 2023 11:57  Patient On (Oxygen Delivery Method): room air      CAPILLARY BLOOD GLUCOSE        I&O's Summary    31 May 2023 07:01  -  31 May 2023 14:37  --------------------------------------------------------  IN: 250 mL / OUT: 0 mL / NET: 250 mL        PHYSICAL EXAM:  GENERAL: NAD, well-developed  CHEST/LUNG: Clear to auscultation bilaterally; No wheeze  HEART: Tachy to 104/mt  ABDOMEN: Soft, Nontender, Nondistended  EXTREMITIES: no LE edema  PSYCH: Calm  NEUROLOGY: AAOx3  SKIN: diffuse Maculopapular rash on the legs, chest, abdomen, face    LABS:                        10.8   8.02  )-----------( 296      ( 31 May 2023 05:44 )             32.8     05-31    132<L>  |  101  |  6<L>  ----------------------------<  90  3.3<L>   |  20<L>  |  0.51    Ca    7.9<L>      31 May 2023 05:44  Phos  2.7     -  Mg     2.20     -    TPro  5.6<L>  /  Alb  2.9<L>  /  TBili  0.6  /  DBili  x   /  AST  185<H>  /  ALT  149<H>  /  AlkPhos  84  05-30    PT/INR - ( 30 May 2023 07:23 )   PT: 14.4 sec;   INR: 1.24 ratio               Urinalysis Basic - ( 29 May 2023 21:28 )    Color: Yellow / Appearance: Clear / S.048 / pH: x  Gluc: x / Ketone: Negative  / Bili: Negative / Urobili: <2 mg/dL   Blood: x / Protein: 30 mg/dL / Nitrite: Negative   Leuk Esterase: Negative / RBC: 10 /HPF / WBC 6 /HPF   Sq Epi: x / Non Sq Epi: x / Bacteria: Negative        RADIOLOGY & ADDITIONAL TESTS:    Imaging Personally Reviewed:    Consultant(s) Notes Reviewed:      Care Discussed with Consultants/Other Providers:

## 2023-05-31 NOTE — PROGRESS NOTE ADULT - PROBLEM SELECTOR PLAN 1
Sepsis/SIRS present on admission, unclear etiology   Initially thought to be d/t gastroenteritis but diarrhea resolved and still remains febrile   -persistent fevers despite antibiotics   -now w/ joint pain 5/28, will need to consider Still's disease and ruling out HLH as well   -blood and urine cultures NEG to date, repeat blood cultures NGTD   -GI PCR neg  -CXR w/ clear lungs   -RVP neg   -CT A/P unremarkable   - CT chest showed No saddle pulmonary embolism or pulmonary embolism within the truncus anterior and interlobar pulmonary arteries. Right basilar 1.1 cm subpleural nodular opacity is indeterminate, but may   represent subsegmental atelectasis. Recommend CT chest follow-up in 3 months to assess for stability/clearing.  -obtain MRI brain to r/o PML   -ESR/CRP high,  RF neg<10,  and anti-citrullinated peptide antibody <8 ( neg)  -ID following

## 2023-05-31 NOTE — PROGRESS NOTE ADULT - PROBLEM SELECTOR PLAN 4
-TTE w/ grossly normal left ventricular systolic function, normal RV   -d-dimer 947   - CTA chest with no PE  -monitor HR  dc tele

## 2023-05-31 NOTE — PROGRESS NOTE ADULT - PROBLEM SELECTOR PLAN 2
Elevated Ferritin w/ bicytopenia, elevated LFT's, and persistent fevers. Ruling out HLH   -Ferritin 67559,   -Ferritin trending down w/o any intervention   -triglyceride levels 205, Fibrinogen wnl   -s/p bone marrow biopsy 5/25, f/up results   -Heme input appreciated

## 2023-05-31 NOTE — PROGRESS NOTE ADULT - ASSESSMENT
57 y/o F PMHx HTN and MS (on ocrelizumab), presented with generalized weakness, fever/chills, and N/V. Persistent fever during admission with labs significant for transaminitis, bicytopenia, and markedly elevated ferritin. S/p BM biopsy for HLH workup.     Last fever 101.6 (5/23)  Ferritin 12k  Blood Cultures no growth  CMV,EBV,HSV PCR neg    Impression:  #Fever  #Transaminitis  #Elevated Ferritin  #Weakness, Unsteady Gait    Findings suggestive of non-infectious etiology, ?HLH vs Still's   Heme eval in progress- no hemophagocytosis seen on bone marrow biopsy    She had blanching rash on Thursday c/o sore throat over the weekend. She has had fever, transaminitis, elevated ferritin.  She notes joint pain (but more chronic than acute).  No leukocytosis- was leukopenic/    Infectious work up unrevealing, EBV serologies suggestive of past infection unrelated to acute presentation  I suspect a non-infectious etiology  Recs:  - monitor off antibiotics  - monitor CBC  - monitor temp  - pending MRI Brain  - consider further work up for still's  -No ID objection to trial of steroids if otherwise indicated

## 2023-05-31 NOTE — CONSULT NOTE ADULT - ATTENDING COMMENTS
Diffuse papular wheals noted on the body. Differential includes acute urticaria vs. urticaria in the context of an underlying systemic disease. Rheumatologic diseases, such as Adult-onset Still disease may present with this type of rash. There are no obvious drug or infectious culprits which may have lead to urticaria. In light of this current admission, an underlying rheumatologic illness is a strong consideration. Recommend rheumatology consultation. Would start antihistamines as above. Would consider prednisone pending evaluation and treatment plan as per rheumatology. Diffuse papular wheals noted on the body. Differential includes acute urticaria vs. urticaria in the context of an underlying systemic disease. Rheumatologic diseases, such as Adult-onset Still disease may present with this type of rash. There are no obvious drug or infectious culprits which may have lead to urticaria. In light of this current admission, an underlying rheumatologic illness is a strong consideration. Recommend rheumatology consultation. Would start antihistamines as above. The rashes associated with HLH are non-specific. We cannot rule HLH in nor out based on the cutaneous manifestations.     Would consider prednisone pending evaluation and treatment plan as per rheumatology. Reviewed hepatology, ID, and heme/onc notes. Discussed with ID. Will continue to follow with you.

## 2023-05-31 NOTE — CONSULT NOTE ADULT - ASSESSMENT
ASSESSMENT/PLAN:  Differential includes Still’s disease (given fever of unknown origin) vs urticaria (possibly drug-related).  - Marked outline of urticarial papules to evaluate if the rash varies at different times, will monitor again tomorrow  - Would recommend consulting rheumatology to help rule out/manage Still’s disease  - start loratadine 10mg PO BID and diphenhydramine q6h PRN itch (please be cautious about her fally risk)      Recommendations were communicated with the primary team (Eliana)  The patient's chart was reviewed in addition to being examined at bedside with the dermatology attending. Discussed plans with the dermatology attending, Dr. Lund  Dermatology will continue to follow. Thank you for consulting our service.    Elizabeth Arroyo MD MPH  Resident Physician, PGY3  [facility]  Office: [phone]  Pager: [phone]  **Please page with 10-DIGIT callback # for further related questions.** ASSESSMENT/PLAN:  Differential includes Still’s disease (given fever of unknown origin) vs urticaria (possibly drug-related).  - Marked outline of urticarial papules to evaluate if the rash varies at different times, will monitor again tomorrow  - Would recommend consulting rheumatology to assess for Still’s disease  - start loratadine 10mg PO BID and diphenhydramine q6h PRN itch, would hold for oversedation      Recommendations were communicated with the primary team (Eliana)  The patient's chart was reviewed in addition to being examined at bedside with the dermatology attending. Discussed plans with the dermatology attending, Dr. Lund  Dermatology will continue to follow. Thank you for consulting our service.    Elizabeth Arroyo MD MPH  Resident Physician, PGY3  [facility]at[facility]  Office: [phone]  Pager: [phone]  **Please page with 10-DIGIT callback # for further related questions.**

## 2023-05-31 NOTE — CONSULT NOTE ADULT - SUBJECTIVE AND OBJECTIVE BOX
PAST MEDICAL & SURGICAL HISTORY:  MS (multiple sclerosis)      HTN (hypertension)      No significant past surgical history          REVIEW OF SYSTEMS:  General: no fevers/chills; no lethargy  Skin/Breast: see HPI  Ophthalmologic: no eye pain or changes in vision  ENT: no dysphagia or changes in hearing  Respiratory: no SOB or cough  Cardiovascular: no palpitations or chest pain  Gastrointestinal: no abdominal pain or blood in stool  Genitourinary: no dysuria or frequency  Musculoskeletal: no joint pains or weakness  Neurological: no weakness, numbness, or tingling    MEDICATIONS  (STANDING):  amitriptyline 50 milliGRAM(s) Oral daily  famotidine    Tablet 20 milliGRAM(s) Oral daily  FIRST- Mouthwash  BLM 10 milliLiter(s) Swish and Spit three times a day  heparin   Injectable 5000 Unit(s) SubCutaneous every 8 hours  heparin  Lock Flush 100 Units/mL Injectable 100 Unit(s) IV Push once  lactated ringers. 1000 milliLiter(s) (50 mL/Hr) IV Continuous <Continuous>  lidocaine 2% Injectable 20 milliLiter(s) Local Injection once  LORazepam     Tablet 0.25 milliGRAM(s) Oral once  potassium chloride    Tablet ER 40 milliEquivalent(s) Oral every 4 hours  pregabalin 75 milliGRAM(s) Oral daily    MEDICATIONS  (PRN):  acetaminophen     Tablet .. 650 milliGRAM(s) Oral every 6 hours PRN Temp greater or equal to 38C (100.4F)  diphenhydrAMINE 25 milliGRAM(s) Oral every 6 hours PRN Rash and/or Itching  ibuprofen  Tablet. 400 milliGRAM(s) Oral every 8 hours PRN Temp greater or equal to 38C (100.4F)  melatonin 3 milliGRAM(s) Oral at bedtime PRN Insomnia  ondansetron Injectable 4 milliGRAM(s) IV Push every 8 hours PRN Nausea and/or Vomiting      Allergies    No Known Allergies    Intolerances        SOCIAL HISTORY: No pertinent social history.    FAMILY HISTORY:  FH: cirrhosis (Mother)    FH: esophageal cancer (Father)     No pertinent family history in first degree relatives.    Vital Signs Last 24 Hrs  T(C): 37.3 (31 May 2023 11:57), Max: 37.3 (31 May 2023 11:57)  T(F): 99.1 (31 May 2023 11:57), Max: 99.1 (31 May 2023 11:57)  HR: 104 (31 May 2023 11:57) (102 - 104)  BP: 99/51 (31 May 2023 11:57) (99/51 - 104/57)  BP(mean): --  RR: 17 (31 May 2023 11:57) (17 - 18)  SpO2: 96% (31 May 2023 11:57) (96% - 98%)    Parameters below as of 31 May 2023 11:57  Patient On (Oxygen Delivery Method): room air        PHYSICAL EXAM:  The patient was AOx3 and in NAD.  OP showed no ulcerations.  There was no visible LAD.  Conjunctiva were non-injected.  There was no clubbing or edema of extremities.  The scalp, hair, face, eyebrows, lips, OP, neck, chest, back, extremities x4, and nails were examined.  There was no hyperhidrosis or bromhidrosis.    Of note on skin exam:    LABS:                        10.8   8.02  )-----------( 296      ( 31 May 2023 05:44 )             32.8     05-31    132<L>  |  101  |  6<L>  ----------------------------<  90  3.3<L>   |  20<L>  |  0.51    Ca    7.9<L>      31 May 2023 05:44  Phos  2.7     05-31  Mg     2.20     05-31    TPro  5.6<L>  /  Alb  2.9<L>  /  TBili  0.6  /  DBili  x   /  AST  185<H>  /  ALT  149<H>  /  AlkPhos  84  05-30    PT/INR - ( 30 May 2023 07:23 )   PT: 14.4 sec;   INR: 1.24 ratio           Urinalysis Basic - ( 29 May 2023 21:28 )    Color: Yellow / Appearance: Clear / S.048 / pH: x  Gluc: x / Ketone: Negative  / Bili: Negative / Urobili: <2 mg/dL   Blood: x / Protein: 30 mg/dL / Nitrite: Negative   Leuk Esterase: Negative / RBC: 10 /HPF / WBC 6 /HPF   Sq Epi: x / Non Sq Epi: x / Bacteria: Negative        RADIOLOGY & ADDITIONAL STUDIES: HPI:  59yo Female with MHx of HTN, MS c/o 3 days of generalized weakness, fever and chills with associated nbnb vomiting. Also reports 2 episodes of nb diarrhea today and yesterday with associated moerate Abd discomfort in the epigastric region. States has been taking Tylenol for fever and gen body pain, specifically:  1 day ago took 1g, 2 days ago took 4g, and 3 days ago took 1g. Otherwise reports no cp/sob, abd pain, urinary symptoms, recent travel, cough, sore throat, rhinorrhea, leg swelling, recent travel. Pt gets Ocrevus infusion every 6 months; Last infusion was in .   Of note, the pt reports elevated liver enzymes in "200s" since February, which were investigated by her gastroenterologist (unclear etiology). Possibly attributed to Ocrevus infusion per the pt. (20 May 2023 03:46)    Dermatology consulted for pruritic rash that occurred on Thursday (only on the abdomen and right arm), disappeared then recurred overnight (now diffuse). Patient reports the rash is itchy. Notably, patient also has a chronic intermittent asymptomatic rash onface. She reports the rash typically affects her hairline, eyebrows and nasolabial folds and resolves either spontaneously or with mometasone.    PAST MEDICAL & SURGICAL HISTORY:  MS (multiple sclerosis)  HTN (hypertension)      No significant past surgical history          REVIEW OF SYSTEMS:  General: no fevers/chills; +lethargy  Skin/Breast: see HPI  Ophthalmologic: no eye pain or changes in vision  ENT: no dysphagia or changes in hearing  Respiratory: no SOB or cough  Cardiovascular: no palpitations or chest pain  Gastrointestinal: no abdominal pain or blood in stool  Genitourinary: no dysuria or frequency  Musculoskeletal: no joint pains or weakness  Neurological: no weakness, numbness, or tingling    MEDICATIONS  (STANDING):  amitriptyline 50 milliGRAM(s) Oral daily  famotidine    Tablet 20 milliGRAM(s) Oral daily  FIRST- Mouthwash  BLM 10 milliLiter(s) Swish and Spit three times a day  heparin   Injectable 5000 Unit(s) SubCutaneous every 8 hours  heparin  Lock Flush 100 Units/mL Injectable 100 Unit(s) IV Push once  lactated ringers. 1000 milliLiter(s) (50 mL/Hr) IV Continuous <Continuous>  lidocaine 2% Injectable 20 milliLiter(s) Local Injection once  LORazepam     Tablet 0.25 milliGRAM(s) Oral once  potassium chloride    Tablet ER 40 milliEquivalent(s) Oral every 4 hours  pregabalin 75 milliGRAM(s) Oral daily    MEDICATIONS  (PRN):  acetaminophen     Tablet .. 650 milliGRAM(s) Oral every 6 hours PRN Temp greater or equal to 38C (100.4F)  diphenhydrAMINE 25 milliGRAM(s) Oral every 6 hours PRN Rash and/or Itching  ibuprofen  Tablet. 400 milliGRAM(s) Oral every 8 hours PRN Temp greater or equal to 38C (100.4F)  melatonin 3 milliGRAM(s) Oral at bedtime PRN Insomnia  ondansetron Injectable 4 milliGRAM(s) IV Push every 8 hours PRN Nausea and/or Vomiting      Allergies    No Known Allergies    Intolerances        SOCIAL HISTORY: No pertinent social history.    FAMILY HISTORY:  FH: cirrhosis (Mother)  FH: esophageal cancer (Father)    Vital Signs Last 24 Hrs  T(C): 37.3 (31 May 2023 11:57), Max: 37.3 (31 May 2023 11:57)  T(F): 99.1 (31 May 2023 11:57), Max: 99.1 (31 May 2023 11:57)  HR: 104 (31 May 2023 11:57) (102 - 104)  BP: 99/51 (31 May 2023 11:57) (99/51 - 104/57)  BP(mean): --  RR: 17 (31 May 2023 11:57) (17 - 18)  SpO2: 96% (31 May 2023 11:57) (96% - 98%)    Parameters below as of 31 May 2023 11:57  Patient On (Oxygen Delivery Method): room air    PHYSICAL EXAM:  The patient was AOx3 and in NAD.  OP showed no ulcerations.  There was no visible LAD.  Conjunctiva were non-injected.  There was no clubbing or edema of extremities.  The scalp, hair, face, eyebrows, lips, OP, neck, chest, back, extremities x4, and nails were examined.  There was no hyperhidrosis or bromhidrosis.    Of note on skin exam:  Urticarial papules on tip of nose, trunk and extremities  Palmar and plantar erythema  Erythematous plaques with white-yellow scale on hairline, eyebrows and nasolabial folds    LABS:                        10.8   8.02  )-----------( 296      ( 31 May 2023 05:44 )             32.8         132<L>  |  101  |  6<L>  ----------------------------<  90  3.3<L>   |  20<L>  |  0.51    Ca    7.9<L>      31 May 2023 05:44  Phos  2.7       Mg     2.20         TPro  5.6<L>  /  Alb  2.9<L>  /  TBili  0.6  /  DBili  x   /  AST  185<H>  /  ALT  149<H>  /  AlkPhos  84      PT/INR - ( 30 May 2023 07:23 )   PT: 14.4 sec;   INR: 1.24 ratio           Urinalysis Basic - ( 29 May 2023 21:28 )    Color: Yellow / Appearance: Clear / S.048 / pH: x  Gluc: x / Ketone: Negative  / Bili: Negative / Urobili: <2 mg/dL   Blood: x / Protein: 30 mg/dL / Nitrite: Negative   Leuk Esterase: Negative / RBC: 10 /HPF / WBC 6 /HPF   Sq Epi: x / Non Sq Epi: x / Bacteria: Negative     HPI:  59yo Female with MHx of HTN, MS c/o 3 days of generalized weakness, fever and chills with associated nbnb vomiting. Also reports 2 episodes of nb diarrhea today and yesterday with associated moerate Abd discomfort in the epigastric region. States has been taking Tylenol for fever and gen body pain, specifically:  1 day ago took 1g, 2 days ago took 4g, and 3 days ago took 1g. Otherwise reports no cp/sob, abd pain, urinary symptoms, recent travel, cough, sore throat, rhinorrhea, leg swelling, recent travel. Pt gets Ocrevus infusion every 6 months; Last infusion was in .   Of note, the pt reports elevated liver enzymes in "200s" since February, which were investigated by her gastroenterologist (unclear etiology). Possibly attributed to Ocrevus infusion per the pt. (20 May 2023 03:46)    Dermatology consulted for pruritic rash that occurred on Thursday (only on the abdomen and right arm), disappeared then recurred overnight (now diffuse). Patient reports the rash is itchy. Notably, patient also has a chronic intermittent asymptomatic rash on face. She reports the rash typically affects her hairline, eyebrows and nasolabial folds and resolves either spontaneously or with mometasone.    PAST MEDICAL & SURGICAL HISTORY:  MS (multiple sclerosis)  HTN (hypertension)      No significant past surgical history          REVIEW OF SYSTEMS:  General: no fevers/chills; +lethargy  Skin/Breast: see HPI  Ophthalmologic: no eye pain or changes in vision  ENT: no dysphagia or changes in hearing  Respiratory: no SOB or cough  Cardiovascular: no palpitations or chest pain  Gastrointestinal: no abdominal pain or blood in stool  Genitourinary: no dysuria or frequency  Musculoskeletal: no joint pains or weakness  Neurological: no weakness, numbness, or tingling    MEDICATIONS  (STANDING):  amitriptyline 50 milliGRAM(s) Oral daily  famotidine    Tablet 20 milliGRAM(s) Oral daily  FIRST- Mouthwash  BLM 10 milliLiter(s) Swish and Spit three times a day  heparin   Injectable 5000 Unit(s) SubCutaneous every 8 hours  heparin  Lock Flush 100 Units/mL Injectable 100 Unit(s) IV Push once  lactated ringers. 1000 milliLiter(s) (50 mL/Hr) IV Continuous <Continuous>  lidocaine 2% Injectable 20 milliLiter(s) Local Injection once  LORazepam     Tablet 0.25 milliGRAM(s) Oral once  potassium chloride    Tablet ER 40 milliEquivalent(s) Oral every 4 hours  pregabalin 75 milliGRAM(s) Oral daily    MEDICATIONS  (PRN):  acetaminophen     Tablet .. 650 milliGRAM(s) Oral every 6 hours PRN Temp greater or equal to 38C (100.4F)  diphenhydrAMINE 25 milliGRAM(s) Oral every 6 hours PRN Rash and/or Itching  ibuprofen  Tablet. 400 milliGRAM(s) Oral every 8 hours PRN Temp greater or equal to 38C (100.4F)  melatonin 3 milliGRAM(s) Oral at bedtime PRN Insomnia  ondansetron Injectable 4 milliGRAM(s) IV Push every 8 hours PRN Nausea and/or Vomiting      Allergies    No Known Allergies    Intolerances        SOCIAL HISTORY: No pertinent social history.    FAMILY HISTORY:  FH: cirrhosis (Mother)  FH: esophageal cancer (Father)    Vital Signs Last 24 Hrs  T(C): 37.3 (31 May 2023 11:57), Max: 37.3 (31 May 2023 11:57)  T(F): 99.1 (31 May 2023 11:57), Max: 99.1 (31 May 2023 11:57)  HR: 104 (31 May 2023 11:57) (102 - 104)  BP: 99/51 (31 May 2023 11:57) (99/51 - 104/57)  BP(mean): --  RR: 17 (31 May 2023 11:57) (17 - 18)  SpO2: 96% (31 May 2023 11:57) (96% - 98%)    Parameters below as of 31 May 2023 11:57  Patient On (Oxygen Delivery Method): room air    PHYSICAL EXAM:  The patient was AOx3 and in NAD.  OP showed no ulcerations.  There was no visible LAD.  Conjunctiva were non-injected.  There was no clubbing or edema of extremities.  The scalp, hair, face, eyebrows, lips, OP, neck, chest, back, extremities x4, and nails were examined.  There was no hyperhidrosis or bromhidrosis.    Of note on skin exam:  Urticarial papules on tip of nose, trunk and extremities  Palmar and plantar erythema  Erythematous plaques with white-yellow scale on hairline, eyebrows and nasolabial folds    LABS:                        10.8   8.02  )-----------( 296      ( 31 May 2023 05:44 )             32.8         132<L>  |  101  |  6<L>  ----------------------------<  90  3.3<L>   |  20<L>  |  0.51    Ca    7.9<L>      31 May 2023 05:44  Phos  2.7       Mg     2.20         TPro  5.6<L>  /  Alb  2.9<L>  /  TBili  0.6  /  DBili  x   /  AST  185<H>  /  ALT  149<H>  /  AlkPhos  84      PT/INR - ( 30 May 2023 07:23 )   PT: 14.4 sec;   INR: 1.24 ratio           Urinalysis Basic - ( 29 May 2023 21:28 )    Color: Yellow / Appearance: Clear / S.048 / pH: x  Gluc: x / Ketone: Negative  / Bili: Negative / Urobili: <2 mg/dL   Blood: x / Protein: 30 mg/dL / Nitrite: Negative   Leuk Esterase: Negative / RBC: 10 /HPF / WBC 6 /HPF   Sq Epi: x / Non Sq Epi: x / Bacteria: Negative

## 2023-05-31 NOTE — PROGRESS NOTE ADULT - PROBLEM SELECTOR PLAN 10
Heparin sq    -Maculopapular rash- benadrul, pepcid, will get derm consult    -Hypokalemia- repleted k    Plan discussed with ACP

## 2023-06-01 PROCEDURE — 99233 SBSQ HOSP IP/OBS HIGH 50: CPT | Mod: GC

## 2023-06-01 PROCEDURE — 99233 SBSQ HOSP IP/OBS HIGH 50: CPT

## 2023-06-01 PROCEDURE — 99232 SBSQ HOSP IP/OBS MODERATE 35: CPT

## 2023-06-01 PROCEDURE — 99223 1ST HOSP IP/OBS HIGH 75: CPT

## 2023-06-01 RX ADMIN — LORATADINE 10 MILLIGRAM(S): 10 TABLET ORAL at 06:39

## 2023-06-01 RX ADMIN — Medication 75 MILLIGRAM(S): at 12:11

## 2023-06-01 RX ADMIN — LORATADINE 10 MILLIGRAM(S): 10 TABLET ORAL at 18:04

## 2023-06-01 RX ADMIN — FAMOTIDINE 20 MILLIGRAM(S): 10 INJECTION INTRAVENOUS at 12:11

## 2023-06-01 RX ADMIN — DIPHENHYDRAMINE HYDROCHLORIDE AND LIDOCAINE HYDROCHLORIDE AND ALUMINUM HYDROXIDE AND MAGNESIUM HYDRO 10 MILLILITER(S): KIT at 06:33

## 2023-06-01 RX ADMIN — Medication 50 MILLIGRAM(S): at 12:11

## 2023-06-01 RX ADMIN — HEPARIN SODIUM 5000 UNIT(S): 5000 INJECTION INTRAVENOUS; SUBCUTANEOUS at 12:12

## 2023-06-01 RX ADMIN — HEPARIN SODIUM 5000 UNIT(S): 5000 INJECTION INTRAVENOUS; SUBCUTANEOUS at 06:42

## 2023-06-01 RX ADMIN — Medication 25 MILLIGRAM(S): at 12:18

## 2023-06-01 RX ADMIN — DIPHENHYDRAMINE HYDROCHLORIDE AND LIDOCAINE HYDROCHLORIDE AND ALUMINUM HYDROXIDE AND MAGNESIUM HYDRO 10 MILLILITER(S): KIT at 12:12

## 2023-06-01 NOTE — CHART NOTE - NSCHARTNOTEFT_GEN_A_CORE
Will plan to perform US guided liver biopsy tomorrow 6/2.  -- NPO after midnight tonight  -- Hold AM anticoagulation on 6/2  -- routine AM labs plus coags (PT/PTT/INR) on 6/2  -- Please write IR pre procedure note  -- Please place IR procedure order under Dr. Stinson  -- Plan discussed with provider Chioma

## 2023-06-01 NOTE — PROGRESS NOTE ADULT - PROBLEM SELECTOR PLAN 1
Sepsis/SIRS present on admission, unclear etiology   Initially thought to be d/t gastroenteritis but diarrhea resolved   -persistent fevers despite antibiotics   -now w/ joint pain 5/28, will need to consider Still's disease and ruling out HLH as well   -blood and urine cultures NEG to date, repeat blood cultures NGTD   -GI PCR neg  -CXR w/ clear lungs   -RVP neg   -CT A/P unremarkable   - CT chest showed No saddle pulmonary embolism or pulmonary embolism within the truncus anterior and interlobar pulmonary arteries. Right basilar 1.1 cm subpleural nodular opacity is indeterminate, but may   represent subsegmental atelectasis. Recommend CT chest follow-up in 3 months to assess for stability/clearing.  -MRI brain showed No acute hemorrhage mass or mass effect. Developmental venous anomaly versus artifact involving the right gregory.  -ESR/CRP high,  RF neg<10,  and anti-citrullinated peptide antibody <8 ( neg)  -ID following  - Rheumatology consulted, likely to be started on high dose steroids, f/u full consult recs Sepsis/SIRS present on admission, unclear etiology   Initially thought to be d/t gastroenteritis but diarrhea resolved   -persistent fevers despite antibiotics   -now w/ joint pain 5/28, will need to consider Still's disease and ruling out HLH as well   -blood and urine cultures NEG to date, repeat blood cultures NGTD   -GI PCR neg  -CXR w/ clear lungs   -RVP neg   -CT A/P unremarkable   - CT chest showed No saddle pulmonary embolism or pulmonary embolism within the truncus anterior and interlobar pulmonary arteries. Right basilar 1.1 cm subpleural nodular opacity is indeterminate, but may   represent subsegmental atelectasis. Recommend CT chest follow-up in 3 months to assess for stability/clearing.  -MRI brain showed No acute hemorrhage mass or mass effect. Developmental venous anomaly versus artifact involving the right gregory.  -ESR/CRP high,  RF neg<10,  and anti-citrullinated peptide antibody <8 ( neg)  -ID following  - Rheumatology consulted given skin rash possible stills dz, likely to be started on high dose steroids, f/u full consult recs

## 2023-06-01 NOTE — CONSULT NOTE ADULT - ATTENDING COMMENTS
pt seen and examined by me personally  agree w/ above   will follow w/ you for data and steroid response

## 2023-06-01 NOTE — PROVIDER CONTACT NOTE (OTHER) - DATE AND TIME:
21-May-2023 21:44
22-May-2023 15:27
01-Jun-2023 16:36
20-May-2023 03:18
26-May-2023 05:21
27-May-2023 08:20
27-May-2023 11:37
01-Jun-2023 11:30
01-Jun-2023 14:50
29-May-2023 12:47
20-May-2023 13:45
22-May-2023 00:30
28-May-2023 10:20
25-May-2023 15:05

## 2023-06-01 NOTE — PROGRESS NOTE ADULT - SUBJECTIVE AND OBJECTIVE BOX
Patient is a 58y old  Female who presents with a chief complaint of Fever, nausea, vomiting (01 Jun 2023 10:09)      SUBJECTIVE / OVERNIGHT EVENTS:  Pt seen and examined at 11:15am, no overnight events, still with rash and itching, has poor appetite, reports having rt hip "sciatica pain", no other new issues reported.    MEDICATIONS  (STANDING):  amitriptyline 50 milliGRAM(s) Oral daily  famotidine    Tablet 20 milliGRAM(s) Oral daily  FIRST- Mouthwash  BLM 10 milliLiter(s) Swish and Spit three times a day  heparin   Injectable 5000 Unit(s) SubCutaneous every 8 hours  heparin  Lock Flush 100 Units/mL Injectable 100 Unit(s) IV Push once  lactated ringers. 1000 milliLiter(s) (50 mL/Hr) IV Continuous <Continuous>  lidocaine 2% Injectable 20 milliLiter(s) Local Injection once  loratadine 10 milliGRAM(s) Oral two times a day  potassium chloride    Tablet ER 40 milliEquivalent(s) Oral every 4 hours    MEDICATIONS  (PRN):  acetaminophen     Tablet .. 650 milliGRAM(s) Oral every 6 hours PRN Temp greater or equal to 38C (100.4F)  diphenhydrAMINE 25 milliGRAM(s) Oral every 6 hours PRN Rash and/or Itching  ibuprofen  Tablet. 400 milliGRAM(s) Oral every 8 hours PRN Temp greater or equal to 38C (100.4F)  melatonin 3 milliGRAM(s) Oral at bedtime PRN Insomnia  ondansetron Injectable 4 milliGRAM(s) IV Push every 8 hours PRN Nausea and/or Vomiting      Vital Signs Last 24 Hrs  T(C): 37.1 (01 Jun 2023 10:23), Max: 37.6 (31 May 2023 21:15)  T(F): 98.8 (01 Jun 2023 10:23), Max: 99.7 (31 May 2023 21:15)  HR: 106 (01 Jun 2023 10:23) (102 - 108)  BP: 96/50 (01 Jun 2023 10:23) (94/57 - 109/44)  BP(mean): --  RR: 16 (01 Jun 2023 10:23) (16 - 16)  SpO2: 99% (01 Jun 2023 10:23) (96% - 99%)    Parameters below as of 01 Jun 2023 10:23  Patient On (Oxygen Delivery Method): room air      CAPILLARY BLOOD GLUCOSE        I&O's Summary    31 May 2023 07:01  -  01 Jun 2023 07:00  --------------------------------------------------------  IN: 250 mL / OUT: 0 mL / NET: 250 mL        PHYSICAL EXAM:  GENERAL: NAD, well-developed  CHEST/LUNG: Clear to auscultation bilaterally; No wheeze  HEART: Tachy to 104/mt  ABDOMEN: Soft, Nontender, Nondistended  EXTREMITIES: no LE edema  PSYCH: Calm  NEUROLOGY: AAOx3  SKIN: diffuse Maculopapular rash on the legs, chest, abdomen, face      LABS:                        10.8   8.02  )-----------( 296      ( 31 May 2023 05:44 )             32.8     05-31    132<L>  |  101  |  6<L>  ----------------------------<  90  3.3<L>   |  20<L>  |  0.51    Ca    7.9<L>      31 May 2023 05:44  Phos  2.7     05-31  Mg     2.20     05-31                RADIOLOGY & ADDITIONAL TESTS:  < from: MR Head w/wo IV Cont (05.31.23 @ 18:26) >  MR BRAIN     < end of copied text >  < from: MR Head w/wo IV Cont (05.31.23 @ 18:26) >  No acute hemorrhage mass or mass effect    Developmental venous anomaly versus artifact involving the right gregory.    < end of copied text >    Imaging Personally Reviewed:    Consultant(s) Notes Reviewed:      Care Discussed with Consultants/Other Providers:

## 2023-06-01 NOTE — CHART NOTE - NSCHARTNOTEFT_GEN_A_CORE
59 yo Female with MHx of HTN, MS on Ocrevus, admitted for N/V and fevers x 3 weeks.   Hematology team consulted for fevers, pancytopenia and elevated ferritin, concerning for HLH.   Further lab work with Tg 205 and elevated fibrinogen. IL2-R elevated>3K   BM Biopsy did not show definitive  morphologic or immunophenotypic evidence of hemophagocytosis. PB flow WNL.   Patient's with improved CBC, today: WBC and PLT wnl. Hb mildly low. Ferritin down trending with no treatment, 5K on 05/28.   Case discussed on tumor board today, in view of clinical improvement and no findings on BM, patient is unlikely to have HLH. There is no need to follow up with hematology. Likely to have inflammatory process.     Case discussed with the attending, Dr. Marks.     Hematology team will sign off.   Thank you for your consult,   Nina Eldridge MD.   PGY4 HEMONC fellow

## 2023-06-01 NOTE — PROGRESS NOTE ADULT - SUBJECTIVE AND OBJECTIVE BOX
Follow Up:      Inverval History/ROS:Patient is a 58y old  Female who presents with a chief complaint of Fever, nausea, vomiting (01 Jun 2023 12:47)    No fever  + itchy rash    Allergies    No Known Allergies    Intolerances        ANTIMICROBIALS:      OTHER MEDS:  acetaminophen     Tablet .. 650 milliGRAM(s) Oral every 6 hours PRN  amitriptyline 50 milliGRAM(s) Oral daily  diphenhydrAMINE 25 milliGRAM(s) Oral every 6 hours PRN  famotidine    Tablet 20 milliGRAM(s) Oral daily  FIRST- Mouthwash  BLM 10 milliLiter(s) Swish and Spit three times a day  heparin   Injectable 5000 Unit(s) SubCutaneous every 8 hours  heparin  Lock Flush 100 Units/mL Injectable 100 Unit(s) IV Push once  ibuprofen  Tablet. 400 milliGRAM(s) Oral every 8 hours PRN  lactated ringers. 1000 milliLiter(s) IV Continuous <Continuous>  lidocaine 2% Injectable 20 milliLiter(s) Local Injection once  loratadine 10 milliGRAM(s) Oral two times a day  melatonin 3 milliGRAM(s) Oral at bedtime PRN  ondansetron Injectable 4 milliGRAM(s) IV Push every 8 hours PRN  potassium chloride    Tablet ER 40 milliEquivalent(s) Oral every 4 hours      Vital Signs Last 24 Hrs  T(C): 37.1 (01 Jun 2023 10:23), Max: 37.6 (31 May 2023 21:15)  T(F): 98.8 (01 Jun 2023 10:23), Max: 99.7 (31 May 2023 21:15)  HR: 106 (01 Jun 2023 10:23) (102 - 108)  BP: 96/50 (01 Jun 2023 10:23) (94/57 - 109/44)  BP(mean): --  RR: 16 (01 Jun 2023 10:23) (16 - 16)  SpO2: 99% (01 Jun 2023 10:23) (96% - 99%)    Parameters below as of 01 Jun 2023 10:23  Patient On (Oxygen Delivery Method): room air        PHYSICAL EXAM:  General: [ x] non-toxic  HEAD/EYES: [ ] PERRL [ x] white sclera [ ] icterus  ENT:  [ ] normal [ ] supple [ ] thrush [ ] pharyngeal exudate  Cardiovascular:   [ ] murmur [x ] normal [ ] PPM/AICD  Respiratory:  [x ] clear to ausculation bilaterally  GI:  [x ] soft, non-tender, normal bowel sounds  :  [ ] chadwick [ x] no CVA tenderness   Musculoskeletal:  [ ] no synovitis  Neurologic:  [ ] non-focal exam   Skin:  [ x] + rash  Lymph: [x ] no lymphadenopathy  Psychiatric:  [ ] appropriate affect [x ] alert & oriented  Lines:  [x ] no phlebitis [ ] central line                                10.8   8.02  )-----------( 296      ( 31 May 2023 05:44 )             32.8       05-31    132<L>  |  101  |  6<L>  ----------------------------<  90  3.3<L>   |  20<L>  |  0.51    Ca    7.9<L>      31 May 2023 05:44  Phos  2.7     05-31  Mg     2.20     05-31            MICROBIOLOGY:Culture Results:   No growth to date. (05-28-23 @ 01:55)  Culture Results:   No growth to date. (05-27-23 @ 23:36)      RADIOLOGY:

## 2023-06-01 NOTE — CONSULT NOTE ADULT - CONSULT REQUESTED DATE/TIME
26-May-2023 12:09
22-May-2023 14:38
31-May-2023 13:13
24-May-2023
01-Jun-2023 09:36
20-May-2023 17:05

## 2023-06-01 NOTE — PROGRESS NOTE ADULT - PROBLEM SELECTOR PLAN 3
Worsening chronic transaminitis  Reports elevated liver enzymes in "200s" since February, which were investigated by her gastroenterologist   -APAP level neg.  -Abd US showing fatty liver  -EBV Ag was positive and likely old infection, EBV PCR negative   -f/up alpha-1 anti-trypsin (phenotype) 212, AFP 9.4   -MAYELA neg, anti-smooth muscle antibody titer high   -IR consulted for liver biopsy, will hold off for now d/t persistent fevers   -Appreciate hepatology recs Worsening chronic transaminitis  Reports elevated liver enzymes in "200s" since February, which were investigated by her gastroenterologist   -APAP level neg.  -Abd US showing fatty liver  -EBV Ag was positive and likely old infection, EBV PCR negative   - alpha-1 anti-trypsin (phenotype) 212, AFP 9.4   -MAYELA neg, anti-smooth muscle antibody titer high   -IR consulted for liver biopsy, will hold off for now d/t persistent fevers   -Appreciate hepatology recs

## 2023-06-01 NOTE — CHART NOTE - NSCHARTNOTEFT_GEN_A_CORE
Discussed with rheumatology and medicine attending, concerned for possible Stills disease given her symptoms, so would like to start her on steroids. Ideally, patient will need liver biopsy prior to steroid initiation.     Recommendations:   - If possible, please obtain liver biopsy prior to steroids initiation.   - If unable to obtain biopsy this week, can consider starting steroids, if necessary, recommend trending the liver enzymes in this case.   - Stills disease can also potentially cause liver enzyme elevations, however, there is usually modest elevations in liver enzymes which does not explain her high values of AS/ALT on admission. Very few cases of fulminant liver failure have been reported. If its related to Stills disease, would see improvement in the liver enzymes w/ treatment.     Discussed the case with Dr. Torres.     Milton Griffith, PGY-4  Gastroenterology/Hepatology Fellow  Available on [employer] Teams  [phone] (Short Range Pager)  [phone] (Long Range Pager)    After 5pm, please contact the on-call GI fellow. [phone]

## 2023-06-01 NOTE — CHART NOTE - NSCHARTNOTEFT_GEN_A_CORE
IR Pre-Procedure Note    Patient Age:   58y    Patient Gender:   Female    Procedure (including site / side if known): Liver Biopsy     Diagnosis / Indication: Patient is a 58y old  Female who presents with a chief complaint of Fever, nausea, vomiting (01 Jun 2023 15:50)      Interventional Radiology Attending Physician: Dr. Stinson    Ordering Attending Physician:    PAST MEDICAL & SURGICAL HISTORY:  MS (multiple sclerosis)      HTN (hypertension)      No significant past surgical history           Pertinent Labs:   CBC Full  -  ( 31 May 2023 05:44 )  WBC Count : 8.02 K/uL  RBC Count : 3.41 M/uL  Hemoglobin : 10.8 g/dL  Hematocrit : 32.8 %  Platelet Count - Automated : 296 K/uL  Mean Cell Volume : 96.2 fL  Mean Cell Hemoglobin : 31.7 pg  Mean Cell Hemoglobin Concentration : 32.9 gm/dL  Auto Neutrophil # : 6.38 K/uL  Auto Lymphocyte # : 1.21 K/uL  Auto Monocyte # : 0.43 K/uL  Auto Eosinophil # : 0.00 K/uL  Auto Basophil # : 0.00 K/uL  Auto Neutrophil % : 69.9 %  Auto Lymphocyte % : 15.1 %  Auto Monocyte % : 5.3 %  Auto Eosinophil % : 0.0 %  Auto Basophil % : 0.0 %    05-31    132<L>  |  101  |  6<L>  ----------------------------<  90  3.3<L>   |  20<L>  |  0.51    Ca    7.9<L>      31 May 2023 05:44  Phos  2.7     05-31  Mg     2.20     05-31          Patient / Family aware of procedure:   [  ] Y   [  ] N

## 2023-06-01 NOTE — PROGRESS NOTE ADULT - PROBLEM SELECTOR PLAN 10
Heparin sq    -Maculopapular rash- benadrul, pepcid, will get derm consult    -Hypokalemia- repleted k    Plan discussed with ACP Heparin sq    Plan discussed with ACP Heparin sq  f/u today's labs  Plan discussed with ACP

## 2023-06-01 NOTE — PROVIDER CONTACT NOTE (OTHER) - REASON
patient blood pressure 96/48
[address] [facility] 100.4 oral temp
[address] [facility] oral temp 99 hour after giving tylenol
Low BP
hypotension
BP
HR
Low BP
Refusing labs
refusing tele monitor
temperature 100.4
Patient temp 100.8
523 [facility] oral temp 100.1
HR above parameter

## 2023-06-01 NOTE — CONSULT NOTE ADULT - CONSULT REASON
Further help for fever of unknown origin
Concern for HLH
fever
liver biopsy
Rash
Elevated liver enzymes

## 2023-06-01 NOTE — PROGRESS NOTE ADULT - ASSESSMENT
==========INCOMPLETE==========  note/recommendations are not complete until attending signature/attestation    Differential includes Still’s disease (given fever of unknown origin) vs urticaria (possibly drug-related).  - Appreciate rheumatology recommendations  - C/w loratadine 10mg PO BID and diphenhydramine q6h PRN itch, would hold for oversedation    The patient's chart was reviewed in addition to being seen and examined at bedside with the dermatology attending.  Recommendations were communicated with the primary team.  Please page [phone] with a 10-digit call-back number for further related questions.    Terry Doe MD  Resident Physician, PGY-3  [facility] Dermatology  Pager: [phone]  Office: [phone]   ==========INCOMPLETE==========  note/recommendations are not complete until attending signature/attestation    Differential includes Still’s disease (given fever of unknown origin) vs urticaria (possibly drug-related).  - Appreciate rheumatology recommendations  - C/w loratadine 10mg PO BID and diphenhydramine q6h PRN itch, would hold for oversedation    #Seborrheic dermatitis of the face/scalp  Seborrheic dermatitis is a common inflammatory papulosquamous condition that affects the sebum-rich areas of the body, including the face, scalp, neck, upper chest, and back. The pathogenesis is not known with certainty, but it may be related to an abnormal immune response to Pityrosporum (Malassezia) yeast, a common skin commensal.  - recommend to the face: hydrocortisone 2.5% cream BID to affected areas for up to 2 weeks on, then 1 week off. SED  - recommend ketoconazole 2% cream BID to affected areas.    The patient's chart was reviewed in addition to being seen and examined at bedside with the dermatology attending.  Recommendations were communicated with the primary team.  Please page [phone] with a 10-digit call-back number for further related questions.    Terry Doe MD  Resident Physician, PGY-3  [facility] Dermatology  Pager: [phone]  Office: [phone]   Differential includes Still’s disease (given fever of unknown origin) vs routine urticaria (possibly drug-related) -- improved today  - Appreciate rheumatology recommendations  - No objection to high-dose steroids from a derm standpoint. Suggest 0.75-1 mg/kg prednisone dosing starting with 60 mg PO prednisone daily -- but defer steroid recommendations to rheumatology  - Will confirm that systemic steroids will not disrupt potential liver biopsy plans  - C/w loratadine 10mg PO BID and diphenhydramine q6h PRN itch, would hold for oversedation    #Seborrheic dermatitis of the face/scalp  Seborrheic dermatitis is a common inflammatory papulosquamous condition that affects the sebum-rich areas of the body, including the face, scalp, neck, upper chest, and back. The pathogenesis is not known with certainty, but it may be related to an abnormal immune response to Pityrosporum (Malassezia) yeast, a common skin commensal.    The patient's chart was reviewed in addition to being seen and examined at bedside with the dermatology attending.  Recommendations were communicated with the primary team.  Please page [phone] with a 10-digit call-back number for further related questions.    Terry Doe MD  Resident Physician, PGY-3  [facility] Dermatology  Pager: [phone]  Office: [phone]   Differential includes Still’s disease (given fever of unknown origin) vs routine urticaria -- improved today  - Appreciate rheumatology recommendations  - No objection to high-dose steroids from a derm standpoint. Suggest 0.75-1 mg/kg prednisone dosing starting with 60 mg PO prednisone daily -- but defer steroid recommendations to rheumatology  - Will confirm that systemic steroids will not disrupt potential liver biopsy plans  - C/w loratadine 10mg PO BID and diphenhydramine q6h PRN itch, would hold for oversedation    #Seborrheic dermatitis of the face/scalp  Seborrheic dermatitis is a common inflammatory papulosquamous condition that affects the sebum-rich areas of the body, including the face, scalp, neck, upper chest, and back. The pathogenesis is not known with certainty, but it may be related to an abnormal immune response to Pityrosporum (Malassezia) yeast, a common skin commensal.  - recommend sending desmoglein serum antigens    The patient's chart was reviewed in addition to being seen and examined at bedside with the dermatology attending.  Recommendations were communicated with the primary team.  Please page [phone] with a 10-digit call-back number for further related questions.    Terry Doe MD  Resident Physician, PGY-3  [facility] Dermatology  Pager: [phone]  Office: [phone]

## 2023-06-01 NOTE — PROVIDER CONTACT NOTE (OTHER) - ASSESSMENT
Pt AOx4, stable at this time.
temperature 100.4 and vital signs as per documented   pt asymptomatic resting in bed
vitals signs as documented and BP 92/52  pt asymptomatic resting in bed
Pt AOx4, stable.
Pt stable at this time.
bp 115/63  O2 98% Temp 100.4 oral
no distress noted. patient endorses chills
oral temp 100.1
, /61, temp 99.9, O2 Sat 98 on 2L NC. Pt is asymptomatic.
BP 92/52, HR 82, temp 97.8 oral. patient denies dizziness, denies SOB. fluids running NS @50cc/hour.
 and vital signs as documented   pt asymptomatic resting in bed   pt refusing tele monitor   education provided on importance and indication   pt verbalizes understanding
pt refusing tele monitor   education provided on indication and importance   pt verbalizes understanding
Patient was asymptomatic
oral temp 99

## 2023-06-01 NOTE — PROGRESS NOTE ADULT - PROBLEM SELECTOR PLAN 2
Elevated Ferritin w/ bicytopenia, elevated LFT's, and persistent fevers. Ruling out HLH   -Ferritin 18720,   -Ferritin trending down w/o any intervention   -triglyceride levels 205, Fibrinogen wnl   -s/p bone marrow biopsy 5/25 showed -The plasma cell immunophenotype is unremarkable with polyclonality.  - Elevated Ferritin w/ bicytopenia, elevated LFT's, and persistent fevers. Ruling out HLH   -Ferritin 53972,   -Ferritin trending down w/o any intervention   -triglyceride levels 205, Fibrinogen wnl   -s/p bone marrow biopsy 5/25 showed -The plasma cell immunophenotype is unremarkable with polyclonality.

## 2023-06-01 NOTE — CONSULT NOTE ADULT - REASON FOR ADMISSION
Fever, nausea, vomiting

## 2023-06-01 NOTE — PROGRESS NOTE ADULT - SUBJECTIVE AND OBJECTIVE BOX
INTERVAL HPI/OVERNIGHT EVENTS:    S:  Patient is a 58y Female Patient is a 58y old  Female who presents with a chief complaint of Fever, nausea, vomiting (01 Jun 2023 09:35)      **No overnight events.**    ________________________________    REVIEW OF SYSTEMS      General: no fevers/chills, no NS	    Skin: see HPI  	  Ophthalmologic: no eye pain or change in vision    Genitourinary: no dysuria or hematuria    Musculoskeletal: no joint pains or weakness	    Neurological:no weakness or tingling        ROS negative except if noted in the above subjective text. All other systems reviewed and negative.    MEDICATIONS  (STANDING):  amitriptyline 50 milliGRAM(s) Oral daily  famotidine    Tablet 20 milliGRAM(s) Oral daily  FIRST- Mouthwash  BLM 10 milliLiter(s) Swish and Spit three times a day  heparin   Injectable 5000 Unit(s) SubCutaneous every 8 hours  heparin  Lock Flush 100 Units/mL Injectable 100 Unit(s) IV Push once  lactated ringers. 1000 milliLiter(s) (50 mL/Hr) IV Continuous <Continuous>  lidocaine 2% Injectable 20 milliLiter(s) Local Injection once  loratadine 10 milliGRAM(s) Oral two times a day  potassium chloride    Tablet ER 40 milliEquivalent(s) Oral every 4 hours  pregabalin 75 milliGRAM(s) Oral daily    MEDICATIONS  (PRN):  acetaminophen     Tablet .. 650 milliGRAM(s) Oral every 6 hours PRN Temp greater or equal to 38C (100.4F)  diphenhydrAMINE 25 milliGRAM(s) Oral every 6 hours PRN Rash and/or Itching  ibuprofen  Tablet. 400 milliGRAM(s) Oral every 8 hours PRN Temp greater or equal to 38C (100.4F)  melatonin 3 milliGRAM(s) Oral at bedtime PRN Insomnia  ondansetron Injectable 4 milliGRAM(s) IV Push every 8 hours PRN Nausea and/or Vomiting      Allergies    No Known Allergies    Intolerances          O: ICU Vital Signs Last 24 Hrs  T(C): 37.2 (01 Jun 2023 06:35), Max: 37.6 (31 May 2023 21:15)  T(F): 99 (01 Jun 2023 06:35), Max: 99.7 (31 May 2023 21:15)  HR: 108 (01 Jun 2023 06:35) (102 - 108)  BP: 94/57 (01 Jun 2023 06:35) (94/57 - 109/44)  BP(mean): --  ABP: --  ABP(mean): --  RR: 16 (01 Jun 2023 06:35) (16 - 17)  SpO2: 98% (01 Jun 2023 06:35) (96% - 98%)    O2 Parameters below as of 01 Jun 2023 06:35  Patient On (Oxygen Delivery Method): room air          I&O's Detail    31 May 2023 07:01  -  01 Jun 2023 07:00  --------------------------------------------------------  IN:    Oral Fluid: 250 mL  Total IN: 250 mL    OUT:  Total OUT: 0 mL    Total NET: 250 mL      PHYSICAL EXAM:     The patient was alert and oriented X 3, well nourished, and in no  apparent distress.  OP showed no ulcerations  There was no visible lymphadenopathy.  Conjunctiva were non injected  There was no clubbing or edema of extremities.  The scalp, hair, face, eyebrows, lips, OP, neck, chest, back,   extremities X 4, nails were examined.  There was no hyperhidrosis or bromhidrosis.      Of note on skin exam:  Urticarial papules on tip of nose, trunk and extremities  Palmar and plantar erythema  Erythematous plaques with white-yellow scale on hairline, eyebrows and nasolabial folds        Labs:                       10.8   8.02  )-----------( 296      ( 31 May 2023 05:44 )             32.8     CBC Full  -  ( 31 May 2023 05:44 )  WBC Count : 8.02 K/uL  RBC Count : 3.41 M/uL  Hemoglobin : 10.8 g/dL  Hematocrit : 32.8 %  Platelet Count - Automated : 296 K/uL  Mean Cell Volume : 96.2 fL  Mean Cell Hemoglobin : 31.7 pg  Mean Cell Hemoglobin Concentration : 32.9 gm/dL  Auto Neutrophil # : 6.38 K/uL  Auto Lymphocyte # : 1.21 K/uL  Auto Monocyte # : 0.43 K/uL  Auto Eosinophil # : 0.00 K/uL  Auto Basophil # : 0.00 K/uL  Auto Neutrophil % : 69.9 %  Auto Lymphocyte % : 15.1 %  Auto Monocyte % : 5.3 %  Auto Eosinophil % : 0.0 %  Auto Basophil % : 0.0 %    05-31    132<L>  |  101  |  6<L>  ----------------------------<  90  3.3<L>   |  20<L>  |  0.51    Ca    7.9<L>      31 May 2023 05:44  Phos  2.7     05-31  Mg     2.20     05-31          CAPILLARY BLOOD GLUCOSE   INTERVAL HPI/OVERNIGHT EVENTS:    S:  Patient is a 58y Female Patient is a 58y old  Female who presents with a chief complaint of Fever, nausea, vomiting (01 Jun 2023 09:35)      **No overnight events.**    Still with rash on her arms, legs, and face that is irritated and itchy.     REVIEW OF SYSTEMS      General: no fevers/chills, no NS	    Skin: see HPI  	  Ophthalmologic: no eye pain or change in vision    Genitourinary: no dysuria or hematuria    Musculoskeletal: no joint pains or weakness	    Neurological:no weakness or tingling        ROS negative except if noted in the above subjective text. All other systems reviewed and negative.    MEDICATIONS  (STANDING):  amitriptyline 50 milliGRAM(s) Oral daily  famotidine    Tablet 20 milliGRAM(s) Oral daily  FIRST- Mouthwash  BLM 10 milliLiter(s) Swish and Spit three times a day  heparin   Injectable 5000 Unit(s) SubCutaneous every 8 hours  heparin  Lock Flush 100 Units/mL Injectable 100 Unit(s) IV Push once  lactated ringers. 1000 milliLiter(s) (50 mL/Hr) IV Continuous <Continuous>  lidocaine 2% Injectable 20 milliLiter(s) Local Injection once  loratadine 10 milliGRAM(s) Oral two times a day  potassium chloride    Tablet ER 40 milliEquivalent(s) Oral every 4 hours  pregabalin 75 milliGRAM(s) Oral daily    MEDICATIONS  (PRN):  acetaminophen     Tablet .. 650 milliGRAM(s) Oral every 6 hours PRN Temp greater or equal to 38C (100.4F)  diphenhydrAMINE 25 milliGRAM(s) Oral every 6 hours PRN Rash and/or Itching  ibuprofen  Tablet. 400 milliGRAM(s) Oral every 8 hours PRN Temp greater or equal to 38C (100.4F)  melatonin 3 milliGRAM(s) Oral at bedtime PRN Insomnia  ondansetron Injectable 4 milliGRAM(s) IV Push every 8 hours PRN Nausea and/or Vomiting      Allergies    No Known Allergies    Intolerances          O: ICU Vital Signs Last 24 Hrs  T(C): 37.2 (01 Jun 2023 06:35), Max: 37.6 (31 May 2023 21:15)  T(F): 99 (01 Jun 2023 06:35), Max: 99.7 (31 May 2023 21:15)  HR: 108 (01 Jun 2023 06:35) (102 - 108)  BP: 94/57 (01 Jun 2023 06:35) (94/57 - 109/44)  BP(mean): --  ABP: --  ABP(mean): --  RR: 16 (01 Jun 2023 06:35) (16 - 17)  SpO2: 98% (01 Jun 2023 06:35) (96% - 98%)    O2 Parameters below as of 01 Jun 2023 06:35  Patient On (Oxygen Delivery Method): room air          I&O's Detail    31 May 2023 07:01  -  01 Jun 2023 07:00  --------------------------------------------------------  IN:    Oral Fluid: 250 mL  Total IN: 250 mL    OUT:  Total OUT: 0 mL    Total NET: 250 mL      PHYSICAL EXAM:     The patient was alert and oriented X 3, well nourished, and in no  apparent distress.  OP showed no ulcerations  There was no visible lymphadenopathy.  Conjunctiva were non injected  There was no clubbing or edema of extremities.  The scalp, hair, face, eyebrows, lips, OP, neck, chest, back,   extremities X 4, nails were examined.  There was no hyperhidrosis or bromhidrosis.      Of note on skin exam:  Scaly red patches on the eyebrows, NLFs, and frontal scalp  urticarial papules and plaques on the trunk and extremities        Labs:                       10.8   8.02  )-----------( 296      ( 31 May 2023 05:44 )             32.8     CBC Full  -  ( 31 May 2023 05:44 )  WBC Count : 8.02 K/uL  RBC Count : 3.41 M/uL  Hemoglobin : 10.8 g/dL  Hematocrit : 32.8 %  Platelet Count - Automated : 296 K/uL  Mean Cell Volume : 96.2 fL  Mean Cell Hemoglobin : 31.7 pg  Mean Cell Hemoglobin Concentration : 32.9 gm/dL  Auto Neutrophil # : 6.38 K/uL  Auto Lymphocyte # : 1.21 K/uL  Auto Monocyte # : 0.43 K/uL  Auto Eosinophil # : 0.00 K/uL  Auto Basophil # : 0.00 K/uL  Auto Neutrophil % : 69.9 %  Auto Lymphocyte % : 15.1 %  Auto Monocyte % : 5.3 %  Auto Eosinophil % : 0.0 %  Auto Basophil % : 0.0 %    05-31    132<L>  |  101  |  6<L>  ----------------------------<  90  3.3<L>   |  20<L>  |  0.51    Ca    7.9<L>      31 May 2023 05:44  Phos  2.7     05-31  Mg     2.20     05-31          CAPILLARY BLOOD GLUCOSE   INTERVAL HPI/OVERNIGHT EVENTS:    S:  Patient is a 58y Female Patient is a 58y old  Female who presents with a chief complaint of Fever, nausea, vomiting (01 Jun 2023 09:35)    **No overnight events.**    Still with rash on her arms, legs, and face that is irritated and itchy.     REVIEW OF SYSTEMS      General: no fevers/chills, no NS	    Skin: see HPI  	  Ophthalmologic: no eye pain or change in vision    Genitourinary: no dysuria or hematuria    Musculoskeletal: no joint pains or weakness	    Neurological:no weakness or tingling        ROS negative except if noted in the above subjective text. All other systems reviewed and negative.    MEDICATIONS  (STANDING):  amitriptyline 50 milliGRAM(s) Oral daily  famotidine    Tablet 20 milliGRAM(s) Oral daily  FIRST- Mouthwash  BLM 10 milliLiter(s) Swish and Spit three times a day  heparin   Injectable 5000 Unit(s) SubCutaneous every 8 hours  heparin  Lock Flush 100 Units/mL Injectable 100 Unit(s) IV Push once  lactated ringers. 1000 milliLiter(s) (50 mL/Hr) IV Continuous <Continuous>  lidocaine 2% Injectable 20 milliLiter(s) Local Injection once  loratadine 10 milliGRAM(s) Oral two times a day  potassium chloride    Tablet ER 40 milliEquivalent(s) Oral every 4 hours  pregabalin 75 milliGRAM(s) Oral daily    MEDICATIONS  (PRN):  acetaminophen     Tablet .. 650 milliGRAM(s) Oral every 6 hours PRN Temp greater or equal to 38C (100.4F)  diphenhydrAMINE 25 milliGRAM(s) Oral every 6 hours PRN Rash and/or Itching  ibuprofen  Tablet. 400 milliGRAM(s) Oral every 8 hours PRN Temp greater or equal to 38C (100.4F)  melatonin 3 milliGRAM(s) Oral at bedtime PRN Insomnia  ondansetron Injectable 4 milliGRAM(s) IV Push every 8 hours PRN Nausea and/or Vomiting      Allergies    No Known Allergies    Intolerances      O: ICU Vital Signs Last 24 Hrs  T(C): 37.2 (01 Jun 2023 06:35), Max: 37.6 (31 May 2023 21:15)  T(F): 99 (01 Jun 2023 06:35), Max: 99.7 (31 May 2023 21:15)  HR: 108 (01 Jun 2023 06:35) (102 - 108)  BP: 94/57 (01 Jun 2023 06:35) (94/57 - 109/44)  BP(mean): --  ABP: --  ABP(mean): --  RR: 16 (01 Jun 2023 06:35) (16 - 17)  SpO2: 98% (01 Jun 2023 06:35) (96% - 98%)    O2 Parameters below as of 01 Jun 2023 06:35  Patient On (Oxygen Delivery Method): room air          I&O's Detail    31 May 2023 07:01  -  01 Jun 2023 07:00  --------------------------------------------------------  IN:    Oral Fluid: 250 mL  Total IN: 250 mL    OUT:  Total OUT: 0 mL    Total NET: 250 mL      PHYSICAL EXAM:     The patient was alert and oriented X 3, well nourished, and in no  apparent distress.  OP showed no ulcerations  There was no visible lymphadenopathy.  Conjunctiva were non injected  There was no clubbing or edema of extremities.  The scalp, hair, face, eyebrows, lips, OP, neck, chest, back,   extremities X 4, nails were examined.  There was no hyperhidrosis or bromhidrosis.      Of note on skin exam:  Scaly red patches on the eyebrows, NLFs, and frontal scalp  urticarial papules and plaques on the trunk and extremities with many islands of sparing        Labs:                       10.8   8.02  )-----------( 296      ( 31 May 2023 05:44 )             32.8     CBC Full  -  ( 31 May 2023 05:44 )  WBC Count : 8.02 K/uL  RBC Count : 3.41 M/uL  Hemoglobin : 10.8 g/dL  Hematocrit : 32.8 %  Platelet Count - Automated : 296 K/uL  Mean Cell Volume : 96.2 fL  Mean Cell Hemoglobin : 31.7 pg  Mean Cell Hemoglobin Concentration : 32.9 gm/dL  Auto Neutrophil # : 6.38 K/uL  Auto Lymphocyte # : 1.21 K/uL  Auto Monocyte # : 0.43 K/uL  Auto Eosinophil # : 0.00 K/uL  Auto Basophil # : 0.00 K/uL  Auto Neutrophil % : 69.9 %  Auto Lymphocyte % : 15.1 %  Auto Monocyte % : 5.3 %  Auto Eosinophil % : 0.0 %  Auto Basophil % : 0.0 %    05-31    132<L>  |  101  |  6<L>  ----------------------------<  90  3.3<L>   |  20<L>  |  0.51    Ca    7.9<L>      31 May 2023 05:44  Phos  2.7     05-31  Mg     2.20     05-31          CAPILLARY BLOOD GLUCOSE   INTERVAL HPI/OVERNIGHT EVENTS:    S:  Patient is a 58y Female Patient is a 58y old  Female who presents with a chief complaint of Fever, nausea, vomiting (01 Jun 2023 09:35)    **No overnight events.**    Still with rash on her arms, legs, and face that is irritated and itchy.     REVIEW OF SYSTEMS      General: no fevers/chills, no NS	    Skin: see HPI  	  Ophthalmologic: no eye pain or change in vision    Genitourinary: no dysuria or hematuria    Musculoskeletal: no joint pains or weakness	    Neurological:no weakness or tingling        ROS negative except if noted in the above subjective text. All other systems reviewed and negative.    MEDICATIONS  (STANDING):  amitriptyline 50 milliGRAM(s) Oral daily  famotidine    Tablet 20 milliGRAM(s) Oral daily  FIRST- Mouthwash  BLM 10 milliLiter(s) Swish and Spit three times a day  heparin   Injectable 5000 Unit(s) SubCutaneous every 8 hours  heparin  Lock Flush 100 Units/mL Injectable 100 Unit(s) IV Push once  lactated ringers. 1000 milliLiter(s) (50 mL/Hr) IV Continuous <Continuous>  lidocaine 2% Injectable 20 milliLiter(s) Local Injection once  loratadine 10 milliGRAM(s) Oral two times a day  potassium chloride    Tablet ER 40 milliEquivalent(s) Oral every 4 hours  pregabalin 75 milliGRAM(s) Oral daily    MEDICATIONS  (PRN):  acetaminophen     Tablet .. 650 milliGRAM(s) Oral every 6 hours PRN Temp greater or equal to 38C (100.4F)  diphenhydrAMINE 25 milliGRAM(s) Oral every 6 hours PRN Rash and/or Itching  ibuprofen  Tablet. 400 milliGRAM(s) Oral every 8 hours PRN Temp greater or equal to 38C (100.4F)  melatonin 3 milliGRAM(s) Oral at bedtime PRN Insomnia  ondansetron Injectable 4 milliGRAM(s) IV Push every 8 hours PRN Nausea and/or Vomiting      Allergies    No Known Allergies    Intolerances      O: ICU Vital Signs Last 24 Hrs  T(C): 37.2 (01 Jun 2023 06:35), Max: 37.6 (31 May 2023 21:15)  T(F): 99 (01 Jun 2023 06:35), Max: 99.7 (31 May 2023 21:15)  HR: 108 (01 Jun 2023 06:35) (102 - 108)  BP: 94/57 (01 Jun 2023 06:35) (94/57 - 109/44)  BP(mean): --  ABP: --  ABP(mean): --  RR: 16 (01 Jun 2023 06:35) (16 - 17)  SpO2: 98% (01 Jun 2023 06:35) (96% - 98%)    O2 Parameters below as of 01 Jun 2023 06:35  Patient On (Oxygen Delivery Method): room air          I&O's Detail    31 May 2023 07:01  -  01 Jun 2023 07:00  --------------------------------------------------------  IN:    Oral Fluid: 250 mL  Total IN: 250 mL    OUT:  Total OUT: 0 mL    Total NET: 250 mL      PHYSICAL EXAM:     The patient was alert and oriented X 3, well nourished, and in no  apparent distress.  OP showed no ulcerations  There was no visible lymphadenopathy.  Conjunctiva were non injected  There was no clubbing or edema of extremities.  The scalp, hair, face, eyebrows, lips, OP, neck, chest, back,   extremities X 4, nails were examined.  There was no hyperhidrosis or bromhidrosis.      Of note on skin exam:  Scaly red patches on the eyebrows, NLFs, and frontal scalp  urticarial papules and plaques on the trunk and extremities        Labs:                       10.8   8.02  )-----------( 296      ( 31 May 2023 05:44 )             32.8     CBC Full  -  ( 31 May 2023 05:44 )  WBC Count : 8.02 K/uL  RBC Count : 3.41 M/uL  Hemoglobin : 10.8 g/dL  Hematocrit : 32.8 %  Platelet Count - Automated : 296 K/uL  Mean Cell Volume : 96.2 fL  Mean Cell Hemoglobin : 31.7 pg  Mean Cell Hemoglobin Concentration : 32.9 gm/dL  Auto Neutrophil # : 6.38 K/uL  Auto Lymphocyte # : 1.21 K/uL  Auto Monocyte # : 0.43 K/uL  Auto Eosinophil # : 0.00 K/uL  Auto Basophil # : 0.00 K/uL  Auto Neutrophil % : 69.9 %  Auto Lymphocyte % : 15.1 %  Auto Monocyte % : 5.3 %  Auto Eosinophil % : 0.0 %  Auto Basophil % : 0.0 %    05-31    132<L>  |  101  |  6<L>  ----------------------------<  90  3.3<L>   |  20<L>  |  0.51    Ca    7.9<L>      31 May 2023 05:44  Phos  2.7     05-31  Mg     2.20     05-31          CAPILLARY BLOOD GLUCOSE

## 2023-06-01 NOTE — PROVIDER CONTACT NOTE (OTHER) - SITUATION
[facility] oral temp 100.1
Patient's Bp 96/48. Parameter to notify provider for systolic less than 100
patient hypotensive
temperature 100.4
Pt BP 98/48, pt asymptomatic. Pt BP has been running low.
523 [facility] oral temp 99
Pt BP 96/50, pt asymptomatic. Pt BP runs low.
Pt refusing all labs today. Pt states she is tired of being stuck everyday and wants a break.
pt refusing tele monitor
[address] [facility] 100.4 oral temp
BP 92/52
Patient temp 100.8
Pt . Parameter states contact provider if greater than 110.

## 2023-06-01 NOTE — PROGRESS NOTE ADULT - ATTENDING COMMENTS
Diffuse papular wheals noted on the body. Circled areas mostly resolved, supporting an evanescent process. Differential includes acute urticaria vs. urticaria in the context of an underlying systemic disease. Rheumatologic diseases, such as Adult-onset Still disease may present with this type of rash. There are no obvious drug or infectious culprits which may have lead to urticaria. Cont antihistamines. Patient reports rash is no longer pruritic, though it continues to be diffuse. Appreciate rheumatology recommendations. Prednisone will likely lead to skin improvement after liver biopsy is performed.    Rash on the face/scalp is chronic and distinct. Favor psoriasis/sebopsoriasis, though seborrheic dermatitis is in the differential as well. Pemphigus foliaceous may be considered, though this is less likely. Treated as seborrheic dermatitis by an outside dermatology without significant improvement. Patient interested in a biopsy to further assess. Will plan to perform tomorrow. I do not believe this is related to the diffuse rash on the body.     Alireza Lund MD, PharmD, MPH  Co-Director, Inpatient Dermatology Consultation Service, [facility]

## 2023-06-01 NOTE — PROGRESS NOTE ADULT - ASSESSMENT
59 y/o F PMHx HTN and MS (on ocrelizumab), presented with generalized weakness, fever/chills, and N/V. Persistent fever during admission with labs significant for transaminitis, bicytopenia, and markedly elevated ferritin. S/p BM biopsy for HLH workup.     Last fever 101.6 (5/23)  Ferritin 12k  Blood Cultures no growth  CMV,EBV,HSV PCR neg    Impression:  #Fever  #Transaminitis  #Elevated Ferritin  #Weakness, Unsteady Gait      Infectious work up unrevealing, EBV serologies suggestive of past infection unrelated to acute presentation. MRI not suggestive of pml.  I supsect a non infectious etiology    Recs:  - monitor off antibiotics  - monitor CBC  - monitor temp  - Rheum eval in progress  -Check HIV status    -No ID objection to trial of steroids if otherwise indicated     - Call ID service with additional questions

## 2023-06-01 NOTE — PROVIDER CONTACT NOTE (OTHER) - NAME OF MD/NP/PA/DO NOTIFIED:
MARIAA Reese
Tara Bdeolla
Tara Bedolla
gaudencio moran
MARIAA Alberts
golden Turner
golden Turner
Dania Blake
Issa Hill
Maddy Lema
Tara Bedolla
Yecenia Killbuck
golden Turner
golden Turner

## 2023-06-01 NOTE — CONSULT NOTE ADULT - SUBJECTIVE AND OBJECTIVE BOX
[facility]  [id number]    HISTORY OF PRESENT ILLNESS:    58-year-old F w/PMH of HTN, MS presented with general weakness, fever of unknown origin, rash, w/N&V. Rheumatology was consulted for further help to r/o auto-inflammatory disease.     Hospital summary: 5/20/23-current  On admission, her VS was remarkable for fever of 103.6F, tachycardia 118, and hypotension 83/52 mmHg. Labs was remarkable for hyponatremia 131, K+ 2.8,. LFTs showed AST//696. The UA was remarkable for large leukocyte esterase presence, RBC 6, and WBC 53. Patuebt was empirically started on Zosyn. Hepatology was consulted and sent multiple autoimmune hepatitis workup. All tests were negative. Despite the aggresive treatment, patient remains w/fever. Ferrritin level was elevated 12K. Pan-culture were all negative. Hepatology requested liver bx . ID was consulted and noticed multiple blanching rash in the skin suggesting possible Still disease. They requrested BM biopsy. Dermatology was consulted on 5/31/23 and stated that possible differential are Adult'Onset Still disease (AOSD), or drug related urticaria. Recommend consult Rheumatology.         Consult day  Patient reported 2 days of diarrhea, N&V prior admission. She took several dose of tylenol w/o help for her symptoms. Patient follows w/GI due to her transaminitis ~200 and currently unclear etiology since 2/2023. She is currently on Ocrevus infusion for her MS.      Laboratory review:   Reviewed the labs and noticed bandemia of 9.7 and anemia H/H 10.8/32.8 since May 25. AST/ALT improved and remains 185/149. Inflammatory marker showed ESR 40, and CRP 71.2. Bone marrow bx result showed normocellular bone marrow w/slight myeloid predominant trilineage hematopoiesis maturation No sign of hemophagocyte macrophage. Antibodies serologies for autoimmune workup was negative for MAYELA, RF, CCP. Anti-smooth was borderline elevated 1:20. CT abdomen did not show any sign of lymphoadenopathy or organomegaly.    PAST MEDICAL & SURGICAL HISTORY:  MS (multiple sclerosis)      HTN (hypertension)      No significant past surgical history          Review of Systems:  Gen:  No fevers/chills, weight loss  HEENT: No blurry vision, no difficulty swallowing, no oral or nasal ulcers  CVS: No chest pain/palpitations  Resp: No SOB/wheezing  GI: No N/V/C/D/abdominal pain  MSK:  Skin: No new rashes  Neuro: No headaches    MEDICATIONS  (STANDING):  amitriptyline 50 milliGRAM(s) Oral daily  famotidine    Tablet 20 milliGRAM(s) Oral daily  FIRST- Mouthwash  BLM 10 milliLiter(s) Swish and Spit three times a day  heparin   Injectable 5000 Unit(s) SubCutaneous every 8 hours  heparin  Lock Flush 100 Units/mL Injectable 100 Unit(s) IV Push once  lactated ringers. 1000 milliLiter(s) (50 mL/Hr) IV Continuous <Continuous>  lidocaine 2% Injectable 20 milliLiter(s) Local Injection once  loratadine 10 milliGRAM(s) Oral two times a day  potassium chloride    Tablet ER 40 milliEquivalent(s) Oral every 4 hours  pregabalin 75 milliGRAM(s) Oral daily    MEDICATIONS  (PRN):  acetaminophen     Tablet .. 650 milliGRAM(s) Oral every 6 hours PRN Temp greater or equal to 38C (100.4F)  diphenhydrAMINE 25 milliGRAM(s) Oral every 6 hours PRN Rash and/or Itching  ibuprofen  Tablet. 400 milliGRAM(s) Oral every 8 hours PRN Temp greater or equal to 38C (100.4F)  melatonin 3 milliGRAM(s) Oral at bedtime PRN Insomnia  ondansetron Injectable 4 milliGRAM(s) IV Push every 8 hours PRN Nausea and/or Vomiting      Allergies    No Known Allergies    Intolerances        PERTINENT MEDICATION HISTORY:    SOCIAL HISTORY:  OCCUPATION:  TRAVEL HISTORY:    FAMILY HISTORY:  FH: cirrhosis (Mother)    FH: esophageal cancer (Father)        Vital Signs Last 24 Hrs  T(C): 37.2 (01 Jun 2023 06:35), Max: 37.6 (31 May 2023 21:15)  T(F): 99 (01 Jun 2023 06:35), Max: 99.7 (31 May 2023 21:15)  HR: 108 (01 Jun 2023 06:35) (102 - 108)  BP: 94/57 (01 Jun 2023 06:35) (94/57 - 109/44)  BP(mean): --  RR: 16 (01 Jun 2023 06:35) (16 - 17)  SpO2: 98% (01 Jun 2023 06:35) (96% - 98%)    Parameters below as of 01 Jun 2023 06:35  Patient On (Oxygen Delivery Method): room air        Physical Exam:  General: No apparent distress  HEENT: EOMI, MMM  CVS: +S1/S2, RRR, no murmurs/rubs/gallops  Resp: CTA b/l. No crackles/wheezing  GI: Soft, NT/ND +BS  MSK:  Neuro: AAOx3  Skin: no visible rashes    LABS:                        10.8   8.02  )-----------( 296      ( 31 May 2023 05:44 )             32.8     05-31    132<L>  |  101  |  6<L>  ----------------------------<  90  3.3<L>   |  20<L>  |  0.51    Ca    7.9<L>      31 May 2023 05:44  Phos  2.7     05-31  Mg     2.20     05-31            RADIOLOGY & ADDITIONAL STUDIES:     [facility]  [id number]    HISTORY OF PRESENT ILLNESS:    58-year-old F w/PMH of HTN, MS presented with general weakness, fever of unknown origin, rash, w/N&V. Rheumatology was consulted for further help to r/o auto-inflammatory disease.     Hospital summary: 5/20/23-current  On admission, her VS was remarkable for fever of 103.6F, tachycardia 118, and hypotension 83/52 mmHg. Labs was remarkable for hyponatremia 131, K+ 2.8,. LFTs showed AST//696. The UA was remarkable for large leukocyte esterase presence, RBC 6, and WBC 53. Patient was empirically started on Zosyn. Hepatology was consulted and sent multiple autoimmune hepatitis workup. All tests were negative. Despite the aggressive treatment, patient remains w/fever. Ferritin level was elevated 12K. Pan-culture were all negative. Hepatology requested liver bx . ID was consulted and noticed multiple blanching rash in the skin suggesting possible Still disease. They requested BM biopsy. Dermatology was consulted on 5/31/23 and stated that possible differential are Adult'Onset Still disease (AOSD), or drug related urticaria. Recommend consult Rheumatology.         Consult day  Patient reported 2 days of diarrhea, N&V prior admission. She took several dose of Tylenol w/o help for her symptoms. Patient follows w/GI due to her transaminitis ~200 and currently unclear etiology since 2/2023. She is currently on Ocrevus infusion for her MS since 2018 w/o further issue. Patient denied any sick contact and recent travel outside from country.       ROS:  Positive: joint pain (MCPs, and PIPs), skin rash (face and body), skin itchiness, fever, fatigue   Negative: weight loss, ulcer in the mouth, chest pain, RF, sicca symptoms, joint swelling, abdominal pain, chest pain    Laboratory review:   Reviewed the labs and noticed bandemia of 9.7 and anemia H/H 10.8/32.8 since May 25. AST/ALT improved and remains 185/149. Inflammatory marker showed ESR 40, and CRP 71.2. Bone marrow bx result showed normocellular bone marrow w/slight myeloid predominant trilineage hematopoiesis maturation No sign of hemophagocyte macrophage. Antibodies serologies for autoimmune workup was negative for MAYELA, RF, CCP. Anti-smooth was borderline elevated 1:20. CT abdomen did not show any sign of lymphoadenopathy or organomegaly.    PAST MEDICAL & SURGICAL HISTORY:  MS (multiple sclerosis)      HTN (hypertension)      No significant past surgical history          Review of Systems:  see H&P    MEDICATIONS  (STANDING):  amitriptyline 50 milliGRAM(s) Oral daily  famotidine    Tablet 20 milliGRAM(s) Oral daily  FIRST- Mouthwash  BLM 10 milliLiter(s) Swish and Spit three times a day  heparin   Injectable 5000 Unit(s) SubCutaneous every 8 hours  heparin  Lock Flush 100 Units/mL Injectable 100 Unit(s) IV Push once  lactated ringers. 1000 milliLiter(s) (50 mL/Hr) IV Continuous <Continuous>  lidocaine 2% Injectable 20 milliLiter(s) Local Injection once  loratadine 10 milliGRAM(s) Oral two times a day  potassium chloride    Tablet ER 40 milliEquivalent(s) Oral every 4 hours  pregabalin 75 milliGRAM(s) Oral daily    MEDICATIONS  (PRN):  acetaminophen     Tablet .. 650 milliGRAM(s) Oral every 6 hours PRN Temp greater or equal to 38C (100.4F)  diphenhydrAMINE 25 milliGRAM(s) Oral every 6 hours PRN Rash and/or Itching  ibuprofen  Tablet. 400 milliGRAM(s) Oral every 8 hours PRN Temp greater or equal to 38C (100.4F)  melatonin 3 milliGRAM(s) Oral at bedtime PRN Insomnia  ondansetron Injectable 4 milliGRAM(s) IV Push every 8 hours PRN Nausea and/or Vomiting      Allergies    No Known Allergies    Intolerances        PERTINENT MEDICATION HISTORY:    SOCIAL HISTORY:  OCCUPATION:  TRAVEL HISTORY:    FAMILY HISTORY:  FH: cirrhosis (Mother)    FH: esophageal cancer (Father)        Vital Signs Last 24 Hrs  T(C): 37.2 (01 Jun 2023 06:35), Max: 37.6 (31 May 2023 21:15)  T(F): 99 (01 Jun 2023 06:35), Max: 99.7 (31 May 2023 21:15)  HR: 108 (01 Jun 2023 06:35) (102 - 108)  BP: 94/57 (01 Jun 2023 06:35) (94/57 - 109/44)  BP(mean): --  RR: 16 (01 Jun 2023 06:35) (16 - 17)  SpO2: 98% (01 Jun 2023 06:35) (96% - 98%)    Parameters below as of 01 Jun 2023 06:35  Patient On (Oxygen Delivery Method): room air        Physical Exam:  General: erythematous tongue, and pharynx   HEENT: EOMI, MMM  CVS: +S1/S2, RRR, no murmurs/rubs/gallops  Resp: CTA b/l. No crackles/wheezing  GI: Soft, NT/ND +BS  MSK: jaccoud arthropathies in both 2nd DIPs, rest of the joint no sign of tenosynovitis and good ROM  Neuro: AAOx3  Skin: multiple maculopapular pruritic rash in the chest, arms, back, and legs. Skin desquamation in the face     LABS:                        10.8   8.02  )-----------( 296      ( 31 May 2023 05:44 )             32.8     05-31    132<L>  |  101  |  6<L>  ----------------------------<  90  3.3<L>   |  20<L>  |  0.51    Ca    7.9<L>      31 May 2023 05:44  Phos  2.7     05-31  Mg     2.20     05-31        Cyclic Cit Pep Result: <8 Units (05.29.23 @ 06:05) CCP Antibody IgG Interpretation: Negative: Method: EIA Rheumatoid Factor Quant, Serum or Plasma: <10: Test Repeated IU/mL (05.29.23 @ 06:05) C-Reactive Protein, Serum: 71.2 mg/L (05.29.23 @ 06:05) Sedimentation Rate, Erythrocyte: 44 mm/hr (05.29.23 @ 06:05) Ferritin, Serum: 5853 ng/mL (05.28.23 @ 06:09) Rapid RVP Result: NotDetec (05.27.23 @ 23:19) [place] Gap/Lambda Free Light Chain Ratio, Serum: 0.92 Ratio (05.27.23 @ 08:02) Interleukin 2 Receptor, Soluble, Serum result: 3898.0: Culture - Blood (05.28.23 @ 01:55)   Specimen Source: .Blood Blood-Venous  Culture Results:   No growth to date.Culture - Blood (05.27.23 @ 23:36)   Specimen Source: .Blood Blood-Peripheral  Culture Results:   No growth to date.    RADIOLOGY & ADDITIONAL STUDIES:    < from: MR Head w/wo IV Cont (05.31.23 @ 18:26) >  No acute hemorrhage mass or mass effect    Developmental venous anomaly versus artifact involving the right gregory.    < end of copied text >  < from: CT Angio Chest PE Protocol w/ IV Cont (05.29.23 @ 13:12) >  Limited evaluation for detection of pulmonary embolism due to suboptimal   contrast opacification of the pulmonary arteries.    No saddle pulmonary embolism or pulmonary embolism within the truncus   anterior and interlobar pulmonary arteries. More peripheral branches are   not adequately assessed.    Right basilar 1.1 cm subpleural nodular opacity is indeterminate, but may   represent subsegmental atelectasis. Recommend CT chest follow-up in 3   months to assess for stability/clearing.    < end of copied text >  < from: CT Abdomen and Pelvis w/ IV Cont (05.23.23 @ 10:01) >  No acute intra-abdominal process    < end of copied text >  < from: CT Cervical Spine No Cont (08.22.22 @ 23:20) >  No acute cervical spine fracture or evidence of traumatic malalignment.   Cervical spondylosis.    < end of copied text >        Specimen(s) Submitted   1-Right bm bx   2-Bone marrow aspirate   Final Diagnosis   1, 2. Bone marrow biopsy and bone marrow aspirate:   -Overall normocellular bone marrow (50-60% total cellularity) with   slight myeloid preodminant trilineage hematopoiesis with maturation and a   proportion of intra-sinusoidal megakaryocytes

## 2023-06-01 NOTE — CONSULT NOTE ADULT - ASSESSMENT
58-year-old F w/PMH of HTN, MS presented with general weakness, fever of unknown origin, rash, w/N&V. Rheumatology was consulted for further help to r/o auto-inflammatory disease. 58-year-old F w/PMH of HTN, MS presented with general weakness, fever of unknown origin, rash, w/N&V. Rheumatology was consulted for further help to r/o auto-inflammatory disease.    #R/O antiinflammatory disease  Fever >3 weeks w/transaminitis, and multiple maculopapular pruritic rash  Inflammatory marker +ve ferritin >16K, IL2R 3K, CRP elevated on a setting of transaminitis   Patient on Ocrelizumab since 2018 for her MS w/o any complications  Extensive infectious workup: blood culture negative > x3 times, urine culture negative, and rapid virus panel   No sign of organomegaly or lymphadenopathies per imaging  Differentials: Schnitzler syndrome (urticarial skin rash, arthralgias), AOSD (except the type of skin rash she fulfill some of the criteria), other unusual related with paraneoplastic     Plan  Plan for empiric attempt for high dose of steroid   Check other infectious workup such as HIV, and syphilis   We will reassess     Note is incomplete, please wait for attending attestation  El Pederson MD  PGY-4 Rheumatology Fellow  Pager: [phone]   Available in teams     58-year-old F w/PMH of HTN, MS presented with general weakness, fever of unknown origin, rash, w/N&V. Rheumatology was consulted for further help to r/o auto-inflammatory disease.    #R/O antiinflammatory disease  Fever >3 weeks w/transaminitis, and multiple maculopapular pruritic rash  Inflammatory marker +ve ferritin >16K, IL2R 3K, CRP elevated on a setting of transaminitis   Patient on Ocrelizumab since 2018 for her MS w/o any complications  Extensive infectious workup: blood culture negative > x3 times, urine culture negative, and rapid virus panel   No sign of organomegaly or lymphadenopathies per imaging  Differentials: Schnitzler syndrome (urticarial skin rash, arthralgias), AOSD (except the type of skin rash she fulfill some of the criteria), other unusual related with paraneoplastic. Cannot r/o drug induced urticaria since this was developed during hospitalization   Discussed with dermatology and face lesions are different than her maculopapular rash    Plan  Plan to check Quantiferon, ISH, ANCA, Myomarker 3  Repeat UA and Protein/cre  Check SPEP, UPEP  We plan for empiric steroid like 40 mg methylprednisolone tomorrow after getting above labs    Check other infectious workup such as HIV, and syphilis       DW Dr. Racheal Pederson MD  PGY-4 Rheumatology Fellow  Pager: [phone]   Available in teams     58-year-old F w/PMH of HTN, MS presented with general weakness, fever of unknown origin, rash, w/N&V. Rheumatology was consulted for further help to r/o auto-inflammatory disease.    #R/O antiinflammatory disease  Fever >3 weeks w/transaminitis, and multiple maculopapular pruritic rash  Inflammatory marker +ve ferritin >16K, IL2R 3K, CRP elevated on a setting of transaminitis   Patient on Ocrelizumab since 2018 for her MS w/o any complications  Extensive infectious workup: blood culture negative > x3 times, urine culture negative, and rapid virus panel   No sign of organomegaly or lymphadenopathies per imaging  Differentials: Schnitzler syndrome (urticarial skin rash, arthralgias), AOSD (except the type of skin rash she fulfill some of the criteria), other unusual related with paraneoplastic. Cannot r/o drug induced urticaria since this was developed during hospitalization   Discussed with dermatology and face lesions are different than her maculopapular rash    Plan  Plan to check Quantiferon, ISH, ANCA, Myomarker 3  Repeat UA and Protein/cre  Check SPEP, UPEP  Patient willing to undergo skin biopsy, refer to dermatology  We plan for empiric steroid like 40 mg methylprednisolone tomorrow after getting above labs    Check other infectious workup such as HIV, and syphilis       DW Dr. Racheal Pederson MD  PGY-4 Rheumatology Fellow  Pager: [phone]   Available in teams

## 2023-06-02 LAB
ALBUMIN SERPL ELPH-MCNC: 3.2 G/DL — LOW (ref 3.3–5)
ALP SERPL-CCNC: 107 U/L — SIGNIFICANT CHANGE UP (ref 40–120)
ALT FLD-CCNC: 130 U/L — HIGH (ref 4–33)
ANION GAP SERPL CALC-SCNC: 11 MMOL/L — SIGNIFICANT CHANGE UP (ref 7–14)
ANION GAP SERPL CALC-SCNC: 13 MMOL/L — SIGNIFICANT CHANGE UP (ref 7–14)
AST SERPL-CCNC: 192 U/L — HIGH (ref 4–32)
BILIRUB SERPL-MCNC: 0.9 MG/DL — SIGNIFICANT CHANGE UP (ref 0.2–1.2)
BUN SERPL-MCNC: 5 MG/DL — LOW (ref 7–23)
BUN SERPL-MCNC: 5 MG/DL — LOW (ref 7–23)
CALCIUM SERPL-MCNC: 8.4 MG/DL — SIGNIFICANT CHANGE UP (ref 8.4–10.5)
CALCIUM SERPL-MCNC: 8.4 MG/DL — SIGNIFICANT CHANGE UP (ref 8.4–10.5)
CHLORIDE SERPL-SCNC: 102 MMOL/L — SIGNIFICANT CHANGE UP (ref 98–107)
CHLORIDE SERPL-SCNC: 103 MMOL/L — SIGNIFICANT CHANGE UP (ref 98–107)
CO2 SERPL-SCNC: 20 MMOL/L — LOW (ref 22–31)
CO2 SERPL-SCNC: 22 MMOL/L — SIGNIFICANT CHANGE UP (ref 22–31)
CREAT ?TM UR-MCNC: 131 MG/DL — SIGNIFICANT CHANGE UP
CREAT SERPL-MCNC: 0.56 MG/DL — SIGNIFICANT CHANGE UP (ref 0.5–1.3)
CREAT SERPL-MCNC: 0.56 MG/DL — SIGNIFICANT CHANGE UP (ref 0.5–1.3)
CULTURE RESULTS: SIGNIFICANT CHANGE UP
CULTURE RESULTS: SIGNIFICANT CHANGE UP
EGFR: 106 ML/MIN/1.73M2 — SIGNIFICANT CHANGE UP
EGFR: 106 ML/MIN/1.73M2 — SIGNIFICANT CHANGE UP
FERRITIN SERPL-MCNC: 5376 NG/ML — HIGH (ref 15–150)
FIBRINOGEN PPP-MCNC: 308 MG/DL — SIGNIFICANT CHANGE UP (ref 200–465)
GLUCOSE SERPL-MCNC: 90 MG/DL — SIGNIFICANT CHANGE UP (ref 70–99)
GLUCOSE SERPL-MCNC: 91 MG/DL — SIGNIFICANT CHANGE UP (ref 70–99)
HCT VFR BLD CALC: 26.8 % — LOW (ref 34.5–45)
HGB BLD-MCNC: 8.7 G/DL — LOW (ref 11.5–15.5)
HIV 1+2 AB+HIV1 P24 AG SERPL QL IA: SIGNIFICANT CHANGE UP
IGA FLD-MCNC: 511 MG/DL — HIGH (ref 70–400)
IGG FLD-MCNC: 958 MG/DL — SIGNIFICANT CHANGE UP (ref 700–1600)
IGM SERPL-MCNC: 60 MG/DL — SIGNIFICANT CHANGE UP (ref 40–230)
INR BLD: 1.23 RATIO — HIGH (ref 0.88–1.16)
MAGNESIUM SERPL-MCNC: 2.2 MG/DL — SIGNIFICANT CHANGE UP (ref 1.6–2.6)
MAGNESIUM SERPL-MCNC: 2.2 MG/DL — SIGNIFICANT CHANGE UP (ref 1.6–2.6)
MCHC RBC-ENTMCNC: 31.1 PG — SIGNIFICANT CHANGE UP (ref 27–34)
MCHC RBC-ENTMCNC: 32.5 GM/DL — SIGNIFICANT CHANGE UP (ref 32–36)
MCV RBC AUTO: 95.7 FL — SIGNIFICANT CHANGE UP (ref 80–100)
NRBC # BLD: 0 /100 WBCS — SIGNIFICANT CHANGE UP (ref 0–0)
NRBC # FLD: 0 K/UL — SIGNIFICANT CHANGE UP (ref 0–0)
ONKOSIGHT MYELOID SEQUENCE: (no result)
PHOSPHATE SERPL-MCNC: 3.2 MG/DL — SIGNIFICANT CHANGE UP (ref 2.5–4.5)
PHOSPHATE SERPL-MCNC: 3.3 MG/DL — SIGNIFICANT CHANGE UP (ref 2.5–4.5)
PLATELET # BLD AUTO: 324 K/UL — SIGNIFICANT CHANGE UP (ref 150–400)
POTASSIUM SERPL-MCNC: 3.8 MMOL/L — SIGNIFICANT CHANGE UP (ref 3.5–5.3)
POTASSIUM SERPL-MCNC: 3.9 MMOL/L — SIGNIFICANT CHANGE UP (ref 3.5–5.3)
POTASSIUM SERPL-SCNC: 3.8 MMOL/L — SIGNIFICANT CHANGE UP (ref 3.5–5.3)
POTASSIUM SERPL-SCNC: 3.9 MMOL/L — SIGNIFICANT CHANGE UP (ref 3.5–5.3)
PROT ?TM UR-MCNC: 68 MG/DL — SIGNIFICANT CHANGE UP
PROT SERPL-MCNC: 5.7 G/DL — LOW (ref 6–8.3)
PROT SERPL-MCNC: 6.2 G/DL — SIGNIFICANT CHANGE UP (ref 6–8.3)
PROT/CREAT UR-RTO: 0.5 RATIO — HIGH (ref 0–0.2)
PROTHROM AB SERPL-ACNC: 14.3 SEC — HIGH (ref 10.5–13.4)
RBC # BLD: 2.8 M/UL — LOW (ref 3.8–5.2)
RBC # FLD: 14.2 % — SIGNIFICANT CHANGE UP (ref 10.3–14.5)
SODIUM SERPL-SCNC: 135 MMOL/L — SIGNIFICANT CHANGE UP (ref 135–145)
SODIUM SERPL-SCNC: 136 MMOL/L — SIGNIFICANT CHANGE UP (ref 135–145)
SPECIMEN SOURCE: SIGNIFICANT CHANGE UP
SPECIMEN SOURCE: SIGNIFICANT CHANGE UP
WBC # BLD: 11.58 K/UL — HIGH (ref 3.8–10.5)
WBC # FLD AUTO: 11.58 K/UL — HIGH (ref 3.8–10.5)

## 2023-06-02 PROCEDURE — 84165 PROTEIN E-PHORESIS SERUM: CPT | Mod: 26

## 2023-06-02 PROCEDURE — 99233 SBSQ HOSP IP/OBS HIGH 50: CPT

## 2023-06-02 RX ADMIN — Medication 50 MILLIGRAM(S): at 17:31

## 2023-06-02 RX ADMIN — DIPHENHYDRAMINE HYDROCHLORIDE AND LIDOCAINE HYDROCHLORIDE AND ALUMINUM HYDROXIDE AND MAGNESIUM HYDRO 10 MILLILITER(S): KIT at 14:19

## 2023-06-02 RX ADMIN — Medication 75 MILLIGRAM(S): at 17:28

## 2023-06-02 RX ADMIN — DIPHENHYDRAMINE HYDROCHLORIDE AND LIDOCAINE HYDROCHLORIDE AND ALUMINUM HYDROXIDE AND MAGNESIUM HYDRO 10 MILLILITER(S): KIT at 05:56

## 2023-06-02 RX ADMIN — SODIUM CHLORIDE 50 MILLILITER(S): 9 INJECTION, SOLUTION INTRAVENOUS at 11:14

## 2023-06-02 RX ADMIN — LORATADINE 10 MILLIGRAM(S): 10 TABLET ORAL at 17:31

## 2023-06-02 RX ADMIN — HEPARIN SODIUM 5000 UNIT(S): 5000 INJECTION INTRAVENOUS; SUBCUTANEOUS at 17:29

## 2023-06-02 RX ADMIN — FAMOTIDINE 20 MILLIGRAM(S): 10 INJECTION INTRAVENOUS at 17:31

## 2023-06-02 RX ADMIN — DIPHENHYDRAMINE HYDROCHLORIDE AND LIDOCAINE HYDROCHLORIDE AND ALUMINUM HYDROXIDE AND MAGNESIUM HYDRO 10 MILLILITER(S): KIT at 22:24

## 2023-06-02 RX ADMIN — LORATADINE 10 MILLIGRAM(S): 10 TABLET ORAL at 05:56

## 2023-06-02 NOTE — PROGRESS NOTE ADULT - PROBLEM SELECTOR PLAN 3
Worsening chronic transaminitis  Reports elevated liver enzymes in "200s" since February, which were investigated by her gastroenterologist   -APAP level neg.  -Abd US showing fatty liver  -EBV Ag was positive and likely old infection, EBV PCR negative   - alpha-1 anti-trypsin (phenotype) 212, AFP 9.4   -MAYELA neg, anti-smooth muscle antibody titer high   -IR consulted for liver biopsy, scheduled for bx on 6/2   -Appreciate hepatology recs, discussed with hepatology attending on 6/1

## 2023-06-02 NOTE — CHART NOTE - NSCHARTNOTEFT_GEN_A_CORE
Rheumatology     58-year-old F w/PMH of HTN, MS presented with general weakness, fever of unknown origin, rash, w/N&V. Rheumatology was consulted for further help to r/o auto-inflammatory disease.    #R/O antiinflammatory disease  Fever >3 weeks w/transaminitis, and multiple maculopapular pruritic rash  Inflammatory marker +ve ferritin >16K, IL2R 3K, CRP elevated on a setting of transaminitis   Patient on Ocrelizumab since 2018 for her MS w/o any complications  Extensive infectious workup: blood culture negative > x3 times, urine culture negative, and rapid virus panel   No sign of organomegaly or lymphadenopathies per imaging  Differentials: Schnitzler syndrome (urticarial skin rash, arthralgias), AOSD (except the type of skin rash she fulfill some of the criteria), other unusual related with paraneoplastic. Cannot r/o drug induced urticaria since this was developed during hospitalization   Discussed with dermatology and face lesions are different than her maculopapular rash    Plan  pending Quantiferon, ISH, ANCA, Myomarker 3  pending UA and Protein/cre  pending SPEP, UPEP  Patient willing to undergo skin biopsy, refer to dermatology  Plan for liver bx today, however it get postponed. If hepatology agrees, please start on methylprednisolone 40 mg daily   Keep on PPI and PCP prophylaxis   pending HIV, and syphilis     El Pederson MD  PGY-4 Rheumatology Fellow  Pager: [phone]   Available in teams

## 2023-06-02 NOTE — CHART NOTE - NSCHARTNOTEFT_GEN_A_CORE
Liver biopsy initially planned for today will be moved to Monday 6/5 due to emergent circumstances surrounding prior cases.  -- NPO after midnight Sun 6/4  -- Hold AM anticoagulation on 6/5  -- routine AM labs plus coags (PT/PTT/INR) on 6/5  -- Plan communicated to provider on floor

## 2023-06-02 NOTE — CHART NOTE - NSCHARTNOTEFT_GEN_A_CORE
Hepatology team contacted in regards to starting steroids per Rheum recommendations. Hepatology has no objection to starting steroids tonight. Plan per IR will be for liver biopsy on Monday 6/5. Patient informed. Attg aware.

## 2023-06-02 NOTE — CHART NOTE - NSCHARTNOTESELECT_GEN_ALL_CORE
Event Note
IR pre procedure note/Event Note
Update note
hepatology fellow/Event Note
hepatology fellow/Event Note
ir procedure note/Event Note

## 2023-06-02 NOTE — PROGRESS NOTE ADULT - PROBLEM SELECTOR PLAN 1
Sepsis/SIRS present on admission, unclear etiology   Initially thought to be d/t gastroenteritis but diarrhea resolved   -persistent fevers despite antibiotics   -now w/ joint pain 5/28, will need to consider Still's disease and ruling out HLH as well   -blood and urine cultures NEG to date, repeat blood cultures NGTD   -GI PCR neg  -CXR w/ clear lungs   -RVP neg   -CT A/P unremarkable   - CT chest showed No saddle pulmonary embolism or pulmonary embolism within the truncus anterior and interlobar pulmonary arteries. Right basilar 1.1 cm subpleural nodular opacity is indeterminate, but may   represent subsegmental atelectasis. Recommend CT chest follow-up in 3 months to assess for stability/clearing.  -MRI brain showed No acute hemorrhage mass or mass effect. Developmental venous anomaly versus artifact involving the right gregory.  -ESR/CRP high,  RF neg<10,  and anti-citrullinated peptide antibody <8 ( neg)  -ID following, Dermatology consulted and folld  - Rheumatology consult appreciated,  - rec to start on steroids, discussed with hepatology, rec to wait till liver bx is done today to start on steroids,    - check rheum labs, pt had refused yesterday, attempt today

## 2023-06-02 NOTE — PROGRESS NOTE ADULT - SUBJECTIVE AND OBJECTIVE BOX
Patient is a 58y old  Female who presents with a chief complaint of Fever, nausea, vomiting (01 Jun 2023 15:50)      SUBJECTIVE / OVERNIGHT EVENTS: Pt seen and examined at 10:55am, no overnight events, reports improvement in the rash and itching, had refused labs yesterday and today, is tearful, initially refused liver bx, wants to have some meds for anxiety prior to the procedure ( agreed after extensive discussion) no other new issues reported.      MEDICATIONS  (STANDING):  amitriptyline 50 milliGRAM(s) Oral daily  famotidine    Tablet 20 milliGRAM(s) Oral daily  FIRST- Mouthwash  BLM 10 milliLiter(s) Swish and Spit three times a day  heparin   Injectable 5000 Unit(s) SubCutaneous every 8 hours  heparin  Lock Flush 100 Units/mL Injectable 100 Unit(s) IV Push once  lactated ringers. 1000 milliLiter(s) (50 mL/Hr) IV Continuous <Continuous>  lidocaine 2% Injectable 20 milliLiter(s) Local Injection once  loratadine 10 milliGRAM(s) Oral two times a day  potassium chloride    Tablet ER 40 milliEquivalent(s) Oral every 4 hours    MEDICATIONS  (PRN):  acetaminophen     Tablet .. 650 milliGRAM(s) Oral every 6 hours PRN Temp greater or equal to 38C (100.4F)  diphenhydrAMINE 25 milliGRAM(s) Oral every 6 hours PRN Rash and/or Itching  ibuprofen  Tablet. 400 milliGRAM(s) Oral every 8 hours PRN Temp greater or equal to 38C (100.4F)  melatonin 3 milliGRAM(s) Oral at bedtime PRN Insomnia  ondansetron Injectable 4 milliGRAM(s) IV Push every 8 hours PRN Nausea and/or Vomiting      Vital Signs Last 24 Hrs  T(C): 36.9 (02 Jun 2023 11:43), Max: 37.6 (01 Jun 2023 16:35)  T(F): 98.4 (02 Jun 2023 11:43), Max: 99.6 (01 Jun 2023 16:35)  HR: 106 (02 Jun 2023 11:43) (89 - 106)  BP: 102/62 (02 Jun 2023 11:43) (98/48 - 107/60)  BP(mean): --  RR: 18 (02 Jun 2023 11:43) (17 - 18)  SpO2: 100% (02 Jun 2023 11:43) (97% - 100%)    Parameters below as of 02 Jun 2023 11:43  Patient On (Oxygen Delivery Method): room air      CAPILLARY BLOOD GLUCOSE        I&O's Summary      PHYSICAL EXAM:  GENERAL: NAD, well-developed  CHEST/LUNG: Clear to auscultation bilaterally; No wheeze  HEART: Tachy to 104/mt  ABDOMEN: Soft, Nontender, Nondistended  EXTREMITIES: no LE edema  PSYCH: Calm  NEUROLOGY: AAOx3  SKIN: diffuse Maculopapular rash on the legs, chest, abdomen, face improving  LABS:                        8.7    11.58 )-----------( 324      ( 02 Jun 2023 11:00 )             26.8     06-02    135  |  102  |  5<L>  ----------------------------<  90  3.8   |  20<L>  |  0.56    Ca    8.4      02 Jun 2023 11:00  Phos  3.3     06-02  Mg     2.20     06-02    TPro  6.2  /  Alb  3.2<L>  /  TBili  0.9  /  DBili  x   /  AST  192<H>  /  ALT  130<H>  /  AlkPhos  107  06-02    PT/INR - ( 02 Jun 2023 12:00 )   PT: 14.3 sec;   INR: 1.23 ratio                   RADIOLOGY & ADDITIONAL TESTS:    Imaging Personally Reviewed:    Consultant(s) Notes Reviewed:      Care Discussed with Consultants/Other Providers:

## 2023-06-02 NOTE — PROGRESS NOTE ADULT - PROBLEM SELECTOR PLAN 2
Elevated Ferritin w/ bicytopenia, elevated LFT's, and persistent fevers. Ruling out HLH   -Ferritin 25613,   -cont to monitor ferritin  -triglyceride levels 205, Fibrinogen wnl   -s/p bone marrow biopsy 5/25, BM Biopsy did not show definitive  morphologic or immunophenotypic evidence of hemophagocytosis. PB flow WNL

## 2023-06-03 DIAGNOSIS — M08.20 JUVENILE RHEUMATOID ARTHRITIS WITH SYSTEMIC ONSET, UNSPECIFIED SITE: ICD-10-CM

## 2023-06-03 LAB
ANION GAP SERPL CALC-SCNC: 13 MMOL/L — SIGNIFICANT CHANGE UP (ref 7–14)
ANTI-RIBONUCLEAR PROTEIN: <0.2 AI — SIGNIFICANT CHANGE UP
APPEARANCE UR: CLEAR — SIGNIFICANT CHANGE UP
BACTERIA # UR AUTO: NEGATIVE — SIGNIFICANT CHANGE UP
BILIRUB UR-MCNC: NEGATIVE — SIGNIFICANT CHANGE UP
BUN SERPL-MCNC: 5 MG/DL — LOW (ref 7–23)
CALCIUM SERPL-MCNC: 8.6 MG/DL — SIGNIFICANT CHANGE UP (ref 8.4–10.5)
CHLORIDE SERPL-SCNC: 105 MMOL/L — SIGNIFICANT CHANGE UP (ref 98–107)
CO2 SERPL-SCNC: 21 MMOL/L — LOW (ref 22–31)
COLOR SPEC: YELLOW — SIGNIFICANT CHANGE UP
CREAT SERPL-MCNC: 0.5 MG/DL — SIGNIFICANT CHANGE UP (ref 0.5–1.3)
DIFF PNL FLD: ABNORMAL
EGFR: 109 ML/MIN/1.73M2 — SIGNIFICANT CHANGE UP
ENA SM AB FLD QL: <0.2 AI — SIGNIFICANT CHANGE UP
EPI CELLS # UR: 2 /HPF — SIGNIFICANT CHANGE UP (ref 0–5)
GLUCOSE SERPL-MCNC: 90 MG/DL — SIGNIFICANT CHANGE UP (ref 70–99)
GLUCOSE UR QL: NEGATIVE — SIGNIFICANT CHANGE UP
HCT VFR BLD CALC: 30.2 % — LOW (ref 34.5–45)
HGB BLD-MCNC: 10 G/DL — LOW (ref 11.5–15.5)
HYALINE CASTS # UR AUTO: 1 /LPF — SIGNIFICANT CHANGE UP (ref 0–7)
KETONES UR-MCNC: ABNORMAL
LEUKOCYTE ESTERASE UR-ACNC: NEGATIVE — SIGNIFICANT CHANGE UP
MAGNESIUM SERPL-MCNC: 2.1 MG/DL — SIGNIFICANT CHANGE UP (ref 1.6–2.6)
MCHC RBC-ENTMCNC: 30.6 PG — SIGNIFICANT CHANGE UP (ref 27–34)
MCHC RBC-ENTMCNC: 33.1 GM/DL — SIGNIFICANT CHANGE UP (ref 32–36)
MCV RBC AUTO: 92.4 FL — SIGNIFICANT CHANGE UP (ref 80–100)
NITRITE UR-MCNC: NEGATIVE — SIGNIFICANT CHANGE UP
NRBC # BLD: 0 /100 WBCS — SIGNIFICANT CHANGE UP (ref 0–0)
NRBC # FLD: 0.03 K/UL — HIGH (ref 0–0)
PH UR: 6.5 — SIGNIFICANT CHANGE UP (ref 5–8)
PHOSPHATE SERPL-MCNC: 3.8 MG/DL — SIGNIFICANT CHANGE UP (ref 2.5–4.5)
PLATELET # BLD AUTO: 385 K/UL — SIGNIFICANT CHANGE UP (ref 150–400)
POTASSIUM SERPL-MCNC: 4.2 MMOL/L — SIGNIFICANT CHANGE UP (ref 3.5–5.3)
POTASSIUM SERPL-SCNC: 4.2 MMOL/L — SIGNIFICANT CHANGE UP (ref 3.5–5.3)
PROT UR-MCNC: ABNORMAL
RBC # BLD: 3.27 M/UL — LOW (ref 3.8–5.2)
RBC # FLD: 14.1 % — SIGNIFICANT CHANGE UP (ref 10.3–14.5)
RBC CASTS # UR COMP ASSIST: 22 /HPF — HIGH (ref 0–4)
SODIUM SERPL-SCNC: 139 MMOL/L — SIGNIFICANT CHANGE UP (ref 135–145)
SP GR SPEC: 1.01 — SIGNIFICANT CHANGE UP (ref 1.01–1.05)
T PALLIDUM AB TITR SER: NEGATIVE — SIGNIFICANT CHANGE UP
UROBILINOGEN FLD QL: ABNORMAL
WBC # BLD: 12.42 K/UL — HIGH (ref 3.8–10.5)
WBC # FLD AUTO: 12.42 K/UL — HIGH (ref 3.8–10.5)
WBC UR QL: 7 /HPF — HIGH (ref 0–5)

## 2023-06-03 PROCEDURE — 99233 SBSQ HOSP IP/OBS HIGH 50: CPT

## 2023-06-03 RX ORDER — PANTOPRAZOLE SODIUM 20 MG/1
40 TABLET, DELAYED RELEASE ORAL
Refills: 0 | Status: DISCONTINUED | OUTPATIENT
Start: 2023-06-03 | End: 2023-06-08

## 2023-06-03 RX ADMIN — Medication 50 MILLIGRAM(S): at 16:38

## 2023-06-03 RX ADMIN — DIPHENHYDRAMINE HYDROCHLORIDE AND LIDOCAINE HYDROCHLORIDE AND ALUMINUM HYDROXIDE AND MAGNESIUM HYDRO 10 MILLILITER(S): KIT at 05:51

## 2023-06-03 RX ADMIN — Medication 75 MILLIGRAM(S): at 13:12

## 2023-06-03 RX ADMIN — SODIUM CHLORIDE 50 MILLILITER(S): 9 INJECTION, SOLUTION INTRAVENOUS at 05:51

## 2023-06-03 RX ADMIN — LORATADINE 10 MILLIGRAM(S): 10 TABLET ORAL at 05:51

## 2023-06-03 RX ADMIN — LORATADINE 10 MILLIGRAM(S): 10 TABLET ORAL at 16:41

## 2023-06-03 RX ADMIN — FAMOTIDINE 20 MILLIGRAM(S): 10 INJECTION INTRAVENOUS at 13:09

## 2023-06-03 RX ADMIN — DIPHENHYDRAMINE HYDROCHLORIDE AND LIDOCAINE HYDROCHLORIDE AND ALUMINUM HYDROXIDE AND MAGNESIUM HYDRO 10 MILLILITER(S): KIT at 16:39

## 2023-06-03 RX ADMIN — Medication 40 MILLIGRAM(S): at 05:51

## 2023-06-03 RX ADMIN — DIPHENHYDRAMINE HYDROCHLORIDE AND LIDOCAINE HYDROCHLORIDE AND ALUMINUM HYDROXIDE AND MAGNESIUM HYDRO 10 MILLILITER(S): KIT at 21:18

## 2023-06-03 RX ADMIN — HEPARIN SODIUM 5000 UNIT(S): 5000 INJECTION INTRAVENOUS; SUBCUTANEOUS at 13:10

## 2023-06-03 RX ADMIN — HEPARIN SODIUM 5000 UNIT(S): 5000 INJECTION INTRAVENOUS; SUBCUTANEOUS at 21:18

## 2023-06-03 NOTE — PROGRESS NOTE ADULT - PROBLEM SELECTOR PLAN 1
Rheumatology and Dermatology consulted  started on methylprednisolone 40mg daily  ppi ppx  f/u Rheum and derm recs  - f/u rheum labs,   -syphilis neg, hiv neg

## 2023-06-03 NOTE — PROGRESS NOTE ADULT - PROBLEM SELECTOR PLAN 2
Sepsis/SIRS present on admission, unclear etiology   Initially thought to be d/t gastroenteritis but diarrhea resolved   -persistent fevers despite antibiotics   -now w/ joint pain 5/28, will need to consider Still's disease and ruling out HLH as well   -blood and urine cultures NEG to date, repeat blood cultures NGTD   -GI PCR neg  -CXR w/ clear lungs   -RVP neg   -CT A/P unremarkable   - CT chest showed No saddle pulmonary embolism or pulmonary embolism within the truncus anterior and interlobar pulmonary arteries. Right basilar 1.1 cm subpleural nodular opacity is indeterminate, but may   represent subsegmental atelectasis. Recommend CT chest follow-up in 3 months to assess for stability/clearing.  -MRI brain showed No acute hemorrhage mass or mass effect. Developmental venous anomaly versus artifact involving the right gregory.  -ESR/CRP high,  RF neg<10,  and anti-citrullinated peptide antibody <8 ( neg)  -ID following, Dermatology consulted and folld  - Rheumatology consult appreciated,  - rec to start on steroids, was waiting for liver bx ( on friday) prior to starting steroids however bx was not able to be done on Friday, per hepatology and rheumatology ok to start on steroids prior to bx and hence started on methylprednisolone 40mg on 6/2  - cont steroids   - f/u rheum labs,   -syphilis neg, hiv neg

## 2023-06-03 NOTE — PROGRESS NOTE ADULT - PROBLEM SELECTOR PLAN 3
Elevated Ferritin w/ bicytopenia, elevated LFT's, and persistent fevers. Ruling out HLH   -Ferritin [id number],   -cont to monitor ferritin  -triglyceride levels 205, Fibrinogen wnl   -s/p bone marrow biopsy 5/25, BM Biopsy did not show definitive  morphologic or immunophenotypic evidence of hemophagocytosis. PB flow WNL

## 2023-06-03 NOTE — PROGRESS NOTE ADULT - SUBJECTIVE AND OBJECTIVE BOX
Patient is a 58y old  Female who presents with a chief complaint of Fever, nausea, vomiting (2023 12:50)      SUBJECTIVE / OVERNIGHT EVENTS: Pt seen and examined at 11:20am, no overnight events, reports improvement in the rash and itching, no other complaints, no other new issues reported.    MEDICATIONS  (STANDING):  amitriptyline 50 milliGRAM(s) Oral daily  FIRST- Mouthwash  BLM 10 milliLiter(s) Swish and Spit three times a day  heparin   Injectable 5000 Unit(s) SubCutaneous every 8 hours  heparin  Lock Flush 100 Units/mL Injectable 100 Unit(s) IV Push once  lactated ringers. 1000 milliLiter(s) (50 mL/Hr) IV Continuous <Continuous>  lidocaine 2% Injectable 20 milliLiter(s) Local Injection once  loratadine 10 milliGRAM(s) Oral two times a day  LORazepam   Injectable 2 milliGRAM(s) IV Push once  methylPREDNISolone sodium succinate Injectable 40 milliGRAM(s) IV Push daily  pantoprazole    Tablet 40 milliGRAM(s) Oral before breakfast  potassium chloride    Tablet ER 40 milliEquivalent(s) Oral every 4 hours  pregabalin 75 milliGRAM(s) Oral daily    MEDICATIONS  (PRN):  acetaminophen     Tablet .. 650 milliGRAM(s) Oral every 6 hours PRN Temp greater or equal to 38C (100.4F)  diphenhydrAMINE 25 milliGRAM(s) Oral every 6 hours PRN Rash and/or Itching  ibuprofen  Tablet. 400 milliGRAM(s) Oral every 8 hours PRN Temp greater or equal to 38C (100.4F)  melatonin 3 milliGRAM(s) Oral at bedtime PRN Insomnia  ondansetron Injectable 4 milliGRAM(s) IV Push every 8 hours PRN Nausea and/or Vomiting      Vital Signs Last 24 Hrs  T(C): 36.5 (2023 11:15), Max: 37.2 (2023 20:24)  T(F): 97.7 (2023 11:15), Max: 98.9 (2023 20:24)  HR: 97 (2023 11:15) (85 - 100)  BP: 116/66 (2023 11:15) (100/69 - 116/66)  BP(mean): --  RR: 18 (2023 11:15) (18 - 18)  SpO2: 98% (2023 11:15) (98% - 100%)    Parameters below as of 2023 11:15  Patient On (Oxygen Delivery Method): room air      CAPILLARY BLOOD GLUCOSE        I&O's Summary      PHYSICAL EXAM:  GENERAL: NAD, well-developed  CHEST/LUNG: Clear to auscultation bilaterally; No wheeze  HEART: Tachy to 100/mt  ABDOMEN: Soft, Nontender, Nondistended  EXTREMITIES: no LE edema  PSYCH: Calm  NEUROLOGY: AAOx3  SKIN: diffuse Maculopapular rash on the legs, chest, abdomen, face improving    LABS:                        10.0   12.42 )-----------( 385      ( 2023 05:50 )             30.2     06-03    139  |  105  |  5<L>  ----------------------------<  90  4.2   |  21<L>  |  0.50    Ca    8.6      2023 05:50  Phos  3.8     06-03  Mg     2.10     06-03    TPro  5.7<L>  /  Alb  x   /  TBili  x   /  DBili  x   /  AST  x   /  ALT  x   /  AlkPhos  x   06-02    PT/INR - ( 2023 12:00 )   PT: 14.3 sec;   INR: 1.23 ratio               Urinalysis Basic - ( 2023 07:35 )    Color: Yellow / Appearance: Clear / S.012 / pH: x  Gluc: x / Ketone: Small  / Bili: Negative / Urobili: 12 mg/dL   Blood: x / Protein: 30 mg/dL / Nitrite: Negative   Leuk Esterase: Negative / RBC: 22 /HPF / WBC 7 /HPF   Sq Epi: x / Non Sq Epi: x / Bacteria: Negative        RADIOLOGY & ADDITIONAL TESTS:    Imaging Personally Reviewed:    Consultant(s) Notes Reviewed:      Care Discussed with Consultants/Other Providers:

## 2023-06-04 ENCOUNTER — TRANSCRIPTION ENCOUNTER (OUTPATIENT)
Age: 58
End: 2023-06-04

## 2023-06-04 LAB
ANION GAP SERPL CALC-SCNC: 13 MMOL/L — SIGNIFICANT CHANGE UP (ref 7–14)
AUTO DIFF PNL BLD: ABNORMAL
BUN SERPL-MCNC: 7 MG/DL — SIGNIFICANT CHANGE UP (ref 7–23)
C-ANCA SER-ACNC: NEGATIVE — SIGNIFICANT CHANGE UP
CALCIUM SERPL-MCNC: 8.4 MG/DL — SIGNIFICANT CHANGE UP (ref 8.4–10.5)
CHLORIDE SERPL-SCNC: 109 MMOL/L — HIGH (ref 98–107)
CO2 SERPL-SCNC: 22 MMOL/L — SIGNIFICANT CHANGE UP (ref 22–31)
CREAT SERPL-MCNC: 0.6 MG/DL — SIGNIFICANT CHANGE UP (ref 0.5–1.3)
EGFR: 104 ML/MIN/1.73M2 — SIGNIFICANT CHANGE UP
GLUCOSE SERPL-MCNC: 97 MG/DL — SIGNIFICANT CHANGE UP (ref 70–99)
HCT VFR BLD CALC: 31.5 % — LOW (ref 34.5–45)
HGB BLD-MCNC: 10.4 G/DL — LOW (ref 11.5–15.5)
IL2 SERPL-MCNC: 3970.4 PG/ML — HIGH (ref 175.3–858.2)
MAGNESIUM SERPL-MCNC: 2.2 MG/DL — SIGNIFICANT CHANGE UP (ref 1.6–2.6)
MCHC RBC-ENTMCNC: 31.8 PG — SIGNIFICANT CHANGE UP (ref 27–34)
MCHC RBC-ENTMCNC: 33 GM/DL — SIGNIFICANT CHANGE UP (ref 32–36)
MCV RBC AUTO: 96.3 FL — SIGNIFICANT CHANGE UP (ref 80–100)
MPO AB + PR3 PNL SER: SIGNIFICANT CHANGE UP
NRBC # BLD: 0 /100 WBCS — SIGNIFICANT CHANGE UP (ref 0–0)
NRBC # FLD: 0.04 K/UL — HIGH (ref 0–0)
P-ANCA SER-ACNC: NEGATIVE — SIGNIFICANT CHANGE UP
PHOSPHATE SERPL-MCNC: 3.2 MG/DL — SIGNIFICANT CHANGE UP (ref 2.5–4.5)
PLATELET # BLD AUTO: 430 K/UL — HIGH (ref 150–400)
POTASSIUM SERPL-MCNC: 3.6 MMOL/L — SIGNIFICANT CHANGE UP (ref 3.5–5.3)
POTASSIUM SERPL-SCNC: 3.6 MMOL/L — SIGNIFICANT CHANGE UP (ref 3.5–5.3)
RBC # BLD: 3.27 M/UL — LOW (ref 3.8–5.2)
RBC # FLD: 14.5 % — SIGNIFICANT CHANGE UP (ref 10.3–14.5)
SODIUM SERPL-SCNC: 144 MMOL/L — SIGNIFICANT CHANGE UP (ref 135–145)
WBC # BLD: 16.87 K/UL — HIGH (ref 3.8–10.5)
WBC # FLD AUTO: 16.87 K/UL — HIGH (ref 3.8–10.5)

## 2023-06-04 PROCEDURE — 99232 SBSQ HOSP IP/OBS MODERATE 35: CPT

## 2023-06-04 RX ORDER — POTASSIUM CHLORIDE 20 MEQ
40 PACKET (EA) ORAL ONCE
Refills: 0 | Status: COMPLETED | OUTPATIENT
Start: 2023-06-04 | End: 2023-06-04

## 2023-06-04 RX ADMIN — DIPHENHYDRAMINE HYDROCHLORIDE AND LIDOCAINE HYDROCHLORIDE AND ALUMINUM HYDROXIDE AND MAGNESIUM HYDRO 10 MILLILITER(S): KIT at 05:12

## 2023-06-04 RX ADMIN — DIPHENHYDRAMINE HYDROCHLORIDE AND LIDOCAINE HYDROCHLORIDE AND ALUMINUM HYDROXIDE AND MAGNESIUM HYDRO 10 MILLILITER(S): KIT at 21:24

## 2023-06-04 RX ADMIN — HEPARIN SODIUM 5000 UNIT(S): 5000 INJECTION INTRAVENOUS; SUBCUTANEOUS at 21:29

## 2023-06-04 RX ADMIN — Medication 40 MILLIEQUIVALENT(S): at 13:26

## 2023-06-04 RX ADMIN — DIPHENHYDRAMINE HYDROCHLORIDE AND LIDOCAINE HYDROCHLORIDE AND ALUMINUM HYDROXIDE AND MAGNESIUM HYDRO 10 MILLILITER(S): KIT at 13:27

## 2023-06-04 RX ADMIN — Medication 40 MILLIGRAM(S): at 05:11

## 2023-06-04 RX ADMIN — LORATADINE 10 MILLIGRAM(S): 10 TABLET ORAL at 16:37

## 2023-06-04 RX ADMIN — HEPARIN SODIUM 5000 UNIT(S): 5000 INJECTION INTRAVENOUS; SUBCUTANEOUS at 05:11

## 2023-06-04 RX ADMIN — Medication 75 MILLIGRAM(S): at 13:26

## 2023-06-04 RX ADMIN — HEPARIN SODIUM 5000 UNIT(S): 5000 INJECTION INTRAVENOUS; SUBCUTANEOUS at 13:30

## 2023-06-04 RX ADMIN — Medication 50 MILLIGRAM(S): at 16:36

## 2023-06-04 RX ADMIN — LORATADINE 10 MILLIGRAM(S): 10 TABLET ORAL at 05:11

## 2023-06-04 NOTE — DISCHARGE NOTE PROVIDER - NSDCMRMEDTOKEN_GEN_ALL_CORE_FT
amitriptyline 50 mg oral tablet: 1 orally once a day  Lyrica 75 mg oral capsule: 1 orally once a day  Triplixam:    amitriptyline 50 mg oral tablet: 1 orally once a day  loratadine 10 mg oral tablet: 1 tab(s) orally 2 times a day  Lyrica 75 mg oral capsule: 1 orally once a day  methylPREDNISolone 8 mg oral tablet: 5 tab(s) orally once a day  pantoprazole 40 mg oral delayed release tablet: 1 tab(s) orally once a day (before a meal)  Triplixam:

## 2023-06-04 NOTE — DISCHARGE NOTE PROVIDER - CARE PROVIDER_API CALL
Alireza Lund  Dermatology  [address], [address]  [place] [address]  Phone: [phone]  Fax: [phone]  Follow Up Time: 2 weeks   Alireza Lund  Dermatology  [address], [address]  [place] [address]  Phone: [phone]  Fax: [phone]  Follow Up Time: 2 weeks    Kimber Collins  Rheumatology  [address]  [place] [address]  Phone: [phone]  Fax: [phone]  Follow Up Time:     Yessica Sánchez  [facility]  [address] [place] [address]  Phone: [phone]  Fax: [phone]  Follow Up Time:     DUC DAILEY  [address] [address]  Phone: [phone]  Fax: [phone]  Follow Up Time:

## 2023-06-04 NOTE — DISCHARGE NOTE PROVIDER - NSDCFUADDAPPT_GEN_ALL_CORE_FT
Dermatology office will call you to schedule follow up appointment. You can follow up with [facility] Dermatology or your outside dermatologist about 2-4 weeks after discharge. Dermatology office will call you to schedule follow up appointment. You can follow up with [facility] Dermatology or your outside dermatologist about 2-4 weeks after discharge.    Follow up with Rheumatology - they will follow up on your liver biopsy    Outpatient follow up with your Neurologist Dr. Nayan Holly at [facility] - will need to discuss about tapering down the steroids.

## 2023-06-04 NOTE — PROGRESS NOTE ADULT - SUBJECTIVE AND OBJECTIVE BOX
Patient is a 58y old  Female who presents with a chief complaint of Fever, nausea, vomiting (2023 15:39)      SUBJECTIVE / OVERNIGHT EVENTS: Pt seen and examined at 10:30am, no overnight events, reports that rash and itching is much better, no other complaints, no other new issues reported.        MEDICATIONS  (STANDING):  amitriptyline 50 milliGRAM(s) Oral daily  FIRST- Mouthwash  BLM 10 milliLiter(s) Swish and Spit three times a day  heparin   Injectable 5000 Unit(s) SubCutaneous every 8 hours  heparin  Lock Flush 100 Units/mL Injectable 100 Unit(s) IV Push once  lidocaine 2% Injectable 20 milliLiter(s) Local Injection once  loratadine 10 milliGRAM(s) Oral two times a day  LORazepam   Injectable 2 milliGRAM(s) IV Push once  methylPREDNISolone sodium succinate Injectable 40 milliGRAM(s) IV Push daily  pantoprazole    Tablet 40 milliGRAM(s) Oral before breakfast  potassium chloride    Tablet ER 40 milliEquivalent(s) Oral once  potassium chloride    Tablet ER 40 milliEquivalent(s) Oral every 4 hours  pregabalin 75 milliGRAM(s) Oral daily    MEDICATIONS  (PRN):  acetaminophen     Tablet .. 650 milliGRAM(s) Oral every 6 hours PRN Temp greater or equal to 38C (100.4F)  diphenhydrAMINE 25 milliGRAM(s) Oral every 6 hours PRN Rash and/or Itching  ibuprofen  Tablet. 400 milliGRAM(s) Oral every 8 hours PRN Temp greater or equal to 38C (100.4F)  melatonin 3 milliGRAM(s) Oral at bedtime PRN Insomnia  ondansetron Injectable 4 milliGRAM(s) IV Push every 8 hours PRN Nausea and/or Vomiting      Vital Signs Last 24 Hrs  T(C): 36.3 (2023 11:00), Max: 37.1 (2023 05:00)  T(F): 97.3 (2023 11:00), Max: 98.8 (2023 05:00)  HR: 98 (2023 11:00) (82 - 98)  BP: 112/66 (2023 11:00) (105/60 - 112/66)  BP(mean): --  RR: 18 (2023 11:00) (18 - 18)  SpO2: 97% (2023 11:00) (97% - 98%)    Parameters below as of 2023 11:00  Patient On (Oxygen Delivery Method): room air      CAPILLARY BLOOD GLUCOSE        I&O's Summary      PHYSICAL EXAM:  GENERAL: NAD, well-developed  CHEST/LUNG: Clear to auscultation bilaterally; No wheeze  HEART: Regular rate and rhythm  ABDOMEN: Soft, Nontender, Nondistended  EXTREMITIES: no LE edema  PSYCH: Calm  NEUROLOGY: AAOx3  SKIN: diffuse Maculopapular rash on the legs, chest, abdomen, face much improved      LABS:                        10.4   16.87 )-----------( 430      ( 2023 05:40 )             31.5     06-04    144  |  109<H>  |  7   ----------------------------<  97  3.6   |  22  |  0.60    Ca    8.4      2023 05:40  Phos  3.2     06-04  Mg     2.20     06-04    TPro  5.7<L>  /  Alb  x   /  TBili  x   /  DBili  x   /  AST  x   /  ALT  x   /  AlkPhos  x   06-02          Urinalysis Basic - ( 2023 07:35 )    Color: Yellow / Appearance: Clear / S.012 / pH: x  Gluc: x / Ketone: Small  / Bili: Negative / Urobili: 12 mg/dL   Blood: x / Protein: 30 mg/dL / Nitrite: Negative   Leuk Esterase: Negative / RBC: 22 /HPF / WBC 7 /HPF   Sq Epi: x / Non Sq Epi: x / Bacteria: Negative        RADIOLOGY & ADDITIONAL TESTS:    Imaging Personally Reviewed:    Consultant(s) Notes Reviewed:      Care Discussed with Consultants/Other Providers:

## 2023-06-04 NOTE — DISCHARGE NOTE PROVIDER - PROVIDER TOKENS
PROVIDER:[TOKEN:[[id number]],FOLLOWUP:[2 weeks]] PROVIDER:[TOKEN:[[id number]],FOLLOWUP:[2 weeks]],PROVIDER:[TOKEN:[[id number]]],PROVIDER:[TOKEN:[[id number]]],PROVIDER:[TOKEN:[[id number]]]

## 2023-06-04 NOTE — DISCHARGE NOTE PROVIDER - NSDCCPCAREPLAN_GEN_ALL_CORE_FT
PRINCIPAL DISCHARGE DIAGNOSIS  Diagnosis: Still's disease  Assessment and Plan of Treatment: you were treated with steroids, symptoms improved, f/u with Rheumatology doctor as outpt, take meds as prescribed      SECONDARY DISCHARGE DIAGNOSES  Diagnosis: Elevated ferritin  Assessment and Plan of Treatment: You had bone marrow biopsy done which was normal, f/u with yor pcp    Diagnosis: Abnormal transaminases  Assessment and Plan of Treatment: you had liver biospy done     PRINCIPAL DISCHARGE DIAGNOSIS  Diagnosis: Still's disease  Assessment and Plan of Treatment: Possibility of Still's disease, you were treated with steroids, symptoms improved. Follow up with Rheumatology doctor as outpt, take steroids as precribed and primary neurologist to taper.      SECONDARY DISCHARGE DIAGNOSES  Diagnosis: Elevated ferritin  Assessment and Plan of Treatment: Likely due to inflammatory process. You had bone marrow biopsy done which was normal, follow up with your pcp    Diagnosis: Abnormal transaminases  Assessment and Plan of Treatment: you had liver biospy done 6/5/23. Follow up results with hepatology as outpatient.    Diagnosis: MS (multiple sclerosis)  Assessment and Plan of Treatment: Follow up with primary neurologist.

## 2023-06-04 NOTE — PROGRESS NOTE ADULT - PROBLEM SELECTOR PLAN 4
Worsening chronic transaminitis  Reports elevated liver enzymes in "200s" since February, which were investigated by her gastroenterologist   -APAP level neg.  -Abd US showing fatty liver  -EBV Ag was positive and likely old infection, EBV PCR negative   - alpha-1 anti-trypsin (phenotype) 212, AFP 9.4   -MAYELA neg, anti-smooth muscle antibody titer high   -IR consulted for liver biopsy, scheduled for bx on 6/2 but postponed due to scheduling issues to Monday  -Appreciate hepatology recs, discussed with hepatology attending on 6/1

## 2023-06-04 NOTE — PROGRESS NOTE ADULT - PROBLEM SELECTOR PLAN 1
Rheumatology and Dermatology consulted  Cont methylprednisolone 40mg daily  ppi ppx  f/u Rheum and derm recs  - f/u rheum labs,   -IGA elevated, salmeron ab neg  -syphilis neg, hiv neg  - for liver bx on 6/5

## 2023-06-04 NOTE — PROGRESS NOTE ADULT - PROBLEM SELECTOR PLAN 3
Elevated Ferritin w/ bicytopenia, elevated LFT's, and persistent fevers. Ruling out HLH   -Ferritin [id number],   -cont to monitor ferritin  -triglyceride levels 205, Fibrinogen wnl   -s/p bone marrow biopsy 5/25, BM Biopsy did not show definitive  morphologic or immunophenotypic evidence of hemophagocytosis. PB flow WNL

## 2023-06-04 NOTE — DISCHARGE NOTE PROVIDER - HOSPITAL COURSE
57yo Female with MHx of HTN, MS a/w SIRS of unclear etiology, c/b hypotension and SUSAN; Found to have transaminitis      Still's disease.    Rheumatology and Dermatology consulted  Cont methylprednisolone 40mg daily  ppi ppx  f/u Rheum and derm recs  - f/u rheum labs,   -IGA elevated, salmeron ab neg  -syphilis neg, hiv neg  - for liver bx on 6/5.     Systemic inflammatory response syndrome (SIRS).    Sepsis/SIRS present on admission, unclear etiology   Initially thought to be d/t gastroenteritis but diarrhea resolved   -persistent fevers despite antibiotics   -now w/ joint pain 5/28, will need to consider Still's disease and ruling out HLH as well   -blood and urine cultures NEG to date, repeat blood cultures NGTD   -GI PCR neg  -CXR w/ clear lungs   -RVP neg   -CT A/P unremarkable   - CT chest showed No saddle pulmonary embolism or pulmonary embolism within the truncus anterior and interlobar pulmonary arteries. Right basilar 1.1 cm subpleural nodular opacity is indeterminate, but may   represent subsegmental atelectasis. Recommend CT chest follow-up in 3 months to assess for stability/clearing.  -MRI brain showed No acute hemorrhage mass or mass effect. Developmental venous anomaly versus artifact involving the right gregory.  -ESR/CRP high,  RF neg<10,  and anti-citrullinated peptide antibody <8 ( neg)  -ID following, Dermatology consulted and folld  - Rheumatology consult appreciated,  - rec to start on steroids, was waiting for liver bx ( on friday) prior to starting steroids however bx was not able to be done on Friday, per hepatology and rheumatology ok to start on steroids prior to bx and hence started on methylprednisolone 40mg on 6/2  - cont steroids   - f/u rheum labs,   -syphilis neg, hiv neg.     Elevated ferritin.    Elevated Ferritin w/ bicytopenia, elevated LFT's, and persistent fevers. Ruling out HLH   -Ferritin [id number],   -cont to monitor ferritin  -triglyceride levels 205, Fibrinogen wnl   -s/p bone marrow biopsy 5/25, BM Biopsy did not show definitive  morphologic or immunophenotypic evidence of hemophagocytosis. PB flow WNL.     Abnormal transaminases.    Worsening chronic transaminitis  Reports elevated liver enzymes in "200s" since February, which were investigated by her gastroenterologist   -APAP level neg.  -Abd US showing fatty liver  -EBV Ag was positive and likely old infection, EBV PCR negative   - alpha-1 anti-trypsin (phenotype) 212, AFP 9.4   -MAYELA neg, anti-smooth muscle antibody titer high   -IR consulted for liver biopsy, scheduled for bx on 6/2 but postponed due to scheduling issues to Monday  -Appreciate hepatology recs, discussed with hepatology attending on 6/1.     Problem/Plan - 5:  ·  Problem: Tachycardia.   ·  Plan: -TTE w/ grossly normal left ventricular systolic function, normal RV   -d-dimer 947   - CTA chest with no PE  -monitor HR  dc tele.          Abnormal urine.    Questionable UTI  - UA on 5/19 with pyuria; but denies dysuria  - US with mild right hydroureter  - Urine culture without growth  - s/p ceftriaxone till 5/25 as per ID.     Problem/Plan - 8:  ·  Problem: Hypokalemia.   ·  Plan: Supplement K prn   Monitor lytes and renal function.     HTN (hypertension).   ·  Plan: Relatively hypotensive given that on triple agent regimen; Initial SBP in 80s mmHg; s/p Midodrine in ED  -BP improved, cont to monitor off BP meds.     MS (multiple sclerosis).    c/w Lyrica  c/w Amitriptyline   57yo Female with MHx of HTN, MS a/w SIRS of unclear etiology, c/b hypotension and SUSAN; Found to have transaminitis    Still's disease.    Rheumatology and Dermatology consulted  s/p IV steroids, transitioned to methylprednisolone 40mg PO daily w/ GI prophylaxis   Plan to taper steroids as outpatient   f/u w/ Rheum   -Had rash that resolved, no need for skin biopsy as per derm   -IGA elevated, salmeron ab neg  -syphilis neg, hiv neg  -s/p liver bx on 6/5, awaiting results      Systemic inflammatory response syndrome (SIRS).    Sepsis/SIRS present on admission likely d/t suspected Still's disease   Initially thought to be d/t gastroenteritis but diarrhea resolved   -had persistent fevers despite antibiotics, fevers now resolved   -started having joint pain 5/28, considering Still's disease   -blood and urine cultures NEG to date, repeat blood cultures NGTD   -GI PCR neg  -CXR w/ clear lungs   -RVP neg   -CT A/P unremarkable   -CT chest showed no pulmonary embolism. Right basilar 1.1 cm subpleural nodular opacity is indeterminate, but may represent subsegmental atelectasis. Recommend CT chest follow-up in 3 months to assess for stability/clearing.  -MRI brain showed No acute hemorrhage mass or mass effect. Developmental venous anomaly versus artifact involving the right gregory.  -ESR/CRP high,  RF neg<10,  and anti-citrullinated peptide antibody <8 ( neg)  -steroids as above   -f/u rheum.     Elevated ferritin.   Elevated Ferritin w/ bicytopenia, elevated LFT's, and persistent fevers. Likely d/t acute inflammation, ruled out HLH   -Ferritin [id number],   -cont to monitor ferritin  -triglyceride levels 205, Fibrinogen wnl   -s/p bone marrow biopsy 5/25, BM Biopsy did not show definitive morphologic or immunophenotypic evidence of hemophagocytosis. PB flow WNL.     Abnormal transaminases.   Worsening chronic transaminitis  Reports elevated liver enzymes in "200s" since February, which were investigated by her gastroenterologist   -APAP level neg.  -Abd US showing fatty liver  -EBV Ag was positive and likely old infection, EBV PCR negative   -alpha-1 anti-trypsin (phenotype) 212, AFP 9.4   -MAYELA neg, anti-smooth muscle antibody titer high   -IR consulted, s/p liver biopsy 6/5 and awaiting results   -Outpatient hepatology f/up.     Problem/Plan - 5:  ·  Problem: Tachycardia, improved   ·  Plan: -TTE w/ grossly normal left ventricular systolic function, normal RV   -d-dimer 947   - CTA chest with no PE  -monitor HR  dc tele.        Abnormal urine.    Questionable UTI  - UA on 5/19 with pyuria; but denies dysuria  - US with mild right hydroureter  - Urine culture without growth  - s/p ceftriaxone as per ID    ·  Problem: Hypokalemia.   ·  Plan: Supplement K prn   Monitor lytes and renal function.     HTN (hypertension).   ·  Plan: Relatively hypotensive given that on triple agent regimen; Initial SBP in 80s mmHg; s/p Midodrine in ED  -BP improved, cont to monitor off BP meds.     MS (multiple sclerosis).    c/w Lyrica  c/w Amitriptyline

## 2023-06-05 LAB
ALBUMIN SERPL ELPH-MCNC: 3.1 G/DL — LOW (ref 3.3–5)
ALDOLASE SERPL-CCNC: 11 U/L — HIGH (ref 3.3–10.3)
ALP SERPL-CCNC: 123 U/L — HIGH (ref 40–120)
ALT FLD-CCNC: 145 U/L — HIGH (ref 4–33)
ANION GAP SERPL CALC-SCNC: 12 MMOL/L — SIGNIFICANT CHANGE UP (ref 7–14)
AST SERPL-CCNC: 245 U/L — HIGH (ref 4–32)
BILIRUB DIRECT SERPL-MCNC: 0.2 MG/DL — SIGNIFICANT CHANGE UP (ref 0–0.3)
BILIRUB INDIRECT FLD-MCNC: 0.5 MG/DL — SIGNIFICANT CHANGE UP (ref 0–1)
BILIRUB SERPL-MCNC: 0.7 MG/DL — SIGNIFICANT CHANGE UP (ref 0.2–1.2)
BLD GP AB SCN SERPL QL: NEGATIVE — SIGNIFICANT CHANGE UP
BUN SERPL-MCNC: 8 MG/DL — SIGNIFICANT CHANGE UP (ref 7–23)
CALCIUM SERPL-MCNC: 8.6 MG/DL — SIGNIFICANT CHANGE UP (ref 8.4–10.5)
CHLORIDE SERPL-SCNC: 110 MMOL/L — HIGH (ref 98–107)
CO2 SERPL-SCNC: 22 MMOL/L — SIGNIFICANT CHANGE UP (ref 22–31)
CREAT SERPL-MCNC: 0.6 MG/DL — SIGNIFICANT CHANGE UP (ref 0.5–1.3)
EGFR: 104 ML/MIN/1.73M2 — SIGNIFICANT CHANGE UP
GLUCOSE SERPL-MCNC: 85 MG/DL — SIGNIFICANT CHANGE UP (ref 70–99)
HCT VFR BLD CALC: 32.1 % — LOW (ref 34.5–45)
HGB BLD-MCNC: 10.4 G/DL — LOW (ref 11.5–15.5)
INR BLD: 1.14 RATIO — SIGNIFICANT CHANGE UP (ref 0.88–1.16)
MAGNESIUM SERPL-MCNC: 2 MG/DL — SIGNIFICANT CHANGE UP (ref 1.6–2.6)
MCHC RBC-ENTMCNC: 31.8 PG — SIGNIFICANT CHANGE UP (ref 27–34)
MCHC RBC-ENTMCNC: 32.4 GM/DL — SIGNIFICANT CHANGE UP (ref 32–36)
MCV RBC AUTO: 98.2 FL — SIGNIFICANT CHANGE UP (ref 80–100)
NRBC # BLD: 0 /100 WBCS — SIGNIFICANT CHANGE UP (ref 0–0)
NRBC # FLD: 0.03 K/UL — HIGH (ref 0–0)
PHOSPHATE SERPL-MCNC: 3.6 MG/DL — SIGNIFICANT CHANGE UP (ref 2.5–4.5)
PLATELET # BLD AUTO: 423 K/UL — HIGH (ref 150–400)
POTASSIUM SERPL-MCNC: 4.1 MMOL/L — SIGNIFICANT CHANGE UP (ref 3.5–5.3)
POTASSIUM SERPL-SCNC: 4.1 MMOL/L — SIGNIFICANT CHANGE UP (ref 3.5–5.3)
PROT SERPL-MCNC: 6.6 G/DL — SIGNIFICANT CHANGE UP (ref 6–8.3)
PROTHROM AB SERPL-ACNC: 13.3 SEC — SIGNIFICANT CHANGE UP (ref 10.5–13.4)
RBC # BLD: 3.27 M/UL — LOW (ref 3.8–5.2)
RBC # FLD: 15.4 % — HIGH (ref 10.3–14.5)
RH IG SCN BLD-IMP: POSITIVE — SIGNIFICANT CHANGE UP
SODIUM SERPL-SCNC: 144 MMOL/L — SIGNIFICANT CHANGE UP (ref 135–145)
WBC # BLD: 13.57 K/UL — HIGH (ref 3.8–10.5)
WBC # FLD AUTO: 13.57 K/UL — HIGH (ref 3.8–10.5)

## 2023-06-05 PROCEDURE — 88341 IMHCHEM/IMCYTCHM EA ADD ANTB: CPT | Mod: 26

## 2023-06-05 PROCEDURE — 88342 IMHCHEM/IMCYTCHM 1ST ANTB: CPT | Mod: 26

## 2023-06-05 PROCEDURE — 76942 ECHO GUIDE FOR BIOPSY: CPT | Mod: 26

## 2023-06-05 PROCEDURE — 99233 SBSQ HOSP IP/OBS HIGH 50: CPT | Mod: GC

## 2023-06-05 PROCEDURE — 47000 NEEDLE BIOPSY OF LIVER PERQ: CPT

## 2023-06-05 PROCEDURE — 99232 SBSQ HOSP IP/OBS MODERATE 35: CPT | Mod: GC

## 2023-06-05 PROCEDURE — 99233 SBSQ HOSP IP/OBS HIGH 50: CPT

## 2023-06-05 PROCEDURE — 88313 SPECIAL STAINS GROUP 2: CPT | Mod: 26

## 2023-06-05 PROCEDURE — 88307 TISSUE EXAM BY PATHOLOGIST: CPT | Mod: 26

## 2023-06-05 RX ADMIN — DIPHENHYDRAMINE HYDROCHLORIDE AND LIDOCAINE HYDROCHLORIDE AND ALUMINUM HYDROXIDE AND MAGNESIUM HYDRO 10 MILLILITER(S): KIT at 22:14

## 2023-06-05 RX ADMIN — Medication 50 MILLIGRAM(S): at 15:57

## 2023-06-05 RX ADMIN — DIPHENHYDRAMINE HYDROCHLORIDE AND LIDOCAINE HYDROCHLORIDE AND ALUMINUM HYDROXIDE AND MAGNESIUM HYDRO 10 MILLILITER(S): KIT at 15:57

## 2023-06-05 RX ADMIN — DIPHENHYDRAMINE HYDROCHLORIDE AND LIDOCAINE HYDROCHLORIDE AND ALUMINUM HYDROXIDE AND MAGNESIUM HYDRO 10 MILLILITER(S): KIT at 06:00

## 2023-06-05 RX ADMIN — LORATADINE 10 MILLIGRAM(S): 10 TABLET ORAL at 06:00

## 2023-06-05 RX ADMIN — PANTOPRAZOLE SODIUM 40 MILLIGRAM(S): 20 TABLET, DELAYED RELEASE ORAL at 06:00

## 2023-06-05 RX ADMIN — LORATADINE 10 MILLIGRAM(S): 10 TABLET ORAL at 15:59

## 2023-06-05 RX ADMIN — HEPARIN SODIUM 5000 UNIT(S): 5000 INJECTION INTRAVENOUS; SUBCUTANEOUS at 22:15

## 2023-06-05 RX ADMIN — Medication 40 MILLIGRAM(S): at 06:01

## 2023-06-05 RX ADMIN — Medication 75 MILLIGRAM(S): at 15:57

## 2023-06-05 RX ADMIN — Medication 2 MILLIGRAM(S): at 08:59

## 2023-06-05 NOTE — PROGRESS NOTE ADULT - ATTENDING COMMENTS
OA hand with mild laxity -? concomitant jacouds   follow up liver biopsy, skin biopsy and xray hands  continue steroids     Kimber Collins MD  [phone]

## 2023-06-05 NOTE — PROGRESS NOTE ADULT - PROBLEM SELECTOR PLAN 2
Sepsis/SIRS present on admission, unclear etiology   Initially thought to be d/t gastroenteritis but diarrhea resolved   -persistent fevers despite antibiotics   -now w/ joint pain 5/28, considering Still's disease   -blood and urine cultures NEG to date, repeat blood cultures NGTD   -GI PCR neg  -CXR w/ clear lungs   -RVP neg   -CT A/P unremarkable   -CT chest showed no pulmonary embolism. Right basilar 1.1 cm subpleural nodular opacity is indeterminate, but may represent subsegmental atelectasis. Recommend CT chest follow-up in 3 months to assess for stability/clearing.  -MRI brain showed No acute hemorrhage mass or mass effect. Developmental venous anomaly versus artifact involving the right gregory.  -ESR/CRP high,  RF neg<10,  and anti-citrullinated peptide antibody <8 ( neg)  -started on steroids as above   -f/u rheum labs,

## 2023-06-05 NOTE — PROGRESS NOTE ADULT - PROBLEM SELECTOR PLAN 4
Worsening chronic transaminitis  Reports elevated liver enzymes in "200s" since February, which were investigated by her gastroenterologist   -APAP level neg.  -Abd US showing fatty liver  -EBV Ag was positive and likely old infection, EBV PCR negative   - alpha-1 anti-trypsin (phenotype) 212, AFP 9.4   -MAYELA neg, anti-smooth muscle antibody titer high   -IR consulted, s/p liver biopsy 6/5   -[facility] hepatology recs

## 2023-06-05 NOTE — PROGRESS NOTE ADULT - ASSESSMENT
==========INCOMPLETE==========  note/recommendations are not complete until attending signature/attestation      Differential includes Still’s disease (given fever of unknown origin) vs routine urticaria -- improved today  - Appreciate rheumatology recommendations  - No objection to high-dose steroids from a derm standpoint. Suggest 0.75-1 mg/kg prednisone dosing starting with 60 mg PO prednisone daily -- but defer steroid recommendations to rheumatology  - Will confirm that systemic steroids will not disrupt potential liver biopsy plans  - C/w loratadine 10mg PO BID and diphenhydramine q6h PRN itch, would hold for oversedation    #Seborrheic dermatitis of the face/scalp  Seborrheic dermatitis is a common inflammatory papulosquamous condition that affects the sebum-rich areas of the body, including the face, scalp, neck, upper chest, and back. The pathogenesis is not known with certainty, but it may be related to an abnormal immune response to Pityrosporum (Malassezia) yeast, a common skin commensal.  - recommend sending desmoglein serum antigens    The patient's chart was reviewed in addition to being seen and examined at bedside with the dermatology attending.  Recommendations were communicated with the primary team.  Please page [phone] with a 10-digit call-back number for further related questions.    Terry Doe MD  Resident Physician, PGY-3  [facility] Dermatology  Pager: [phone]  Office: [phone]   Differential includes Still’s disease (given fever of unknown origin) vs routine urticaria -- resolved today  - Appreciate rheumatology recommendations  - Patient currently on Methylpred and is s/p a liver biopsy  - Agree with a trial of systemic steroids  - C/w loratadine 10mg PO BID and diphenhydramine q6h PRN itch, would hold for oversedation  - C/w antihistamine for at least 1 month. We will f/u with the patient outpatient. Our office will call. She can f/u with us or her outside dermatologist about 2-4 weeks after d/c    Thank you for allowing us to participate in the care of this patient.  Please re-consult Dermatology for any new or worsening developments.    The patient's chart was reviewed in addition to being seen and examined at bedside with the dermatology attending.  Recommendations were communicated with the primary team.  Please page [phone] with a 10-digit call-back number for further related questions.    Terry Doe MD  Resident Physician, PGY-3  [facility] Dermatology  Pager: [phone]  Office: [phone]

## 2023-06-05 NOTE — PROGRESS NOTE ADULT - SUBJECTIVE AND OBJECTIVE BOX
[facility]  [id number]    INTERVAL HPI/OVERNIGHT EVENTS:  Patient is feeling better after starting corticosteroid. She is s/p liver bx.      PMHx/PSHx/FamHx/SocHx reviewed and no significant changes    REVIEW OF SYSTEMS   - reviewed with patient and  negative other than as above or previously documented.     MEDICATIONS  (STANDING):  amitriptyline 50 milliGRAM(s) Oral daily  FIRST- Mouthwash  BLM 10 milliLiter(s) Swish and Spit three times a day  heparin   Injectable 5000 Unit(s) SubCutaneous every 8 hours  heparin  Lock Flush 100 Units/mL Injectable 100 Unit(s) IV Push once  lidocaine 2% Injectable 20 milliLiter(s) Local Injection once  loratadine 10 milliGRAM(s) Oral two times a day  methylPREDNISolone sodium succinate Injectable 40 milliGRAM(s) IV Push daily  pantoprazole    Tablet 40 milliGRAM(s) Oral before breakfast  potassium chloride    Tablet ER 40 milliEquivalent(s) Oral every 4 hours  pregabalin 75 milliGRAM(s) Oral daily    MEDICATIONS  (PRN):  acetaminophen     Tablet .. 650 milliGRAM(s) Oral every 6 hours PRN Temp greater or equal to 38C (100.4F)  diphenhydrAMINE 25 milliGRAM(s) Oral every 6 hours PRN Rash and/or Itching  ibuprofen  Tablet. 400 milliGRAM(s) Oral every 8 hours PRN Temp greater or equal to 38C (100.4F)  melatonin 3 milliGRAM(s) Oral at bedtime PRN Insomnia  ondansetron Injectable 4 milliGRAM(s) IV Push every 8 hours PRN Nausea and/or Vomiting      Allergies    No Known Allergies    Intolerances          Vital Signs Last 24 Hrs  T(C): 36.7 (05 Jun 2023 10:45), Max: 36.7 (05 Jun 2023 10:45)  T(F): 98 (05 Jun 2023 10:45), Max: 98 (05 Jun 2023 10:45)  HR: 82 (05 Jun 2023 10:45) (82 - 94)  BP: 101/56 (05 Jun 2023 10:45) (101/56 - 119/63)  BP(mean): --  RR: 14 (05 Jun 2023 10:45) (14 - 17)  SpO2: 96% (05 Jun 2023 10:45) (96% - 100%)    Parameters below as of 05 Jun 2023 10:45  Patient On (Oxygen Delivery Method): mask, Venturi  O2 Flow (L/min): 3      Physical Exam:  General: NAD  HEENT: EOMI, MMM  Cardio: +S1/S2, RRR  Resp: CTA b/l  GI: +BS, soft, NT/ND  MSK: mild Jaccoud arthropathies in the DIPs b/l. No sign of tenosynovitis and good ROM  Neuro: AAOx3  Psych: wnl  Skin: significant improvement of the urticarial rash and her face.     LABS:                        10.4   13.57 )-----------( 423      ( 05 Jun 2023 05:58 )             32.1     06-05    144  |  110<H>  |  8   ----------------------------<  85  4.1   |  22  |  0.60    Ca    8.6      05 Jun 2023 05:58  Phos  3.6     06-05  Mg     2.00     06-05    TPro  6.6  /  Alb  3.1<L>  /  TBili  0.7  /  DBili  0.2  /  AST  245<H>  /  ALT  145<H>  /  AlkPhos  123<H>  06-05    PT/INR - ( 05 Jun 2023 05:58 )   PT: 13.3 sec;   INR: 1.14 ratio           HIV-1/2 Combo Result: NonreactTreponema Pallidum Antibody Interpretation: Negative: This is the recommended initial screening test for syphilis following the Neutrophil Cytoplasmic Antibody (06.02.23 @ 11:00)   Cytoplasmic (c-ANCA) Antibody: Negative  Perinuclear (p-ANCA) Antibody: Negative  Atypical ANCA: Indeterminate Method interference due to MAYELA FluorescenceAnti-Ribonuclear Protein: <0.2: Fluorescent Bead Immunoassy ANCA Reflex MPO and PROT3: DONE (06.02.23 @ 11:00) SM (Heaton) Ab FBIA: <0.2 AI (06.02.23 @ 11:00)       RADIOLOGY & ADDITIONAL TESTS:       [facility]  [id number]    INTERVAL HPI/OVERNIGHT EVENTS:  Patient is feeling better after starting corticosteroid. She is s/p liver bx.      PMHx/PSHx/FamHx/SocHx reviewed and no significant changes    REVIEW OF SYSTEMS   - reviewed with patient and  negative other than as above or previously documented.     MEDICATIONS  (STANDING):  amitriptyline 50 milliGRAM(s) Oral daily  FIRST- Mouthwash  BLM 10 milliLiter(s) Swish and Spit three times a day  heparin   Injectable 5000 Unit(s) SubCutaneous every 8 hours  heparin  Lock Flush 100 Units/mL Injectable 100 Unit(s) IV Push once  lidocaine 2% Injectable 20 milliLiter(s) Local Injection once  loratadine 10 milliGRAM(s) Oral two times a day  methylPREDNISolone sodium succinate Injectable 40 milliGRAM(s) IV Push daily  pantoprazole    Tablet 40 milliGRAM(s) Oral before breakfast  potassium chloride    Tablet ER 40 milliEquivalent(s) Oral every 4 hours  pregabalin 75 milliGRAM(s) Oral daily    MEDICATIONS  (PRN):  acetaminophen     Tablet .. 650 milliGRAM(s) Oral every 6 hours PRN Temp greater or equal to 38C (100.4F)  diphenhydrAMINE 25 milliGRAM(s) Oral every 6 hours PRN Rash and/or Itching  ibuprofen  Tablet. 400 milliGRAM(s) Oral every 8 hours PRN Temp greater or equal to 38C (100.4F)  melatonin 3 milliGRAM(s) Oral at bedtime PRN Insomnia  ondansetron Injectable 4 milliGRAM(s) IV Push every 8 hours PRN Nausea and/or Vomiting      Allergies    No Known Allergies    Intolerances  Vital Signs Last 24 Hrs  T(C): 36.7 (05 Jun 2023 10:45), Max: 36.7 (05 Jun 2023 10:45)  T(F): 98 (05 Jun 2023 10:45), Max: 98 (05 Jun 2023 10:45)  HR: 82 (05 Jun 2023 10:45) (82 - 94)  BP: 101/56 (05 Jun 2023 10:45) (101/56 - 119/63)  RR: 14 (05 Jun 2023 10:45) (14 - 17)  SpO2: 96% (05 Jun 2023 10:45) (96% - 100%)    Parameters below as of 05 Jun 2023 10:45  Patient On (Oxygen Delivery Method): mask, Venturi  O2 Flow (L/min): 3      Physical Exam:  General: NAD  HEENT: EOMI, MMM  Cardio: +S1/S2, RRR  Resp: CTA b/l  GI: +BS, soft, NT/ND  MSK: heberdens nodes in DIPs b/l with laxity (? element of jacouds). No sign of tenosynovitis and good ROM  Neuro: AAOx3  Psych: wnl  Skin: significant improvement of the urticarial rash and her face.     LABS:                        10.4   13.57 )-----------( 423      ( 05 Jun 2023 05:58 )             32.1     06-05    144  |  110<H>  |  8   ----------------------------<  85  4.1   |  22  |  0.60    Ca    8.6      05 Jun 2023 05:58  Phos  3.6     06-05  Mg     2.00     06-05    TPro  6.6  /  Alb  3.1<L>  /  TBili  0.7  /  DBili  0.2  /  AST  245<H>  /  ALT  145<H>  /  AlkPhos  123<H>  06-05    PT/INR - ( 05 Jun 2023 05:58 )   PT: 13.3 sec;   INR: 1.14 ratio           HIV-1/2 Combo Result: NonreactTreponema Pallidum Antibody Interpretation: Negative: This is the recommended initial screening test for syphilis following the Neutrophil Cytoplasmic Antibody (06.02.23 @ 11:00)   Cytoplasmic (c-ANCA) Antibody: Negative  Perinuclear (p-ANCA) Antibody: Negative  Atypical ANCA: Indeterminate Method interference due to MAYELA FluorescenceAnti-Ribonuclear Protein: <0.2: Fluorescent Bead Immunoassy ANCA Reflex MPO and PROT3: DONE (06.02.23 @ 11:00) SM (Heaton) Ab FBIA: <0.2 AI (06.02.23 @ 11:00)       RADIOLOGY & ADDITIONAL TESTS:

## 2023-06-05 NOTE — PROGRESS NOTE ADULT - ASSESSMENT
58-year-old F w/PMH of HTN, MS presented with general weakness, fever of unknown origin, rash, w/N&V. Rheumatology was consulted for further help to r/o auto-inflammatory disease.    #R/O antiinflammatory disease  Fever >3 weeks w/transaminitis, and multiple maculopapular pruritic rash  Inflammatory marker +ve ferritin >16K, IL2R 3K, CRP elevated on a setting of transaminitis   Patient on Ocrelizumab since 2018 for her MS w/o any complications  Extensive infectious workup: blood culture negative > x3 times, urine culture negative, and rapid virus panel   No sign of organomegaly or lymphadenopathies per imaging  Differentials: Schnitzler syndrome (urticarial skin rash, arthralgias), AOSD (except the type of skin rash she fulfill some of the criteria), other unusual related with paraneoplastic. Cannot r/o drug induced urticaria since this was developed during hospitalization   Discussed with dermatology and face lesions are different than her maculopapular rash  Started on methylprednisolone 40 mg 6/2-current w/significant skin rash improvement     #Transaminitis  LFT still trending up AST//145  S/P bx liver 6/5/23     Labs: negative ISH, ANCA, RNP, smith, UA, protein, SPEP, HIV, RPR     Plan  pending  Myomarker 3  If patient is doing well, we will transition to methylprednisolone PO 40 mg  Plan for skin biopsy per dermatology tomorrow  Plan for liver bx today, however it get postponed. If hepatology agrees, please start on methylprednisolone 40 mg daily   Keep on PPI and PCP prophylaxis     DW Dr. Dennis Pederson MD  PGY-4 Rheumatology Fellow  Pager: [phone]   Available in teams.   58-year-old F w/PMH of HTN, MS presented with general weakness, fever of unknown origin, rash, w/N&V. Rheumatology was consulted for further help to r/o auto-inflammatory disease.    #R/O antiinflammatory disease  Fever >3 weeks w/transaminitis, and multiple maculopapular pruritic rash  Inflammatory marker +ve ferritin >16K, IL2R 3K, CRP elevated on a setting of transaminitis   Patient on Ocrelizumab since 2018 for her MS w/o any complications  Extensive infectious workup: blood culture negative > x3 times, urine culture negative, and rapid virus panel   No sign of organomegaly or lymphadenopathies per imaging  Differentials: Schnitzler syndrome (urticarial skin rash, arthralgias), AOSD (except the type of skin rash she fulfill some of the criteria), other unusual related with paraneoplastic. Cannot r/o drug induced urticaria since this was developed during hospitalization   Discussed with dermatology and face lesions are different than her maculopapular rash  Started on methylprednisolone 40 mg 6/2-current w/significant skin rash improvement     #Transaminitis  LFT still trending up AST//145  S/P bx liver 6/5/23     Labs: negative ISH, ANCA, RNP, smith, UA, protein, SPEP, HIV, RPR     Plan  pending  Myomarker 3  If patient is doing well, we will transition to methylprednisolone PO 40 mg  Plan for skin biopsy per dermatology tomorrow  We will follow with liver biopsy result   Check hand XR b/l   Keep on PPI and PCP prophylaxis     DW Dr. Dennis Pederson MD  PGY-4 Rheumatology Fellow  Pager: [phone]   Available in teams.

## 2023-06-05 NOTE — PROGRESS NOTE ADULT - SUBJECTIVE AND OBJECTIVE BOX
PROGRESS NOTE:     Patient is a 58y old  Female who presents with a chief complaint of Fever, nausea, vomiting (05 Jun 2023 13:44)      SUBJECTIVE / OVERNIGHT EVENTS: s/p liver biopsy this AM. No new complaints.     ADDITIONAL REVIEW OF SYSTEMS:    MEDICATIONS  (STANDING):  amitriptyline 50 milliGRAM(s) Oral daily  FIRST- Mouthwash  BLM 10 milliLiter(s) Swish and Spit three times a day  heparin   Injectable 5000 Unit(s) SubCutaneous every 8 hours  heparin  Lock Flush 100 Units/mL Injectable 100 Unit(s) IV Push once  lidocaine 2% Injectable 20 milliLiter(s) Local Injection once  loratadine 10 milliGRAM(s) Oral two times a day  methylPREDNISolone sodium succinate Injectable 40 milliGRAM(s) IV Push daily  pantoprazole    Tablet 40 milliGRAM(s) Oral before breakfast  potassium chloride    Tablet ER 40 milliEquivalent(s) Oral every 4 hours  pregabalin 75 milliGRAM(s) Oral daily    MEDICATIONS  (PRN):  acetaminophen     Tablet .. 650 milliGRAM(s) Oral every 6 hours PRN Temp greater or equal to 38C (100.4F)  diphenhydrAMINE 25 milliGRAM(s) Oral every 6 hours PRN Rash and/or Itching  ibuprofen  Tablet. 400 milliGRAM(s) Oral every 8 hours PRN Temp greater or equal to 38C (100.4F)  melatonin 3 milliGRAM(s) Oral at bedtime PRN Insomnia  ondansetron Injectable 4 milliGRAM(s) IV Push every 8 hours PRN Nausea and/or Vomiting      CAPILLARY BLOOD GLUCOSE        I&O's Summary      PHYSICAL EXAM:  Vital Signs Last 24 Hrs  T(C): 36.7 (05 Jun 2023 10:45), Max: 36.7 (05 Jun 2023 10:45)  T(F): 98 (05 Jun 2023 10:45), Max: 98 (05 Jun 2023 10:45)  HR: 82 (05 Jun 2023 10:45) (82 - 94)  BP: 101/56 (05 Jun 2023 10:45) (101/56 - 119/63)  BP(mean): --  RR: 14 (05 Jun 2023 10:45) (14 - 17)  SpO2: 96% (05 Jun 2023 10:45) (96% - 100%)    Parameters below as of 05 Jun 2023 10:45  Patient On (Oxygen Delivery Method): mask, Venturi  O2 Flow (L/min): 3      CONSTITUTIONAL: NAD, well-developed  RESPIRATORY: Normal respiratory effort; lungs are clear to auscultation bilaterally  CARDIOVASCULAR: Regular rate and rhythm, normal S1 and S2, no murmur/rub/gallop; No lower extremity edema; Peripheral pulses are 2+ bilaterally  ABDOMEN: Nontender to palpation, normoactive bowel sounds, no rebound/guarding; No hepatosplenomegaly  MUSCLOSKELETAL: no clubbing or cyanosis of digits; no joint swelling or tenderness to palpation  PSYCH: A+O to person, place, and time; affect appropriate  SKIN: diffuse maculopapular rash on the legs, chest, abdomen, face much improved    LABS:                        10.4   13.57 )-----------( 423      ( 05 Jun 2023 05:58 )             32.1     06-05    144  |  110<H>  |  8   ----------------------------<  85  4.1   |  22  |  0.60    Ca    8.6      05 Jun 2023 05:58  Phos  3.6     06-05  Mg     2.00     06-05    TPro  6.6  /  Alb  3.1<L>  /  TBili  0.7  /  DBili  0.2  /  AST  245<H>  /  ALT  145<H>  /  AlkPhos  123<H>  06-05    PT/INR - ( 05 Jun 2023 05:58 )   PT: 13.3 sec;   INR: 1.14 ratio                     RADIOLOGY & ADDITIONAL TESTS:  Results Reviewed:   Imaging Personally Reviewed:  Electrocardiogram Personally Reviewed:    COORDINATION OF CARE:  Care Discussed with Consultants/Other Providers [Y/N]:  Prior or Outpatient Records Reviewed [Y/N]:

## 2023-06-05 NOTE — PROGRESS NOTE ADULT - ATTENDING COMMENTS
Diffuse papular wheals noted on the body. Circled areas mostly resolved, supporting an evanescent process. Differential includes acute urticaria vs. urticaria in the context of an underlying systemic disease. Rheumatologic diseases, such as Adult-onset Still disease may present with this type of rash. There are no obvious drug or infectious culprits which may have lead to urticaria. Cont antihistamines. Patient reports rash is no longer pruritic, though it continues to be diffuse. Appreciate rheumatology recommendations. Prednisone will likely lead to skin improvement after liver biopsy is performed.    Rash on the face/scalp is chronic and distinct. Favor psoriasis/sebopsoriasis, though seborrheic dermatitis is in the differential as well. Pemphigus foliaceous may be considered, though this is less likely. Treated as seborrheic dermatitis by an outside dermatology without significant improvement. Patient interested in a biopsy to further assess. Will plan to perform tomorrow. I do not believe this is related to the diffuse rash on the body.     Alireza Lund MD, PharmD, MPH  Co-Director, Inpatient Dermatology Consultation Service, [facility] Rash on the body is clear today. May continue antihistamines. Will defer steroid taper to rheumatology. Rash on the face/scalp is inactive today. Will hold off biopsy. Dermatology with sign off for now. Please reconsult if the rash on the face worsens. Otherwise, we may plan for outpatient follow-up after discharge.    Alireza Lund MD, PharmD, MPH  Co-Director, Inpatient Dermatology Consultation Service, [facility]

## 2023-06-05 NOTE — PRE PROCEDURE NOTE - PRE PROCEDURE EVALUATION
------------------------------------------------------------  Interventional Radiology Pre-Procedure Note  ------------------------------------------------------------  Procedure:     Indication: 58y Female with elevated LFTs of unknown origin referred for biopsy    Past Medical History:  MS (multiple sclerosis)    HTN (hypertension)        Allergies: No Known Allergies      Medications:    heparin   Injectable: 5000 Unit(s) SubCutaneous (06-04-23 @ 21:29)      Vital Signs:   T(F): 97.9 (05:45), Max: 97.9 (05:45)  HR: 94 (05:45)  BP: 119/63 (05:45)  RR: 17 (05:45)  SpO2: 100% (05:45)    Labs:           10.4  13.57)-----(423     (06-05-23 @ 05:58)         32.1     144 | 110 | 8  --------------------< 85     (06-05-23 @ 05:58)  4.1 | 22 | 0.60       PT: 13.3 06-05-23 @ 05:58  aPTT: -- 06-05-23 @ 05:58   INR: 1.14 06-05-23 @ 05:58    Imaging: reviewed    Consent: Risks, benefits, and alternatives were discussed and informed consent obtained.    Procedure Plan:   Liver parenchymal biopsy  The patient is a candidate for the procedure.      Tyler Quiroga MD  Interventional Radiology    -Available on [employer] TEAMS for all non-urgent questions  -Emergent issues: [facility]-p.[phone]; [facility]-p.[phone] ([phone])  -Non-emergent consults: Please place a [facility] order "IR Consult" with an appropriate callback number  -Scheduling questions: [facility]: [phone]; [facility]: [phone]  -Clinic/Outpatient booking: [facility]: [phone]; [facility]: [phone]

## 2023-06-05 NOTE — PROGRESS NOTE ADULT - PROBLEM SELECTOR PLAN 3
Elevated Ferritin w/ bicytopenia, elevated LFT's, and persistent fevers. Likely d/t acute inflammation, ruled out HLH   -Ferritin 45892,   -cont to monitor ferritin  -triglyceride levels 205, Fibrinogen wnl   -s/p bone marrow biopsy 5/25, BM Biopsy did not show definitive morphologic or immunophenotypic evidence of hemophagocytosis. PB flow WNL

## 2023-06-05 NOTE — PROGRESS NOTE ADULT - PROBLEM SELECTOR PLAN 1
Rheumatology and Dermatology consulted  Cont methylprednisolone 40mg daily  f/u Rheum and derm recs  - f/u rheum labs  -IGA elevated, salmeron ab neg  -syphilis neg, HIV neg  -s/p liver bx 6/5

## 2023-06-05 NOTE — PROGRESS NOTE ADULT - SUBJECTIVE AND OBJECTIVE BOX
INTERVAL HPI/OVERNIGHT EVENTS:    S:  Patient is a 58y Female Patient is a 58y old  Female who presents with a chief complaint of Fever, nausea, vomiting (01 Jun 2023 09:35)    **No overnight events.**    __________________________    REVIEW OF SYSTEMS      General: no fevers/chills, no NS	    Skin: see HPI  	  Ophthalmologic: no eye pain or change in vision    Genitourinary: no dysuria or hematuria    Musculoskeletal: no joint pains or weakness	    Neurological:no weakness or tingling        ROS negative except if noted in the above subjective text. All other systems reviewed and negative.    MEDICATIONS  (STANDING):  amitriptyline 50 milliGRAM(s) Oral daily  famotidine    Tablet 20 milliGRAM(s) Oral daily  FIRST- Mouthwash  BLM 10 milliLiter(s) Swish and Spit three times a day  heparin   Injectable 5000 Unit(s) SubCutaneous every 8 hours  heparin  Lock Flush 100 Units/mL Injectable 100 Unit(s) IV Push once  lactated ringers. 1000 milliLiter(s) (50 mL/Hr) IV Continuous <Continuous>  lidocaine 2% Injectable 20 milliLiter(s) Local Injection once  loratadine 10 milliGRAM(s) Oral two times a day  potassium chloride    Tablet ER 40 milliEquivalent(s) Oral every 4 hours  pregabalin 75 milliGRAM(s) Oral daily    MEDICATIONS  (PRN):  acetaminophen     Tablet .. 650 milliGRAM(s) Oral every 6 hours PRN Temp greater or equal to 38C (100.4F)  diphenhydrAMINE 25 milliGRAM(s) Oral every 6 hours PRN Rash and/or Itching  ibuprofen  Tablet. 400 milliGRAM(s) Oral every 8 hours PRN Temp greater or equal to 38C (100.4F)  melatonin 3 milliGRAM(s) Oral at bedtime PRN Insomnia  ondansetron Injectable 4 milliGRAM(s) IV Push every 8 hours PRN Nausea and/or Vomiting      Allergies    No Known Allergies    Intolerances      O: ICU Vital Signs Last 24 Hrs  T(C): 37.2 (01 Jun 2023 06:35), Max: 37.6 (31 May 2023 21:15)  T(F): 99 (01 Jun 2023 06:35), Max: 99.7 (31 May 2023 21:15)  HR: 108 (01 Jun 2023 06:35) (102 - 108)  BP: 94/57 (01 Jun 2023 06:35) (94/57 - 109/44)  BP(mean): --  ABP: --  ABP(mean): --  RR: 16 (01 Jun 2023 06:35) (16 - 17)  SpO2: 98% (01 Jun 2023 06:35) (96% - 98%)    O2 Parameters below as of 01 Jun 2023 06:35  Patient On (Oxygen Delivery Method): room air          I&O's Detail    31 May 2023 07:01  -  01 Jun 2023 07:00  --------------------------------------------------------  IN:    Oral Fluid: 250 mL  Total IN: 250 mL    OUT:  Total OUT: 0 mL    Total NET: 250 mL      PHYSICAL EXAM:     The patient was alert and oriented X 3, well nourished, and in no  apparent distress.  OP showed no ulcerations  There was no visible lymphadenopathy.  Conjunctiva were non injected  There was no clubbing or edema of extremities.  The scalp, hair, face, eyebrows, lips, OP, neck, chest, back,   extremities X 4, nails were examined.  There was no hyperhidrosis or bromhidrosis.      Of note on skin exam:  Scaly red patches on the eyebrows, NLFs, and frontal scalp  urticarial papules and plaques on the trunk and extremities        Labs:                       10.8   8.02  )-----------( 296      ( 31 May 2023 05:44 )             32.8     CBC Full  -  ( 31 May 2023 05:44 )  WBC Count : 8.02 K/uL  RBC Count : 3.41 M/uL  Hemoglobin : 10.8 g/dL  Hematocrit : 32.8 %  Platelet Count - Automated : 296 K/uL  Mean Cell Volume : 96.2 fL  Mean Cell Hemoglobin : 31.7 pg  Mean Cell Hemoglobin Concentration : 32.9 gm/dL  Auto Neutrophil # : 6.38 K/uL  Auto Lymphocyte # : 1.21 K/uL  Auto Monocyte # : 0.43 K/uL  Auto Eosinophil # : 0.00 K/uL  Auto Basophil # : 0.00 K/uL  Auto Neutrophil % : 69.9 %  Auto Lymphocyte % : 15.1 %  Auto Monocyte % : 5.3 %  Auto Eosinophil % : 0.0 %  Auto Basophil % : 0.0 %    05-31    132<L>  |  101  |  6<L>  ----------------------------<  90  3.3<L>   |  20<L>  |  0.51    Ca    7.9<L>      31 May 2023 05:44  Phos  2.7     05-31  Mg     2.20     05-31          CAPILLARY BLOOD GLUCOSE   INTERVAL HPI/OVERNIGHT EVENTS:    S:  Patient is a 58y Female Patient is a 58y old  Female who presents with a chief complaint of Fever, nausea, vomiting (01 Jun 2023 09:35)    **No overnight events.**    Patient says her rash has resolved.    REVIEW OF SYSTEMS      General: no fevers/chills, no NS	    Skin: see HPI  	  Ophthalmologic: no eye pain or change in vision    Genitourinary: no dysuria or hematuria    Musculoskeletal: no joint pains or weakness	    Neurological:no weakness or tingling        ROS negative except if noted in the above subjective text. All other systems reviewed and negative.    MEDICATIONS  (STANDING):  amitriptyline 50 milliGRAM(s) Oral daily  famotidine    Tablet 20 milliGRAM(s) Oral daily  FIRST- Mouthwash  BLM 10 milliLiter(s) Swish and Spit three times a day  heparin   Injectable 5000 Unit(s) SubCutaneous every 8 hours  heparin  Lock Flush 100 Units/mL Injectable 100 Unit(s) IV Push once  lactated ringers. 1000 milliLiter(s) (50 mL/Hr) IV Continuous <Continuous>  lidocaine 2% Injectable 20 milliLiter(s) Local Injection once  loratadine 10 milliGRAM(s) Oral two times a day  potassium chloride    Tablet ER 40 milliEquivalent(s) Oral every 4 hours  pregabalin 75 milliGRAM(s) Oral daily    MEDICATIONS  (PRN):  acetaminophen     Tablet .. 650 milliGRAM(s) Oral every 6 hours PRN Temp greater or equal to 38C (100.4F)  diphenhydrAMINE 25 milliGRAM(s) Oral every 6 hours PRN Rash and/or Itching  ibuprofen  Tablet. 400 milliGRAM(s) Oral every 8 hours PRN Temp greater or equal to 38C (100.4F)  melatonin 3 milliGRAM(s) Oral at bedtime PRN Insomnia  ondansetron Injectable 4 milliGRAM(s) IV Push every 8 hours PRN Nausea and/or Vomiting      Allergies    No Known Allergies    Intolerances      O: ICU Vital Signs Last 24 Hrs  T(C): 37.2 (01 Jun 2023 06:35), Max: 37.6 (31 May 2023 21:15)  T(F): 99 (01 Jun 2023 06:35), Max: 99.7 (31 May 2023 21:15)  HR: 108 (01 Jun 2023 06:35) (102 - 108)  BP: 94/57 (01 Jun 2023 06:35) (94/57 - 109/44)  BP(mean): --  ABP: --  ABP(mean): --  RR: 16 (01 Jun 2023 06:35) (16 - 17)  SpO2: 98% (01 Jun 2023 06:35) (96% - 98%)    O2 Parameters below as of 01 Jun 2023 06:35  Patient On (Oxygen Delivery Method): room air          I&O's Detail    31 May 2023 07:01  -  01 Jun 2023 07:00  --------------------------------------------------------  IN:    Oral Fluid: 250 mL  Total IN: 250 mL    OUT:  Total OUT: 0 mL    Total NET: 250 mL      PHYSICAL EXAM:     The patient was alert and oriented X 3, well nourished, and in no  apparent distress.  OP showed no ulcerations  There was no visible lymphadenopathy.  Conjunctiva were non injected  There was no clubbing or edema of extremities.  The scalp, hair, face, eyebrows, lips, OP, neck, chest, back,   extremities X 4, nails were examined.  There was no hyperhidrosis or bromhidrosis.      Of note on skin exam:  No rash noted on exam today. Skin is clear      Labs:                       10.8   8.02  )-----------( 296      ( 31 May 2023 05:44 )             32.8     CBC Full  -  ( 31 May 2023 05:44 )  WBC Count : 8.02 K/uL  RBC Count : 3.41 M/uL  Hemoglobin : 10.8 g/dL  Hematocrit : 32.8 %  Platelet Count - Automated : 296 K/uL  Mean Cell Volume : 96.2 fL  Mean Cell Hemoglobin : 31.7 pg  Mean Cell Hemoglobin Concentration : 32.9 gm/dL  Auto Neutrophil # : 6.38 K/uL  Auto Lymphocyte # : 1.21 K/uL  Auto Monocyte # : 0.43 K/uL  Auto Eosinophil # : 0.00 K/uL  Auto Basophil # : 0.00 K/uL  Auto Neutrophil % : 69.9 %  Auto Lymphocyte % : 15.1 %  Auto Monocyte % : 5.3 %  Auto Eosinophil % : 0.0 %  Auto Basophil % : 0.0 %    05-31    132<L>  |  101  |  6<L>  ----------------------------<  90  3.3<L>   |  20<L>  |  0.51    Ca    7.9<L>      31 May 2023 05:44  Phos  2.7     05-31  Mg     2.20     05-31          CAPILLARY BLOOD GLUCOSE

## 2023-06-06 LAB
ALBUMIN SERPL ELPH-MCNC: 3.2 G/DL — LOW (ref 3.3–5)
ALP SERPL-CCNC: 139 U/L — HIGH (ref 40–120)
ALT FLD-CCNC: 155 U/L — HIGH (ref 4–33)
ANION GAP SERPL CALC-SCNC: 12 MMOL/L — SIGNIFICANT CHANGE UP (ref 7–14)
AST SERPL-CCNC: 238 U/L — HIGH (ref 4–32)
BILIRUB SERPL-MCNC: 0.7 MG/DL — SIGNIFICANT CHANGE UP (ref 0.2–1.2)
BUN SERPL-MCNC: 8 MG/DL — SIGNIFICANT CHANGE UP (ref 7–23)
CALCIUM SERPL-MCNC: 8.5 MG/DL — SIGNIFICANT CHANGE UP (ref 8.4–10.5)
CHLORIDE SERPL-SCNC: 105 MMOL/L — SIGNIFICANT CHANGE UP (ref 98–107)
CO2 SERPL-SCNC: 23 MMOL/L — SIGNIFICANT CHANGE UP (ref 22–31)
CREAT SERPL-MCNC: 0.61 MG/DL — SIGNIFICANT CHANGE UP (ref 0.5–1.3)
EGFR: 104 ML/MIN/1.73M2 — SIGNIFICANT CHANGE UP
GAMMA INTERFERON BACKGROUND BLD IA-ACNC: 0.11 IU/ML — SIGNIFICANT CHANGE UP
GLUCOSE SERPL-MCNC: 86 MG/DL — SIGNIFICANT CHANGE UP (ref 70–99)
HCT VFR BLD CALC: 32.1 % — LOW (ref 34.5–45)
HGB BLD-MCNC: 10.3 G/DL — LOW (ref 11.5–15.5)
M TB IFN-G BLD-IMP: ABNORMAL
M TB IFN-G CD4+ BCKGRND COR BLD-ACNC: 0.02 IU/ML — SIGNIFICANT CHANGE UP
M TB IFN-G CD4+CD8+ BCKGRND COR BLD-ACNC: 0.01 IU/ML — SIGNIFICANT CHANGE UP
MAGNESIUM SERPL-MCNC: 2 MG/DL — SIGNIFICANT CHANGE UP (ref 1.6–2.6)
MCHC RBC-ENTMCNC: 31.8 PG — SIGNIFICANT CHANGE UP (ref 27–34)
MCHC RBC-ENTMCNC: 32.1 GM/DL — SIGNIFICANT CHANGE UP (ref 32–36)
MCV RBC AUTO: 99.1 FL — SIGNIFICANT CHANGE UP (ref 80–100)
NRBC # BLD: 0 /100 WBCS — SIGNIFICANT CHANGE UP (ref 0–0)
NRBC # FLD: 0.02 K/UL — HIGH (ref 0–0)
PHOSPHATE SERPL-MCNC: 4.1 MG/DL — SIGNIFICANT CHANGE UP (ref 2.5–4.5)
PLATELET # BLD AUTO: 380 K/UL — SIGNIFICANT CHANGE UP (ref 150–400)
POTASSIUM SERPL-MCNC: 3.7 MMOL/L — SIGNIFICANT CHANGE UP (ref 3.5–5.3)
POTASSIUM SERPL-SCNC: 3.7 MMOL/L — SIGNIFICANT CHANGE UP (ref 3.5–5.3)
PROT SERPL-MCNC: 6.5 G/DL — SIGNIFICANT CHANGE UP (ref 6–8.3)
QUANT TB PLUS MITOGEN MINUS NIL: 0.11 IU/ML — SIGNIFICANT CHANGE UP
RBC # BLD: 3.24 M/UL — LOW (ref 3.8–5.2)
RBC # FLD: 15.9 % — HIGH (ref 10.3–14.5)
SODIUM SERPL-SCNC: 140 MMOL/L — SIGNIFICANT CHANGE UP (ref 135–145)
WBC # BLD: 12.07 K/UL — HIGH (ref 3.8–10.5)
WBC # FLD AUTO: 12.07 K/UL — HIGH (ref 3.8–10.5)

## 2023-06-06 PROCEDURE — 99232 SBSQ HOSP IP/OBS MODERATE 35: CPT

## 2023-06-06 PROCEDURE — 73120 X-RAY EXAM OF HAND: CPT | Mod: 26,50

## 2023-06-06 RX ADMIN — LORATADINE 10 MILLIGRAM(S): 10 TABLET ORAL at 15:42

## 2023-06-06 RX ADMIN — DIPHENHYDRAMINE HYDROCHLORIDE AND LIDOCAINE HYDROCHLORIDE AND ALUMINUM HYDROXIDE AND MAGNESIUM HYDRO 10 MILLILITER(S): KIT at 05:26

## 2023-06-06 RX ADMIN — HEPARIN SODIUM 5000 UNIT(S): 5000 INJECTION INTRAVENOUS; SUBCUTANEOUS at 05:26

## 2023-06-06 RX ADMIN — Medication 75 MILLIGRAM(S): at 15:43

## 2023-06-06 RX ADMIN — PANTOPRAZOLE SODIUM 40 MILLIGRAM(S): 20 TABLET, DELAYED RELEASE ORAL at 05:27

## 2023-06-06 RX ADMIN — LORATADINE 10 MILLIGRAM(S): 10 TABLET ORAL at 05:27

## 2023-06-06 RX ADMIN — Medication 50 MILLIGRAM(S): at 15:42

## 2023-06-06 RX ADMIN — Medication 40 MILLIGRAM(S): at 05:28

## 2023-06-06 RX ADMIN — HEPARIN SODIUM 5000 UNIT(S): 5000 INJECTION INTRAVENOUS; SUBCUTANEOUS at 15:42

## 2023-06-06 RX ADMIN — DIPHENHYDRAMINE HYDROCHLORIDE AND LIDOCAINE HYDROCHLORIDE AND ALUMINUM HYDROXIDE AND MAGNESIUM HYDRO 10 MILLILITER(S): KIT at 15:43

## 2023-06-06 NOTE — PROGRESS NOTE ADULT - PROBLEM SELECTOR PLAN 3
Elevated Ferritin w/ bicytopenia, elevated LFT's, and persistent fevers. Likely d/t acute inflammation, ruled out HLH   -Ferritin 19536,   -cont to monitor ferritin  -triglyceride levels 205, Fibrinogen wnl   -s/p bone marrow biopsy 5/25, BM Biopsy did not show definitive morphologic or immunophenotypic evidence of hemophagocytosis. PB flow WNL

## 2023-06-06 NOTE — PROGRESS NOTE ADULT - PROBLEM SELECTOR PLAN 2
Sepsis/SIRS present on admission d/t suspected Still's disease   Initially thought to be d/t gastroenteritis but diarrhea resolved   -persistent fevers despite antibiotics   -now w/ joint pain 5/28, considering Still's disease   -blood and urine cultures NEG to date, repeat blood cultures NGTD   -GI PCR neg  -CXR w/ clear lungs   -RVP neg   -CT A/P unremarkable   -CT chest showed no pulmonary embolism. Right basilar 1.1 cm subpleural nodular opacity is indeterminate, but may represent subsegmental atelectasis. Recommend CT chest follow-up in 3 months to assess for stability/clearing.  -MRI brain showed No acute hemorrhage mass or mass effect. Developmental venous anomaly versus artifact involving the right gregory.  -ESR/CRP high,  RF neg<10,  and anti-citrullinated peptide antibody <8 ( neg)  -started on steroids as above   -f/u rheum Sepsis/SIRS present on admission d/t suspected Still's disease   Initially thought to be d/t gastroenteritis but diarrhea resolved   -had persistent fevers despite antibiotics, fevers now resolved   -now w/ joint pain 5/28, considering Still's disease   -blood and urine cultures NEG to date, repeat blood cultures NGTD   -GI PCR neg  -CXR w/ clear lungs   -RVP neg   -CT A/P unremarkable   -CT chest showed no pulmonary embolism. Right basilar 1.1 cm subpleural nodular opacity is indeterminate, but may represent subsegmental atelectasis. Recommend CT chest follow-up in 3 months to assess for stability/clearing.  -MRI brain showed No acute hemorrhage mass or mass effect. Developmental venous anomaly versus artifact involving the right gregory.  -ESR/CRP high,  RF neg<10,  and anti-citrullinated peptide antibody <8 ( neg)  -started on steroids as above   -f/u rheum

## 2023-06-06 NOTE — PROGRESS NOTE ADULT - SUBJECTIVE AND OBJECTIVE BOX
PROGRESS NOTE:     Patient is a 58y old  Female who presents with a chief complaint of Fever, nausea, vomiting (05 Jun 2023 17:07)      SUBJECTIVE / OVERNIGHT EVENTS: No new complaints.     ADDITIONAL REVIEW OF SYSTEMS:    MEDICATIONS  (STANDING):  amitriptyline 50 milliGRAM(s) Oral daily  FIRST- Mouthwash  BLM 10 milliLiter(s) Swish and Spit three times a day  heparin   Injectable 5000 Unit(s) SubCutaneous every 8 hours  heparin  Lock Flush 100 Units/mL Injectable 100 Unit(s) IV Push once  lidocaine 2% Injectable 20 milliLiter(s) Local Injection once  loratadine 10 milliGRAM(s) Oral two times a day  methylPREDNISolone sodium succinate Injectable 40 milliGRAM(s) IV Push daily  pantoprazole    Tablet 40 milliGRAM(s) Oral before breakfast  potassium chloride    Tablet ER 40 milliEquivalent(s) Oral every 4 hours  pregabalin 75 milliGRAM(s) Oral daily    MEDICATIONS  (PRN):  acetaminophen     Tablet .. 650 milliGRAM(s) Oral every 6 hours PRN Temp greater or equal to 38C (100.4F)  diphenhydrAMINE 25 milliGRAM(s) Oral every 6 hours PRN Rash and/or Itching  ibuprofen  Tablet. 400 milliGRAM(s) Oral every 8 hours PRN Temp greater or equal to 38C (100.4F)  melatonin 3 milliGRAM(s) Oral at bedtime PRN Insomnia  ondansetron Injectable 4 milliGRAM(s) IV Push every 8 hours PRN Nausea and/or Vomiting      CAPILLARY BLOOD GLUCOSE        I&O's Summary      PHYSICAL EXAM:  Vital Signs Last 24 Hrs  T(C): 36.5 (06 Jun 2023 10:23), Max: 36.5 (06 Jun 2023 10:23)  T(F): 97.7 (06 Jun 2023 10:23), Max: 97.7 (06 Jun 2023 10:23)  HR: 98 (06 Jun 2023 10:23) (76 - 102)  BP: 117/58 (06 Jun 2023 10:23) (103/54 - 117/58)  BP(mean): --  RR: 18 (06 Jun 2023 10:23) (16 - 18)  SpO2: 96% (06 Jun 2023 10:23) (94% - 96%)    Parameters below as of 06 Jun 2023 05:25  Patient On (Oxygen Delivery Method): room air      CONSTITUTIONAL: NAD, well-developed  RESPIRATORY: Normal respiratory effort; lungs are clear to auscultation bilaterally  CARDIOVASCULAR: Regular rate and rhythm, normal S1 and S2, no murmur/rub/gallop; No lower extremity edema; Peripheral pulses are 2+ bilaterally  ABDOMEN: Nontender to palpation, normoactive bowel sounds, no rebound/guarding; No hepatosplenomegaly  MUSCLOSKELETAL: no clubbing or cyanosis of digits; no joint swelling or tenderness to palpation  PSYCH: A+O to person, place, and time; affect appropriate  SKIN: diffuse maculopapular rash on the legs, chest, abdomen, face much improved    LABS:                        10.3   12.07 )-----------( 380      ( 06 Jun 2023 05:52 )             32.1     06-06    140  |  105  |  8   ----------------------------<  86  3.7   |  23  |  0.61    Ca    8.5      06 Jun 2023 05:52  Phos  4.1     06-06  Mg     2.00     06-06    TPro  6.5  /  Alb  3.2<L>  /  TBili  0.7  /  DBili  x   /  AST  238<H>  /  ALT  155<H>  /  AlkPhos  139<H>  06-06    PT/INR - ( 05 Jun 2023 05:58 )   PT: 13.3 sec;   INR: 1.14 ratio                     RADIOLOGY & ADDITIONAL TESTS:  Results Reviewed:   Imaging Personally Reviewed:  Electrocardiogram Personally Reviewed:    COORDINATION OF CARE:  Care Discussed with Consultants/Other Providers [Y/N]:  Prior or Outpatient Records Reviewed [Y/N]:

## 2023-06-06 NOTE — PROGRESS NOTE ADULT - PROBLEM SELECTOR PLAN 1
Rheumatology and Dermatology consulted  Cont methylprednisolone 40mg daily  f/u Rheum recs  -f/u Xrays of hands   -IGA elevated, salmeron ab neg  -syphilis neg, HIV neg  -s/p liver bx 6/5

## 2023-06-06 NOTE — PROGRESS NOTE ADULT - PROBLEM SELECTOR PLAN 4
Worsening chronic transaminitis  Reports elevated liver enzymes in "200s" since February, which were investigated by her gastroenterologist   -APAP level neg.  -Abd US showing fatty liver  -EBV Ag was positive and likely old infection, EBV PCR negative   -alpha-1 anti-trypsin (phenotype) 212, AFP 9.4   -MAYELA neg, anti-smooth muscle antibody titer high   -IR consulted, s/p liver biopsy 6/5   -[facility] hepatology recs

## 2023-06-07 LAB
ALBUMIN SERPL ELPH-MCNC: 3 G/DL — LOW (ref 3.3–5)
ALP SERPL-CCNC: 133 U/L — HIGH (ref 40–120)
ALT FLD-CCNC: 137 U/L — HIGH (ref 4–33)
ANION GAP SERPL CALC-SCNC: 11 MMOL/L — SIGNIFICANT CHANGE UP (ref 7–14)
AST SERPL-CCNC: 151 U/L — HIGH (ref 4–32)
B PERT DNA SPEC QL NAA+PROBE: SIGNIFICANT CHANGE UP
B PERT+PARAPERT DNA PNL SPEC NAA+PROBE: SIGNIFICANT CHANGE UP
BILIRUB SERPL-MCNC: 0.5 MG/DL — SIGNIFICANT CHANGE UP (ref 0.2–1.2)
BORDETELLA PARAPERTUSSIS (RAPRVP): SIGNIFICANT CHANGE UP
BUN SERPL-MCNC: 11 MG/DL — SIGNIFICANT CHANGE UP (ref 7–23)
C PNEUM DNA SPEC QL NAA+PROBE: SIGNIFICANT CHANGE UP
CALCIUM SERPL-MCNC: 8.3 MG/DL — LOW (ref 8.4–10.5)
CHLORIDE SERPL-SCNC: 107 MMOL/L — SIGNIFICANT CHANGE UP (ref 98–107)
CHROM ANALY OVERALL INTERP SPEC-IMP: SIGNIFICANT CHANGE UP
CK SERPL-CCNC: 16 U/L — LOW (ref 25–170)
CO2 SERPL-SCNC: 23 MMOL/L — SIGNIFICANT CHANGE UP (ref 22–31)
CREAT SERPL-MCNC: 0.61 MG/DL — SIGNIFICANT CHANGE UP (ref 0.5–1.3)
EGFR: 104 ML/MIN/1.73M2 — SIGNIFICANT CHANGE UP
FERRITIN SERPL-MCNC: 1554 NG/ML — HIGH (ref 15–150)
FLUAV SUBTYP SPEC NAA+PROBE: SIGNIFICANT CHANGE UP
FLUBV RNA SPEC QL NAA+PROBE: SIGNIFICANT CHANGE UP
GGT SERPL-CCNC: 470 U/L — HIGH (ref 5–36)
GLUCOSE SERPL-MCNC: 138 MG/DL — HIGH (ref 70–99)
HADV DNA SPEC QL NAA+PROBE: SIGNIFICANT CHANGE UP
HCOV 229E RNA SPEC QL NAA+PROBE: SIGNIFICANT CHANGE UP
HCOV HKU1 RNA SPEC QL NAA+PROBE: SIGNIFICANT CHANGE UP
HCOV NL63 RNA SPEC QL NAA+PROBE: SIGNIFICANT CHANGE UP
HCOV OC43 RNA SPEC QL NAA+PROBE: SIGNIFICANT CHANGE UP
HCT VFR BLD CALC: 31.1 % — LOW (ref 34.5–45)
HGB BLD-MCNC: 9.9 G/DL — LOW (ref 11.5–15.5)
HMPV RNA SPEC QL NAA+PROBE: SIGNIFICANT CHANGE UP
HPIV1 RNA SPEC QL NAA+PROBE: SIGNIFICANT CHANGE UP
HPIV2 RNA SPEC QL NAA+PROBE: SIGNIFICANT CHANGE UP
HPIV3 RNA SPEC QL NAA+PROBE: SIGNIFICANT CHANGE UP
HPIV4 RNA SPEC QL NAA+PROBE: SIGNIFICANT CHANGE UP
IGG SERPL-MCNC: 997 MG/DL — SIGNIFICANT CHANGE UP (ref 586–1602)
IGG1 SER-MCNC: 629 MG/DL — SIGNIFICANT CHANGE UP (ref 248–810)
IGG2 SER-MCNC: 181 MG/DL — SIGNIFICANT CHANGE UP (ref 130–555)
IGG3 SER-MCNC: 84 MG/DL — SIGNIFICANT CHANGE UP (ref 15–102)
IGG4 SER-MCNC: 13 MG/DL — SIGNIFICANT CHANGE UP (ref 2–96)
M PNEUMO DNA SPEC QL NAA+PROBE: SIGNIFICANT CHANGE UP
MAGNESIUM SERPL-MCNC: 2 MG/DL — SIGNIFICANT CHANGE UP (ref 1.6–2.6)
MCHC RBC-ENTMCNC: 30.5 PG — SIGNIFICANT CHANGE UP (ref 27–34)
MCHC RBC-ENTMCNC: 31.8 GM/DL — LOW (ref 32–36)
MCV RBC AUTO: 95.7 FL — SIGNIFICANT CHANGE UP (ref 80–100)
NRBC # BLD: 0 /100 WBCS — SIGNIFICANT CHANGE UP (ref 0–0)
NRBC # FLD: 0.02 K/UL — HIGH (ref 0–0)
PHOSPHATE SERPL-MCNC: 3.3 MG/DL — SIGNIFICANT CHANGE UP (ref 2.5–4.5)
PLATELET # BLD AUTO: 363 K/UL — SIGNIFICANT CHANGE UP (ref 150–400)
POTASSIUM SERPL-MCNC: 3.6 MMOL/L — SIGNIFICANT CHANGE UP (ref 3.5–5.3)
POTASSIUM SERPL-SCNC: 3.6 MMOL/L — SIGNIFICANT CHANGE UP (ref 3.5–5.3)
PROT SERPL-MCNC: 6.1 G/DL — SIGNIFICANT CHANGE UP (ref 6–8.3)
RAPID RVP RESULT: SIGNIFICANT CHANGE UP
RBC # BLD: 3.25 M/UL — LOW (ref 3.8–5.2)
RBC # FLD: 16 % — HIGH (ref 10.3–14.5)
RSV RNA SPEC QL NAA+PROBE: SIGNIFICANT CHANGE UP
RV+EV RNA SPEC QL NAA+PROBE: SIGNIFICANT CHANGE UP
SARS-COV-2 RNA SPEC QL NAA+PROBE: SIGNIFICANT CHANGE UP
SODIUM SERPL-SCNC: 141 MMOL/L — SIGNIFICANT CHANGE UP (ref 135–145)
WBC # BLD: 10.47 K/UL — SIGNIFICANT CHANGE UP (ref 3.8–10.5)
WBC # FLD AUTO: 10.47 K/UL — SIGNIFICANT CHANGE UP (ref 3.8–10.5)

## 2023-06-07 PROCEDURE — 99232 SBSQ HOSP IP/OBS MODERATE 35: CPT | Mod: GC

## 2023-06-07 PROCEDURE — 99232 SBSQ HOSP IP/OBS MODERATE 35: CPT

## 2023-06-07 RX ADMIN — Medication 100 MILLIGRAM(S): at 05:06

## 2023-06-07 RX ADMIN — HEPARIN SODIUM 5000 UNIT(S): 5000 INJECTION INTRAVENOUS; SUBCUTANEOUS at 14:38

## 2023-06-07 RX ADMIN — DIPHENHYDRAMINE HYDROCHLORIDE AND LIDOCAINE HYDROCHLORIDE AND ALUMINUM HYDROXIDE AND MAGNESIUM HYDRO 10 MILLILITER(S): KIT at 14:34

## 2023-06-07 RX ADMIN — HEPARIN SODIUM 5000 UNIT(S): 5000 INJECTION INTRAVENOUS; SUBCUTANEOUS at 21:33

## 2023-06-07 RX ADMIN — HEPARIN SODIUM 5000 UNIT(S): 5000 INJECTION INTRAVENOUS; SUBCUTANEOUS at 05:07

## 2023-06-07 RX ADMIN — LORATADINE 10 MILLIGRAM(S): 10 TABLET ORAL at 19:43

## 2023-06-07 RX ADMIN — Medication 50 MILLIGRAM(S): at 14:35

## 2023-06-07 RX ADMIN — PANTOPRAZOLE SODIUM 40 MILLIGRAM(S): 20 TABLET, DELAYED RELEASE ORAL at 05:07

## 2023-06-07 RX ADMIN — Medication 75 MILLIGRAM(S): at 14:34

## 2023-06-07 RX ADMIN — DIPHENHYDRAMINE HYDROCHLORIDE AND LIDOCAINE HYDROCHLORIDE AND ALUMINUM HYDROXIDE AND MAGNESIUM HYDRO 10 MILLILITER(S): KIT at 21:34

## 2023-06-07 RX ADMIN — Medication 40 MILLIGRAM(S): at 05:07

## 2023-06-07 RX ADMIN — Medication 100 MILLIGRAM(S): at 14:34

## 2023-06-07 RX ADMIN — DIPHENHYDRAMINE HYDROCHLORIDE AND LIDOCAINE HYDROCHLORIDE AND ALUMINUM HYDROXIDE AND MAGNESIUM HYDRO 10 MILLILITER(S): KIT at 05:06

## 2023-06-07 RX ADMIN — LORATADINE 10 MILLIGRAM(S): 10 TABLET ORAL at 05:06

## 2023-06-07 NOTE — PROGRESS NOTE ADULT - PROBLEM SELECTOR PLAN 1
Rheumatology and Dermatology consulted  -Cont methylprednisolone 40mg daily w/ GI prophylaxis   -f/u Rheum recs  -Xrays of hands w/ variable degree bilateral DIP arthritic changes some with erosive OA component   -IGA elevated, salmeron ab neg  -syphilis neg, HIV neg  -s/p liver bx 6/5, f/up path

## 2023-06-07 NOTE — PROGRESS NOTE ADULT - PROBLEM SELECTOR PLAN 4
Worsening chronic transaminitis  Reports elevated liver enzymes in "200s" since February, which were investigated by her gastroenterologist   -APAP level neg.  -Abd US showing fatty liver  -EBV Ag was positive and likely old infection, EBV PCR negative   -alpha-1 anti-trypsin (phenotype) 212, AFP 9.4   -MAYELA neg, anti-smooth muscle antibody titer high   -IR consulted, s/p liver biopsy 6/5 and awaiting results   -Appreciate hepatology recs

## 2023-06-07 NOTE — PROGRESS NOTE ADULT - ASSESSMENT
58-year-old F w/PMH of HTN, MS presented with general weakness, fever of unknown origin, rash, w/N&V. Rheumatology was consulted for further help to r/o auto-inflammatory disease.    #R/O antiinflammatory disease  Fever >3 weeks w/transaminitis, and multiple maculopapular pruritic rash  Inflammatory marker +ve ferritin >16K, IL2R 3K, CRP elevated on a setting of transaminitis   Patient on Ocrelizumab since 2018 for her MS w/o any complications  Extensive infectious workup: blood culture negative > x3 times, urine culture negative, and rapid virus panel   No sign of organomegaly or lymphadenopathies per imaging  Differentials: Schnitzler syndrome (urticarial skin rash, arthralgias), AOSD (except the type of skin rash she fulfill some of the criteria), other unusual related with paraneoplastic. Cannot r/o drug induced urticaria since this was developed during hospitalization   Discussed with dermatology and face lesions are different than her maculopapular rash  Started on methylprednisolone 40 mg 6/2-current w/significant skin rash improvement     #Transaminitis  LFT started improving now ~100  S/P bx liver 6/5/23     Labs: negative ISH, ANCA, RNP, smith, UA, protein, SPEP, HIV, RPR     Plan  pending  Myomarker 3  Plan to transition to methylprednisolone PO 40 mg and we will provide tapering dose to continue over 4 weeks. Patient will need to follow w/us. We will make a schedule for her  Patient will need to follow with dermatology   We will follow with liver biopsy result   Keep on PPI and PCP prophylaxis   Ok to be DC from our standpoint    Note is incomplete, please wait for attending attestation  DW Dr. Dennis Pederson MD  PGY-4 Rheumatology Fellow  Pager: [phone]   Available in teams. 58-year-old F w/PMH of HTN, MS presented with general weakness, fever of unknown origin, rash, w/N&V. Rheumatology was consulted for further help to r/o auto-inflammatory disease.    #R/O antiinflammatory disease  Fever >3 weeks w/transaminitis, and multiple maculopapular pruritic rash  Inflammatory marker +ve ferritin >16K, IL2R 3K, CRP elevated on a setting of transaminitis   Patient on Ocrelizumab since 2018 for her MS w/o any complications  Extensive infectious workup: blood culture negative > x3 times, urine culture negative, and rapid virus panel   No sign of organomegaly or lymphadenopathies per imaging  Differentials: Unclear etiology but possible differentials Schnitzler syndrome (urticarial skin rash, arthralgias), AOSD (except the type of skin rash she fulfill some of the criteria), other unusual related with paraneoplastic. Cannot r/o drug induced urticaria since this was developed during hospitalization   Discussed with dermatology and face lesions are different than her maculopapular rash  Started on methylprednisolone 40 mg 6/2-current w/significant skin rash improvement     #Transaminitis  LFT started improving now ~100  S/P bx liver 6/5/23     Labs: negative ISH, ANCA, RNP, smith, UA, protein, SPEP, HIV, RPR     Plan  pending Myomarker 3  Plan to transition to methylprednisolone PO 40 mg and we will provide tapering dose. Patient will need to follow w/us. We will make a schedule for her  Patient will need to follow with dermatology   We will follow with liver biopsy result   Keep on PPI and PCP prophylaxis   Ok to be DC from our standpoint    Note is incomplete, please wait for attending attestation  DW Dr. Dennis Pederson MD  PGY-4 Rheumatology Fellow  Pager: [phone]   Available in teams.

## 2023-06-07 NOTE — PROGRESS NOTE ADULT - ATTENDING COMMENTS
agree with above   unclear etiology of elevated LFT and fevers(resolved) and rash (resolved)   On steroids with significant improvement   will need to coordinate with outpatient neuro (at [facility]) for steroid tapering.   Follow up liver biopsy     Kimber Collins MD

## 2023-06-07 NOTE — PROGRESS NOTE ADULT - PROBLEM SELECTOR PLAN 3
Elevated Ferritin w/ bicytopenia, elevated LFT's, and persistent fevers. Likely d/t acute inflammation, ruled out HLH   -Ferritin 62392,   -cont to monitor ferritin  -triglyceride levels 205, Fibrinogen wnl   -s/p bone marrow biopsy 5/25, BM Biopsy did not show definitive morphologic or immunophenotypic evidence of hemophagocytosis. PB flow WNL

## 2023-06-07 NOTE — PROGRESS NOTE ADULT - PROBLEM SELECTOR PLAN 2
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 Right basilar 1.1 cm subpleural nodular opacity is indeterminate, but may represent subsegmental atelectasis. Recommend CT chest follow-up in 3 months to assess for stability/clearing.  -MRI brain showed No acute hemorrhage mass or mass effect. Developmental venous anomaly versus artifact involving the right gregory.  -ESR/CRP high,  RF neg<10,  and anti-citrullinated peptide antibody <8 ( neg)  -started on steroids as above   -f/u rheum

## 2023-06-07 NOTE — PROGRESS NOTE ADULT - SUBJECTIVE AND OBJECTIVE BOX
PROGRESS NOTE:     Patient is a 58y old  Female who presents with a chief complaint of Fever, nausea, vomiting (06 Jun 2023 13:36)      SUBJECTIVE / OVERNIGHT EVENTS: No acute events.     ADDITIONAL REVIEW OF SYSTEMS:    MEDICATIONS  (STANDING):  amitriptyline 50 milliGRAM(s) Oral daily  FIRST- Mouthwash  BLM 10 milliLiter(s) Swish and Spit three times a day  heparin   Injectable 5000 Unit(s) SubCutaneous every 8 hours  heparin  Lock Flush 100 Units/mL Injectable 100 Unit(s) IV Push once  lidocaine 2% Injectable 20 milliLiter(s) Local Injection once  loratadine 10 milliGRAM(s) Oral two times a day  methylPREDNISolone sodium succinate Injectable 40 milliGRAM(s) IV Push daily  pantoprazole    Tablet 40 milliGRAM(s) Oral before breakfast  potassium chloride    Tablet ER 40 milliEquivalent(s) Oral every 4 hours  pregabalin 75 milliGRAM(s) Oral daily    MEDICATIONS  (PRN):  acetaminophen     Tablet .. 650 milliGRAM(s) Oral every 6 hours PRN Temp greater or equal to 38C (100.4F)  diphenhydrAMINE 25 milliGRAM(s) Oral every 6 hours PRN Rash and/or Itching  guaiFENesin Oral Liquid (Sugar-Free) 100 milliGRAM(s) Oral every 6 hours PRN Cough  ibuprofen  Tablet. 400 milliGRAM(s) Oral every 8 hours PRN Temp greater or equal to 38C (100.4F)  melatonin 3 milliGRAM(s) Oral at bedtime PRN Insomnia  ondansetron Injectable 4 milliGRAM(s) IV Push every 8 hours PRN Nausea and/or Vomiting      CAPILLARY BLOOD GLUCOSE        I&O's Summary      PHYSICAL EXAM:  Vital Signs Last 24 Hrs  T(C): 36.6 (07 Jun 2023 10:26), Max: 36.7 (06 Jun 2023 20:00)  T(F): 97.9 (07 Jun 2023 10:26), Max: 98.1 (07 Jun 2023 05:00)  HR: 94 (07 Jun 2023 10:26) (88 - 100)  BP: 112/62 (07 Jun 2023 10:26) (109/79 - 119/72)  BP(mean): --  RR: 16 (07 Jun 2023 10:26) (16 - 16)  SpO2: 96% (07 Jun 2023 10:26) (96% - 97%)    Parameters below as of 07 Jun 2023 05:00  Patient On (Oxygen Delivery Method): room air      CONSTITUTIONAL: NAD, well-developed  RESPIRATORY: Normal respiratory effort; lungs are clear to auscultation bilaterally  CARDIOVASCULAR: Regular rate and rhythm, normal S1 and S2, no murmur/rub/gallop; No lower extremity edema; Peripheral pulses are 2+ bilaterally  ABDOMEN: Nontender to palpation, normoactive bowel sounds, no rebound/guarding; No hepatosplenomegaly  MUSCLOSKELETAL: no clubbing or cyanosis of digits; no joint swelling or tenderness to palpation  PSYCH: A+O to person, place, and time; affect appropriate  SKIN: No rash     LABS:                        9.9    10.47 )-----------( 363      ( 07 Jun 2023 03:23 )             31.1     06-07    141  |  107  |  11  ----------------------------<  138<H>  3.6   |  23  |  0.61    Ca    8.3<L>      07 Jun 2023 03:23  Phos  3.3     06-07  Mg     2.00     06-07    TPro  6.1  /  Alb  3.0<L>  /  TBili  0.5  /  DBili  x   /  AST  151<H>  /  ALT  137<H>  /  AlkPhos  133<H>  06-07      CARDIAC MARKERS ( 07 Jun 2023 03:23 )  x     / x     / 16 U/L / x     / x                RADIOLOGY & ADDITIONAL TESTS:  Results Reviewed:   Imaging Personally Reviewed:  Electrocardiogram Personally Reviewed:    COORDINATION OF CARE:  Care Discussed with Consultants/Other Providers [Y/N]:  Prior or Outpatient Records Reviewed [Y/N]:

## 2023-06-07 NOTE — PROGRESS NOTE ADULT - SUBJECTIVE AND OBJECTIVE BOX
[facility]  [id number]    INTERVAL HPI/OVERNIGHT EVENTS:  Patient is feeling well. No new rash.       PMHx/PSHx/FamHx/SocHx reviewed and no significant changes    REVIEW OF SYSTEMS   - reviewed with patient and  negative other than as above or previously documented.     MEDICATIONS  (STANDING):  amitriptyline 50 milliGRAM(s) Oral daily  FIRST- Mouthwash  BLM 10 milliLiter(s) Swish and Spit three times a day  heparin   Injectable 5000 Unit(s) SubCutaneous every 8 hours  heparin  Lock Flush 100 Units/mL Injectable 100 Unit(s) IV Push once  lidocaine 2% Injectable 20 milliLiter(s) Local Injection once  loratadine 10 milliGRAM(s) Oral two times a day  methylPREDNISolone sodium succinate Injectable 40 milliGRAM(s) IV Push daily  pantoprazole    Tablet 40 milliGRAM(s) Oral before breakfast  potassium chloride    Tablet ER 40 milliEquivalent(s) Oral every 4 hours  pregabalin 75 milliGRAM(s) Oral daily    MEDICATIONS  (PRN):  acetaminophen     Tablet .. 650 milliGRAM(s) Oral every 6 hours PRN Temp greater or equal to 38C (100.4F)  diphenhydrAMINE 25 milliGRAM(s) Oral every 6 hours PRN Rash and/or Itching  guaiFENesin Oral Liquid (Sugar-Free) 100 milliGRAM(s) Oral every 6 hours PRN Cough  ibuprofen  Tablet. 400 milliGRAM(s) Oral every 8 hours PRN Temp greater or equal to 38C (100.4F)  melatonin 3 milliGRAM(s) Oral at bedtime PRN Insomnia  ondansetron Injectable 4 milliGRAM(s) IV Push every 8 hours PRN Nausea and/or Vomiting      Allergies    No Known Allergies    Intolerances          Vital Signs Last 24 Hrs  T(C): 36.6 (07 Jun 2023 10:26), Max: 36.7 (06 Jun 2023 20:00)  T(F): 97.9 (07 Jun 2023 10:26), Max: 98.1 (07 Jun 2023 05:00)  HR: 94 (07 Jun 2023 10:26) (88 - 100)  BP: 112/62 (07 Jun 2023 10:26) (109/79 - 119/72)  BP(mean): --  RR: 16 (07 Jun 2023 10:26) (16 - 16)  SpO2: 96% (07 Jun 2023 10:26) (96% - 97%)    Parameters below as of 07 Jun 2023 05:00  Patient On (Oxygen Delivery Method): room air        Physical Exam:  General: NAD  HEENT: EOMI, MMM  Cardio: +S1/S2, RRR  Resp: CTA b/l  GI: +BS, soft, NT/ND  MSK: Heberden's nodules in 5th finger, no sign of decrease ROM  Neuro: AAOx3  Psych: wnl    LABS:                        9.9    10.47 )-----------( 363      ( 07 Jun 2023 03:23 )             31.1     06-07    141  |  107  |  11  ----------------------------<  138<H>  3.6   |  23  |  0.61    Ca    8.3<L>      07 Jun 2023 03:23  Phos  3.3     06-07  Mg     2.00     06-07    TPro  6.1  /  Alb  3.0<L>  /  TBili  0.5  /  DBili  x   /  AST  151<H>  /  ALT  137<H>  /  AlkPhos  133<H>  06-07  Ferritin, Serum: 1554 ng/mL (06.07.23 @ 03:23) Gamma Glutamyl Transferase, Serum (06.07.23 @ 03:23)   Gamma Glutamyl Transferase, Serum: 470 U/L          RADIOLOGY & ADDITIONAL TESTS:  < from: Xray Hand 2 Views, Bilateral (06.06.23 @ 10:22) >  No fractures or dislocations.    Variable degree bilateral DIP arthritic changes some with erosive OA   component. Left greater than right STT joint space narrowing. Preserved   remaining joint spaces and no additional joint margin erosions.    Carpal bones normally aligned.  Neutral ulnar variance.  No lytic or blastic lesions.    < end of copied text >       [facility]  [id number]    INTERVAL HPI/OVERNIGHT EVENTS:  Patient is feeling well. No new rash.     PMHx/PSHx/FamHx/SocHx reviewed and no significant changes    REVIEW OF SYSTEMS   - reviewed with patient and  negative other than as above or previously documented.     MEDICATIONS  (STANDING):  amitriptyline 50 milliGRAM(s) Oral daily  FIRST- Mouthwash  BLM 10 milliLiter(s) Swish and Spit three times a day  heparin   Injectable 5000 Unit(s) SubCutaneous every 8 hours  heparin  Lock Flush 100 Units/mL Injectable 100 Unit(s) IV Push once  lidocaine 2% Injectable 20 milliLiter(s) Local Injection once  loratadine 10 milliGRAM(s) Oral two times a day  methylPREDNISolone sodium succinate Injectable 40 milliGRAM(s) IV Push daily  pantoprazole    Tablet 40 milliGRAM(s) Oral before breakfast  potassium chloride    Tablet ER 40 milliEquivalent(s) Oral every 4 hours  pregabalin 75 milliGRAM(s) Oral daily    MEDICATIONS  (PRN):  acetaminophen     Tablet .. 650 milliGRAM(s) Oral every 6 hours PRN Temp greater or equal to 38C (100.4F)  diphenhydrAMINE 25 milliGRAM(s) Oral every 6 hours PRN Rash and/or Itching  guaiFENesin Oral Liquid (Sugar-Free) 100 milliGRAM(s) Oral every 6 hours PRN Cough  ibuprofen  Tablet. 400 milliGRAM(s) Oral every 8 hours PRN Temp greater or equal to 38C (100.4F)  melatonin 3 milliGRAM(s) Oral at bedtime PRN Insomnia  ondansetron Injectable 4 milliGRAM(s) IV Push every 8 hours PRN Nausea and/or Vomiting      Allergies:  No Known Allergies    VITALS:  BP: 112/62 (07 Jun 2023 10:26) (109/79 - 119/72)  RR: 16 (07 Jun 2023 10:26) (16 - 16)  SpO2: 96% (07 Jun 2023 10:26) (96% - 97%)    Parameters below as of 07 Jun 2023 05:00  Patient On (Oxygen Delivery Method): room air        Physical Exam:  General: NAD  HEENT: EOMI, MMM  Cardio: +S1/S2, RRR  Resp: CTA b/l  GI: +BS, soft, NT/ND  MSK: Heberden's nodules in 5th finger, no sign of decrease ROM  Neuro: AAOx3  Psych: wnl    LABS:                        9.9    10.47 )-----------( 363      ( 07 Jun 2023 03:23 )             31.1     06-07    141  |  107  |  11  ----------------------------<  138<H>  3.6   |  23  |  0.61    Ca    8.3<L>      07 Jun 2023 03:23  Phos  3.3     06-07  Mg     2.00     06-07    TPro  6.1  /  Alb  3.0<L>  /  TBili  0.5  /  DBili  x   /  AST  151<H>  /  ALT  137<H>  /  AlkPhos  133<H>  06-07  Ferritin, Serum: 1554 ng/mL (06.07.23 @ 03:23) Gamma Glutamyl Transferase, Serum (06.07.23 @ 03:23)   Gamma Glutamyl Transferase, Serum: 470 U/L          RADIOLOGY & ADDITIONAL TESTS:  < from: Xray Hand 2 Views, Bilateral (06.06.23 @ 10:22) >  No fractures or dislocations.    Variable degree bilateral DIP arthritic changes some with erosive OA   component. Left greater than right STT joint space narrowing. Preserved   remaining joint spaces and no additional joint margin erosions.    Carpal bones normally aligned.  Neutral ulnar variance.  No lytic or blastic lesions.    < end of copied text >

## 2023-06-08 ENCOUNTER — TRANSCRIPTION ENCOUNTER (OUTPATIENT)
Age: 58
End: 2023-06-08

## 2023-06-08 VITALS
OXYGEN SATURATION: 99 % | RESPIRATION RATE: 18 BRPM | SYSTOLIC BLOOD PRESSURE: 137 MMHG | TEMPERATURE: 98 F | DIASTOLIC BLOOD PRESSURE: 69 MMHG | HEART RATE: 91 BPM

## 2023-06-08 LAB
% ALBUMIN: 43.9 % — SIGNIFICANT CHANGE UP
% ALPHA 1: 5.9 % — SIGNIFICANT CHANGE UP
% ALPHA 2: 11.3 % — SIGNIFICANT CHANGE UP
% BETA: 17.3 % — SIGNIFICANT CHANGE UP
% GAMMA: 21.6 % — SIGNIFICANT CHANGE UP
% M SPIKE: 4.2 % — SIGNIFICANT CHANGE UP
ALBUMIN SERPL ELPH-MCNC: 2.5 G/DL — LOW (ref 3.3–4.4)
ALBUMIN SERPL ELPH-MCNC: 3.2 G/DL — LOW (ref 3.3–5)
ALBUMIN/GLOB SERPL ELPH: 0.8 RATIO — SIGNIFICANT CHANGE UP
ALP SERPL-CCNC: 129 U/L — HIGH (ref 40–120)
ALPHA1 GLOB SERPL ELPH-MCNC: 0.34 G/DL — HIGH (ref 0.1–0.3)
ALPHA2 GLOB SERPL ELPH-MCNC: 0.6 G/DL — SIGNIFICANT CHANGE UP (ref 0.6–1)
ALT FLD-CCNC: 133 U/L — HIGH (ref 4–33)
ANION GAP SERPL CALC-SCNC: 12 MMOL/L — SIGNIFICANT CHANGE UP (ref 7–14)
AST SERPL-CCNC: 141 U/L — HIGH (ref 4–32)
B-GLOBULIN SERPL ELPH-MCNC: 0.99 G/DL — SIGNIFICANT CHANGE UP (ref 0.6–1.1)
BILIRUB SERPL-MCNC: 0.5 MG/DL — SIGNIFICANT CHANGE UP (ref 0.2–1.2)
BUN SERPL-MCNC: 11 MG/DL — SIGNIFICANT CHANGE UP (ref 7–23)
CALCIUM SERPL-MCNC: 8.5 MG/DL — SIGNIFICANT CHANGE UP (ref 8.4–10.5)
CHLORIDE SERPL-SCNC: 106 MMOL/L — SIGNIFICANT CHANGE UP (ref 98–107)
CO2 SERPL-SCNC: 25 MMOL/L — SIGNIFICANT CHANGE UP (ref 22–31)
CREAT SERPL-MCNC: 0.58 MG/DL — SIGNIFICANT CHANGE UP (ref 0.5–1.3)
EGFR: 105 ML/MIN/1.73M2 — SIGNIFICANT CHANGE UP
GAMMA GLOBULIN: 1.23 G/DL — SIGNIFICANT CHANGE UP (ref 0.7–1.7)
GLUCOSE SERPL-MCNC: 84 MG/DL — SIGNIFICANT CHANGE UP (ref 70–99)
HCT VFR BLD CALC: 31.4 % — LOW (ref 34.5–45)
HGB BLD-MCNC: 10.1 G/DL — LOW (ref 11.5–15.5)
M-SPIKE: 0.2 G/DL — SIGNIFICANT CHANGE UP
MAGNESIUM SERPL-MCNC: 2 MG/DL — SIGNIFICANT CHANGE UP (ref 1.6–2.6)
MCHC RBC-ENTMCNC: 30.9 PG — SIGNIFICANT CHANGE UP (ref 27–34)
MCHC RBC-ENTMCNC: 32.2 GM/DL — SIGNIFICANT CHANGE UP (ref 32–36)
MCV RBC AUTO: 96 FL — SIGNIFICANT CHANGE UP (ref 80–100)
NRBC # BLD: 0 /100 WBCS — SIGNIFICANT CHANGE UP (ref 0–0)
NRBC # FLD: 0 K/UL — SIGNIFICANT CHANGE UP (ref 0–0)
PHOSPHATE SERPL-MCNC: 3.8 MG/DL — SIGNIFICANT CHANGE UP (ref 2.5–4.5)
PLATELET # BLD AUTO: 330 K/UL — SIGNIFICANT CHANGE UP (ref 150–400)
POTASSIUM SERPL-MCNC: 3.6 MMOL/L — SIGNIFICANT CHANGE UP (ref 3.5–5.3)
POTASSIUM SERPL-SCNC: 3.6 MMOL/L — SIGNIFICANT CHANGE UP (ref 3.5–5.3)
PROT PATTERN SERPL ELPH-IMP: SIGNIFICANT CHANGE UP
PROT SERPL-MCNC: 5.7 G/DL — SIGNIFICANT CHANGE UP
PROT SERPL-MCNC: 6.4 G/DL — SIGNIFICANT CHANGE UP (ref 6–8.3)
RBC # BLD: 3.27 M/UL — LOW (ref 3.8–5.2)
RBC # FLD: 16.1 % — HIGH (ref 10.3–14.5)
SODIUM SERPL-SCNC: 143 MMOL/L — SIGNIFICANT CHANGE UP (ref 135–145)
WBC # BLD: 9.58 K/UL — SIGNIFICANT CHANGE UP (ref 3.8–10.5)
WBC # FLD AUTO: 9.58 K/UL — SIGNIFICANT CHANGE UP (ref 3.8–10.5)

## 2023-06-08 PROCEDURE — 99239 HOSP IP/OBS DSCHRG MGMT >30: CPT

## 2023-06-08 RX ORDER — PANTOPRAZOLE SODIUM 20 MG/1
1 TABLET, DELAYED RELEASE ORAL
Qty: 30 | Refills: 0
Start: 2023-06-08 | End: 2023-07-07

## 2023-06-08 RX ORDER — LORATADINE 10 MG/1
1 TABLET ORAL
Qty: 60 | Refills: 0
Start: 2023-06-08 | End: 2023-07-07

## 2023-06-08 RX ADMIN — Medication 40 MILLIGRAM(S): at 05:40

## 2023-06-08 RX ADMIN — Medication 75 MILLIGRAM(S): at 14:47

## 2023-06-08 RX ADMIN — DIPHENHYDRAMINE HYDROCHLORIDE AND LIDOCAINE HYDROCHLORIDE AND ALUMINUM HYDROXIDE AND MAGNESIUM HYDRO 10 MILLILITER(S): KIT at 05:40

## 2023-06-08 RX ADMIN — HEPARIN SODIUM 5000 UNIT(S): 5000 INJECTION INTRAVENOUS; SUBCUTANEOUS at 05:41

## 2023-06-08 RX ADMIN — PANTOPRAZOLE SODIUM 40 MILLIGRAM(S): 20 TABLET, DELAYED RELEASE ORAL at 05:39

## 2023-06-08 RX ADMIN — LORATADINE 10 MILLIGRAM(S): 10 TABLET ORAL at 05:39

## 2023-06-08 RX ADMIN — Medication 50 MILLIGRAM(S): at 14:49

## 2023-06-08 NOTE — DISCHARGE NOTE NURSING/CASE MANAGEMENT/SOCIAL WORK - PATIENT PORTAL LINK FT
You can access the FollowMyHealth Patient Portal offered by [facility] by registering at the following website: [email or web address]. By joining [facility]’s FollowMyHealth portal, you will also be able to view your health information using other applications (apps) compatible with our system.

## 2023-06-08 NOTE — PROGRESS NOTE ADULT - PROBLEM SELECTOR PROBLEM 3
Abnormal transaminases
Elevated ferritin
Gastroenteritis, infectious
Elevated ferritin
Abnormal transaminases
Abnormal transaminases
Elevated ferritin
Abnormal transaminases
Abnormal transaminases
Elevated ferritin
Abnormal transaminases
Gastroenteritis, infectious

## 2023-06-08 NOTE — PROGRESS NOTE ADULT - PROBLEM SELECTOR PROBLEM 7
Hypokalemia
Abnormal urine
Hypokalemia
HTN (hypertension)
Abnormal urine
Hypokalemia
Hypokalemia
Abnormal urine
Hypokalemia
Abnormal urine
Hypokalemia
Hypokalemia
HTN (hypertension)
HTN (hypertension)
Hypokalemia

## 2023-06-08 NOTE — DISCHARGE NOTE NURSING/CASE MANAGEMENT/SOCIAL WORK - NSDCFUADDAPPT_GEN_ALL_CORE_FT
Dermatology office will call you to schedule follow up appointment. You can follow up with [facility] or your outside dermatologist about 2-4 weeks after discharge.    Follow up with Rheumatology - they will follow up on your liver biopsy    Outpatient follow up with your Neurologist Dr. Nayan Holly at [place] - will need to discuss about tapering down the steroids.

## 2023-06-08 NOTE — PROGRESS NOTE ADULT - PROBLEM SELECTOR PROBLEM 9
Encounter for deep vein thrombosis (DVT) prophylaxis
MS (multiple sclerosis)
HTN (hypertension)
Encounter for deep vein thrombosis (DVT) prophylaxis
MS (multiple sclerosis)
HTN (hypertension)
MS (multiple sclerosis)
Encounter for deep vein thrombosis (DVT) prophylaxis
MS (multiple sclerosis)
HTN (hypertension)
MS (multiple sclerosis)
HTN (hypertension)
MS (multiple sclerosis)

## 2023-06-08 NOTE — PROGRESS NOTE ADULT - PROVIDER SPECIALTY LIST ADULT
Hospitalist
Infectious Disease
Dermatology
Hepatology
Infectious Disease
Rheumatology
Heme/Onc
Heme/Onc
Hepatology
Infectious Disease
Infectious Disease
Rheumatology
Dermatology
Hospitalist

## 2023-06-08 NOTE — PROGRESS NOTE ADULT - PROBLEM SELECTOR PROBLEM 11
Encounter for deep vein thrombosis (DVT) prophylaxis

## 2023-06-08 NOTE — PROGRESS NOTE ADULT - PROBLEM SELECTOR PLAN 2
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 Right basilar 1.1 cm subpleural nodular opacity is indeterminate, but may represent subsegmental atelectasis. Recommend CT chest follow-up in 3 months to assess for stability/clearing.  -MRI brain showed No acute hemorrhage mass or mass effect. Developmental venous anomaly versus artifact involving the right gregory.  -ESR/CRP high,  RF neg<10,  and anti-citrullinated peptide antibody <8 ( neg)  -steroids as above   -f/u rheum

## 2023-06-08 NOTE — PROGRESS NOTE ADULT - SUBJECTIVE AND OBJECTIVE BOX
PROGRESS NOTE:     Patient is a 58y old  Female who presents with a chief complaint of Fever, nausea, vomiting (07 Jun 2023 15:36)      SUBJECTIVE / OVERNIGHT EVENTS: No new complaints.     ADDITIONAL REVIEW OF SYSTEMS:    MEDICATIONS  (STANDING):  amitriptyline 50 milliGRAM(s) Oral daily  FIRST- Mouthwash  BLM 10 milliLiter(s) Swish and Spit three times a day  heparin   Injectable 5000 Unit(s) SubCutaneous every 8 hours  heparin  Lock Flush 100 Units/mL Injectable 100 Unit(s) IV Push once  lidocaine 2% Injectable 20 milliLiter(s) Local Injection once  loratadine 10 milliGRAM(s) Oral two times a day  methylPREDNISolone 40 milliGRAM(s) Oral daily  pantoprazole    Tablet 40 milliGRAM(s) Oral before breakfast  potassium chloride    Tablet ER 40 milliEquivalent(s) Oral every 4 hours  pregabalin 75 milliGRAM(s) Oral daily    MEDICATIONS  (PRN):  acetaminophen     Tablet .. 650 milliGRAM(s) Oral every 6 hours PRN Temp greater or equal to 38C (100.4F)  diphenhydrAMINE 25 milliGRAM(s) Oral every 6 hours PRN Rash and/or Itching  guaiFENesin Oral Liquid (Sugar-Free) 100 milliGRAM(s) Oral every 6 hours PRN Cough  ibuprofen  Tablet. 400 milliGRAM(s) Oral every 8 hours PRN Temp greater or equal to 38C (100.4F)  melatonin 3 milliGRAM(s) Oral at bedtime PRN Insomnia  ondansetron Injectable 4 milliGRAM(s) IV Push every 8 hours PRN Nausea and/or Vomiting      CAPILLARY BLOOD GLUCOSE        I&O's Summary      PHYSICAL EXAM:  Vital Signs Last 24 Hrs  T(C): 36.5 (08 Jun 2023 12:41), Max: 37.5 (08 Jun 2023 05:30)  T(F): 97.7 (08 Jun 2023 12:41), Max: 99.5 (08 Jun 2023 05:30)  HR: 86 (08 Jun 2023 12:41) (84 - 89)  BP: 124/71 (08 Jun 2023 12:41) (102/53 - 135/67)  BP(mean): --  RR: 17 (08 Jun 2023 12:41) (17 - 18)  SpO2: 98% (08 Jun 2023 12:41) (95% - 98%)    Parameters below as of 08 Jun 2023 05:30  Patient On (Oxygen Delivery Method): room air        CONSTITUTIONAL: NAD, well-developed  RESPIRATORY: Normal respiratory effort; lungs are clear to auscultation bilaterally  CARDIOVASCULAR: Regular rate and rhythm, normal S1 and S2, no murmur/rub/gallop; No lower extremity edema; Peripheral pulses are 2+ bilaterally  ABDOMEN: Nontender to palpation, normoactive bowel sounds, no rebound/guarding; No hepatosplenomegaly  MUSCLOSKELETAL: no clubbing or cyanosis of digits; no joint swelling or tenderness to palpation  PSYCH: A+O to person, place, and time; affect appropriate  SKIN: No rash     LABS:                        10.1   9.58  )-----------( 330      ( 08 Jun 2023 03:40 )             31.4     06-08    143  |  106  |  11  ----------------------------<  84  3.6   |  25  |  0.58    Ca    8.5      08 Jun 2023 03:40  Phos  3.8     06-08  Mg     2.00     06-08    TPro  6.4  /  Alb  3.2<L>  /  TBili  0.5  /  DBili  x   /  AST  141<H>  /  ALT  133<H>  /  AlkPhos  129<H>  06-08      CARDIAC MARKERS ( 07 Jun 2023 03:23 )  x     / x     / 16 U/L / x     / x                RADIOLOGY & ADDITIONAL TESTS:  Results Reviewed:   Imaging Personally Reviewed:  Electrocardiogram Personally Reviewed:    COORDINATION OF CARE:  Care Discussed with Consultants/Other Providers [Y/N]:  Prior or Outpatient Records Reviewed [Y/N]:

## 2023-06-08 NOTE — PROGRESS NOTE ADULT - PROBLEM SELECTOR PLAN 4
Worsening chronic transaminitis  Reports elevated liver enzymes in "200s" since February, which were investigated by her gastroenterologist   -APAP level neg.  -Abd US showing fatty liver  -EBV Ag was positive and likely old infection, EBV PCR negative   -alpha-1 anti-trypsin (phenotype) 212, AFP 9.4   -MAYELA neg, anti-smooth muscle antibody titer high   -IR consulted, s/p liver biopsy 6/5 and awaiting results   -Outpatient hepatology f/up

## 2023-06-08 NOTE — PROGRESS NOTE ADULT - PROBLEM SELECTOR PROBLEM 1
Sepsis
Still's disease
Still's disease
Systemic inflammatory response syndrome (SIRS)
Still's disease
Sepsis
Still's disease
Systemic inflammatory response syndrome (SIRS)
Systemic inflammatory response syndrome (SIRS)
Sepsis
Still's disease
Still's disease

## 2023-06-08 NOTE — PROGRESS NOTE ADULT - PROBLEM SELECTOR PLAN 6
Questionable UTI  - UA on 5/19 with pyuria; but denies dysuria  - US with mild right hydroureter  - Urine culture without growth  - c/w ceftriaxone till 5/25 as per ID
Questionable UTI  - UA on 5/19 with pyuria; but denies dysuria  - US with mild right hydroureter  - Urine culture without growth  - s/p ceftriaxone till 5/25 as per ID
Questionable UTI  - UA on 5/19 with pyuria; but denies dysuria  - US with mild right hydroureter  - Urine culture without growth  - s/p ceftriaxone till 5/25 as per ID
Likely hypovolemic hyponatremia, improved   -Monitor sodium daily   -TSH: WNL  -s/p IVF, encourage PO intake
Likely hypovolemic hyponatremia, improved   -Monitor sodium daily   -TSH: WNL  -s/p IVF, encourage PO intake
Questionable UTI  - UA on 5/19 with pyuria; but denies dysuria  - US with mild right hydroureter  - Urine culture without growth  - s/p ceftriaxone till 5/25 as per ID
Likely hypovolemic hyponatremia, improved   -Monitor sodium daily   -TSH: WNL  -s/p IVF, encourage PO intake
Questionable UTI  - UA on 5/19 with pyuria; but denies dysuria  - US with mild right hydroureter  - Urine culture without growth  - s/p ceftriaxone till 5/25 as per ID
Questionable UTI  - UA on 5/19 with pyuria; but denies dysuria  - US with mild right hydroureter  - Urine culture without growth  - c/w ceftriaxone till 5/25 as per ID
Questionable UTI  - UA on 5/19 with pyuria; but denies dysuria  - US with mild right hydroureter  - Urine culture without growth  - s/p ceftriaxone till 5/25 as per ID
Likely hypovolemic hyponatremia, improved   -Monitor sodium daily   -TSH: WNL  -s/p IVF, encourage PO intake
Questionable UTI  - UA on 5/19 with pyuria; but denies dysuria  - US with mild right hydroureter  - Urine culture without growth  - s/p ceftriaxone till 5/25 as per ID
Due to GI losses;  Supplement K aggressively   Monitor lytes and renal function
Due to GI losses;  Supplement K prn   Monitor lytes and renal function
Questionable UTI  - UA on 5/19 with pyuria; but denies dysuria  - US with mild right hydroureter  - Urine culture without growth  - s/p ceftriaxone till 5/25 as per ID
Likely hypovolemic hyponatremia, improved   -Monitor sodium daily   -TSH: WNL  -s/p IVF, encourage PO intake
Due to GI losses;  Cont. to replete potassium - IV/PO.  -Optimize Mg to at least 2 - mg sulfate ordered.  Telemetry until potassium normalized   Monitor lytes and renal function
Questionable UTI  - UA on 5/19 with pyuria; but denies dysuria  - US with mild right hydroureter  - Urine culture without growth  - s/p ceftriaxone till 5/25 as per ID
Likely hypovolemic hyponatremia, improved   -Monitor sodium daily   -TSH: WNL  -s/p IVF, encourage PO intake

## 2023-06-08 NOTE — PHARMACOTHERAPY INTERVENTION NOTE - COMMENTS
Discharge medications reviewed with the patient. Outpatient medication schedule was discussed in detail including: medication name, indication, dose, administration times, treatment duration, side effects, drug interactions, and special instructions. Patient questions and concerns were answered and addressed. Patient demonstrated understanding. Informed patient that medication list may change prior to discharge. Patient provided with educational handout.    Frederic Levy PharmD  Clinical Pharmacy Specialist  [id number]

## 2023-06-08 NOTE — PROGRESS NOTE ADULT - PROBLEM SELECTOR PROBLEM 8
HTN (hypertension)
Hypokalemia
HTN (hypertension)
HTN (hypertension)
Hypokalemia
HTN (hypertension)
Hypokalemia
MS (multiple sclerosis)
MS (multiple sclerosis)
HTN (hypertension)
Hypokalemia
HTN (hypertension)
Hypokalemia
MS (multiple sclerosis)
Hypokalemia
HTN (hypertension)
HTN (hypertension)

## 2023-06-08 NOTE — PROGRESS NOTE ADULT - PROBLEM SELECTOR PLAN 9
c/w Lyrica  resumed Amitriptyline  MRI brain as above
c/w Lyrica  resumed Amitriptyline  MRI brain as above
c/w Lyrica  c/w Amitriptyline  MRI brain as above
c/w Lyrica  resumed Amitriptyline  MRI brain as above
Heparin sq
Relatively hypotensive given that on triple agent regimen; Initial SBP in 80s mmHg; s/p Midodrine in ED  -BP improved, cont to monitor off BP meds.
Relatively hypotensive given that on triple agent regimen; Initial SBP in 80s mmHg; s/p Midodrine in ED  -BP improved, cont to monitor off BP meds.
Heparin sq
c/w Lyrica  resumed Amitriptyline  MRI brain as above
c/w Lyrica  resumed Amitriptyline  MRI brain as above
Relatively hypotensive given that on triple agent regimen; Initial SBP in 80s mmHg; s/p Midodrine in ED  -BP improved, cont to monitor off BP meds.
c/w Lyrica  resumed Amitriptyline
Relatively hypotensive given that on triple agent regimen; Initial SBP in 80s mmHg; s/p Midodrine in ED  -BP improved, cont to monitor off BP meds.
c/w Lyrica  resumed Amitriptyline  MRI brain as above
Heparin sq
c/w Lyrica  resumed Amitriptyline  MRI brain as above
c/w Lyrica  resumed Amitriptyline
Relatively hypotensive given that on triple agent regimen; Initial SBP in 80s mmHg; s/p Midodrine in ED  -BP improved, cont to monitor off BP meds.
Relatively hypotensive given that on triple agent regimen; Initial SBP in 80s mmHg; s/p Midodrine in ED  -BP improved, cont to monitor off BP meds.

## 2023-06-08 NOTE — PROGRESS NOTE ADULT - PROBLEM SELECTOR PROBLEM 6
Abnormal urine
Hyponatremia
Hyponatremia
Abnormal urine
Hyponatremia
Abnormal urine
Hyponatremia
Abnormal urine
Hypokalemia
Hypokalemia
Hyponatremia
Hypokalemia
Abnormal urine
Hyponatremia
Abnormal urine

## 2023-06-08 NOTE — PROGRESS NOTE ADULT - REASON FOR ADMISSION
Fever, nausea, vomiting

## 2023-06-08 NOTE — PROGRESS NOTE ADULT - PROBLEM SELECTOR PLAN 11
Heparin sq    Plan discussed with ACP
Heparin sq
Heparin sq    Plan discussed with ACP
Heparin sq
Heparin sq    DC [place], d/c time 38 minutes
Heparin sq

## 2023-06-08 NOTE — DISCHARGE NOTE NURSING/CASE MANAGEMENT/SOCIAL WORK - NURSING SECTION COMPLETE
Patient/Caregiver provided printed discharge information. Alar Island Pedicle Flap Text: The defect edges were debeveled with a #15 scalpel blade.  Given the location of the defect, shape of the defect and the proximity to the alar rim an island pedicle advancement flap was deemed most appropriate.  Using a sterile surgical marker, an appropriate advancement flap was drawn incorporating the defect, outlining the appropriate donor tissue and placing the expected incisions within the nasal ala running parallel to the alar rim. The area thus outlined was incised with a #15 scalpel blade.  The skin margins were undermined minimally to an appropriate distance in all directions around the primary defect and laterally outward around the island pedicle utilizing iris scissors.  There was minimal undermining beneath the pedicle flap.

## 2023-06-08 NOTE — PROGRESS NOTE ADULT - PROBLEM SELECTOR PLAN 8
Supplement K prn   Monitor lytes and renal function
Relatively hypotensive given that on triple agent regimen; Initial SBP in 80s mmHg; s/p Midodrine in ED  -BP improved, cont to monitor off BP meds.
Supplement K prn   Monitor lytes and renal function
Relatively hypotensive given that on triple agent regimen; Initial SBP in 80s mmHg; s/p Midodrine in ED  -BP improved, cont to monitor off BP meds.
Supplement K prn   Monitor lytes and renal function
c/w Lyrica  May resume Amitriptyline
Supplement K prn   Monitor lytes and renal function
Supplement K prn   Monitor lytes and renal function
Relatively hypotensive given that on triple agent regimen; Initial SBP in 80s mmHg; s/p Midodrine in ED  -BP improved, cont to monitor off BP meds.
Relatively hypotensive given that on triple agent regimen; Initial SBP in 80s mmHg; s/p Midodrine in ED  -BP improved, cont to monitor off BP meds.
Supplement K prn   Monitor lytes and renal function
Relatively hypotensive given that on triple agent regimen; Initial SBP in 80s mmHg; s/p Midodrine in ED  -BP improved, cont to monitor off BP meds.
Relatively hypotensive given that on triple agent regimen; Initial SBP in 80s mmHg; s/p Midodrine in ED  -BP improved, cont to monitor off BP meds.
c/w Lyrica  Hold Amitriptyline - can resum eif she remains stable. Re-eval in AM.
Relatively hypotensive given that on triple agent regimen; Initial SBP in 80s mmHg; s/p Midodrine in ED  -BP improved, cont to monitor off BP meds.
Relatively hypotensive given that on triple agent regimen; Initial SBP in 80s mmHg; s/p Midodrine in ED  -BP improved, cont to monitor off BP meds.
c/w Lyrica  resumed Amitriptyline    Will contact neurologist regarding recent MRI brain report

## 2023-06-08 NOTE — PROGRESS NOTE ADULT - PROBLEM SELECTOR PROBLEM 10
Encounter for deep vein thrombosis (DVT) prophylaxis
MS (multiple sclerosis)
MS (multiple sclerosis)
Encounter for deep vein thrombosis (DVT) prophylaxis
MS (multiple sclerosis)
MS (multiple sclerosis)
Encounter for deep vein thrombosis (DVT) prophylaxis
MS (multiple sclerosis)
MS (multiple sclerosis)

## 2023-06-08 NOTE — PROGRESS NOTE ADULT - PROBLEM SELECTOR PROBLEM 5
Acute UTI
Hyponatremia
Hyponatremia
Tachycardia
Abnormal urine
Hyponatremia
Tachycardia
Hyponatremia
Tachycardia
Tachycardia
Hyponatremia
Tachycardia
Hyponatremia
Abnormal urine
Tachycardia
Hyponatremia

## 2023-06-08 NOTE — PROGRESS NOTE ADULT - PROBLEM SELECTOR PLAN 1
Rheumatology and Dermatology consulted  -s/p IV steroids, now transitioned to methylprednisolone 40mg PO daily w/ GI prophylaxis   -plan to taper steroids as outpatient   -f/u w/ Rheum   -Xrays of hands w/ variable degree bilateral DIP arthritic changes some with erosive OA component   -IGA elevated, salmeron ab neg  -syphilis neg, HIV neg  -s/p liver bx 6/5, f/up path

## 2023-06-08 NOTE — PROGRESS NOTE ADULT - PROBLEM SELECTOR PLAN 5
Likely hypovolemic hyponatremia, improved   -Monitor sodium daily   -TSH: WNL  -s/p IVF, encourage PO intake
-TTE w/ grossly normal left ventricular systolic function, normal RV   -d-dimer 947   -CTA chest with no PE  -monitor HR, improved
Likely hypovolemic hyponatremia, improved   -Monitor sodium daily   -TSH: WNL  -s/p IVF, encourage PO intake
Likely hypovolemic hyponatremia, improved   -Monitor sodium daily   -TSH: WNL  -s/p IVF, encourage PO intake
UTI ruled out   - denies urinary symptoms and urine culture NGTD   - Urine Chlamydia/GC JAGJIT  ordered
Likely hypovolemic hyponatremia   -Monitor sodium daily   -TSH: WNL  -Continue IVF, encourage PO intake
Possible UTI given UA c/w infection; Vague abd symptoms;   -Already on Zosyn IV   -f/u UCx and BCx   -Urine Chlamydia/GC JAGJIT  ordered --> not yet sent, but low suspicion.  -Prob GI infection, rather than  based on clinical status.
-TTE w/ grossly normal left ventricular systolic function, normal RV   -d-dimer 947   - CTA chest with no PE  -monitor HR  dc tele
Likely hypovolemic hyponatremia, improved   -Monitor sodium daily   -TSH: WNL  -s/p IVF, encourage PO intake
-TTE w/ grossly normal left ventricular systolic function, normal RV   -d-dimer 947   -CTA chest with no PE  -monitor HR
-TTE w/ grossly normal left ventricular systolic function, normal RV   -d-dimer 947   -CTA chest with no PE  -monitor HR
Likely hypovolemic hyponatremia   -Monitor sodium daily   -TSH: WNL  -s/p IVF, encourage PO intake
-TTE w/ grossly normal left ventricular systolic function, normal RV   -d-dimer 947   - CTA chest with no PE  -monitor HR  dc tele
-TTE w/ grossly normal left ventricular systolic function, normal RV   -d-dimer 947   -CTA chest with no PE  -monitor HR, improved
Questionable UTI  - UA on 5/19 with pyuria; but denies dysuria  - US with mild right hydroureter  - Urine culture without growth  - c/w ceftriaxone till 5/25 as per ID
Likely hypovolemic hyponatremia   -Monitor sodium daily   -TSH: WNL  -s/p IVF, encourage PO intake
Likely hypovolemic hyponatremia, improved   -Monitor sodium daily   -TSH: WNL  -s/p IVF, encourage PO intake

## 2023-06-08 NOTE — PROGRESS NOTE ADULT - PROBLEM SELECTOR PLAN 7
Supplement K prn   Monitor lytes and renal function
Relatively hypotensive given that on triple agent regimen; Initial SBP in 80s mmHg; s/p Midodrine in ED  -BP improved, cont to monitor off BP meds.
Relatively hypotensive given that on triple agent regimen; Initial SBP in 80s mmHg; s/p Midodrine in ED  -BP improved, cont to monitor off BP meds.
Questionable UTI  - UA on 5/19 with pyuria; but denies dysuria  - US with mild right hydroureter  - Urine culture without growth  - s/p ceftriaxone till 5/25 as per ID
Supplement K prn   Monitor lytes and renal function
Questionable UTI  - UA on 5/19 with pyuria; but denies dysuria  - US with mild right hydroureter  - Urine culture without growth  - s/p ceftriaxone till 5/25 as per ID
Supplement K prn   Monitor lytes and renal function
Questionable UTI  - UA on 5/19 with pyuria; but denies dysuria  - US with mild right hydroureter  - Urine culture without growth  - s/p ceftriaxone till 5/25 as per ID
Questionable UTI  - UA on 5/19 with pyuria; but denies dysuria  - US with mild right hydroureter  - Urine culture without growth  - s/p ceftriaxone till 5/25 as per ID
Relatively hypotensive given that on triple agent regimen; Initial SBP in 80s mmHg; s/p Midodrine in ED  -BP much better. Cont to monitor off BP meds.
Due to GI losses  Supplement K prn   Monitor lytes and renal function
Questionable UTI  - UA on 5/19 with pyuria; but denies dysuria  - US with mild right hydroureter  - Urine culture without growth  - s/p ceftriaxone till 5/25 as per ID
Due to GI losses  Supplement K prn   Monitor lytes and renal function
Questionable UTI  - UA on 5/19 with pyuria; but denies dysuria  - US with mild right hydroureter  - Urine culture without growth  - s/p ceftriaxone till 5/25 as per ID
yes

## 2023-06-08 NOTE — PROGRESS NOTE ADULT - PROBLEM SELECTOR PROBLEM 2
Elevated ferritin
Gastroenteritis, infectious
Systemic inflammatory response syndrome (SIRS)
Elevated ferritin
Gastroenteritis, infectious
Elevated ferritin
Gastroenteritis, infectious
Systemic inflammatory response syndrome (SIRS)
Systemic inflammatory response syndrome (SIRS)
Elevated ferritin
Systemic inflammatory response syndrome (SIRS)
Systemic inflammatory response syndrome (SIRS)
Elevated ferritin
Systemic inflammatory response syndrome (SIRS)

## 2023-06-08 NOTE — PROGRESS NOTE ADULT - PROBLEM SELECTOR PROBLEM 4
Abnormal transaminases
Abnormal transaminases
Tachycardia
Abnormal transaminases
Hyponatremia
Abnormal transaminases
Abnormal transaminases
Tachycardia
Abnormal transaminases
Tachycardia
Abnormal transaminases
Hyponatremia
Tachycardia
Hyponatremia
Abnormal transaminases
Tachycardia

## 2023-06-08 NOTE — DISCHARGE NOTE NURSING/CASE MANAGEMENT/SOCIAL WORK - NSDCPEFALRISK_GEN_ALL_CORE
For information on Fall & Injury Prevention, visit: [email or web address] OR  [email or web address] OR  https://www.cdc.gov/steadi/patient.html

## 2023-06-08 NOTE — PROGRESS NOTE ADULT - PROBLEM SELECTOR PLAN 3
Elevated Ferritin w/ bicytopenia, elevated LFT's, and persistent fevers. Likely d/t acute inflammation, ruled out HLH   -Ferritin 62827,   -cont to monitor ferritin  -triglyceride levels 205, Fibrinogen wnl   -s/p bone marrow biopsy 5/25, BM Biopsy did not show definitive morphologic or immunophenotypic evidence of hemophagocytosis. PB flow WNL

## 2023-06-12 PROBLEM — I10 ESSENTIAL (PRIMARY) HYPERTENSION: Chronic | Status: ACTIVE | Noted: 2023-05-19

## 2023-06-21 LAB — SURGICAL PATHOLOGY STUDY: SIGNIFICANT CHANGE UP

## 2023-06-22 ENCOUNTER — LABORATORY RESULT (OUTPATIENT)
Age: 58
End: 2023-06-22

## 2023-06-22 ENCOUNTER — APPOINTMENT (OUTPATIENT)
Dept: INTERNAL MEDICINE | Facility: CLINIC | Age: 58
End: 2023-06-22
Payer: MEDICARE

## 2023-06-22 VITALS
OXYGEN SATURATION: 95 % | SYSTOLIC BLOOD PRESSURE: 109 MMHG | TEMPERATURE: 98.1 F | HEIGHT: 66 IN | HEART RATE: 104 BPM | WEIGHT: 184 LBS | BODY MASS INDEX: 29.57 KG/M2 | DIASTOLIC BLOOD PRESSURE: 71 MMHG | RESPIRATION RATE: 12 BRPM

## 2023-06-22 DIAGNOSIS — Z00.00 ENCOUNTER FOR GENERAL ADULT MEDICAL EXAMINATION W/OUT ABNORMAL FINDINGS: ICD-10-CM

## 2023-06-22 DIAGNOSIS — G35 MULTIPLE SCLEROSIS: ICD-10-CM

## 2023-06-22 PROCEDURE — 99396 PREV VISIT EST AGE 40-64: CPT

## 2023-06-22 RX ORDER — METOPROLOL SUCCINATE 100 MG/1
100 TABLET, EXTENDED RELEASE ORAL
Qty: 180 | Refills: 3 | Status: DISCONTINUED | COMMUNITY
Start: 2022-05-06 | End: 2023-06-22

## 2023-06-22 RX ORDER — AMLODIPINE BESYLATE 10 MG/1
10 TABLET ORAL
Refills: 0 | Status: DISCONTINUED | COMMUNITY
End: 2023-06-22

## 2023-06-22 RX ORDER — PREGABALIN 75 MG/1
75 CAPSULE ORAL
Refills: 0 | Status: DISCONTINUED | COMMUNITY
End: 2023-06-22

## 2023-06-22 RX ORDER — HYDROCHLOROTHIAZIDE 25 MG/1
25 TABLET ORAL DAILY
Qty: 90 | Refills: 3 | Status: DISCONTINUED | COMMUNITY
Start: 2022-05-06 | End: 2023-06-22

## 2023-06-22 NOTE — ASSESSMENT
[FreeTextEntry1] : Physical \par \par Has upcoming appt for Mammo July 9th and GYN Uuly 7th\par \par s/p Hospitalization\par hospital discharge papers and labs reviewed\par \par Elevated LFT's \par Has appt with hepatology next week \par \par HTN\par cont Metoprolol 100 mg daily \par cont Lisinopril 40 mg daily \par Reinforced the importance of following a low sugar/low carbohydrate diet\par \par History of MS \par Follows with Neurology every 5-6 months\par cont current medications \par \par blood work ordered\par \par follow up in one week for lab results\par \par forms filled out

## 2023-06-22 NOTE — HISTORY OF PRESENT ILLNESS
[de-identified] : \par Ms. WIN MIRANDA is a 58 year old female, with hx of MS, HTN, presents for annual physical\par \par She is s/p recent  hospitalization at [facility] for about 21 days for sepsis/fever of unknown origin. Was discharged on 6/8/23\par \par Currently she denies any urinary symptoms fevers, chills, chest pain , shortness of breath, N/V or abdominal pain \par \par She reports while in the hospital her liver enzymes where elevated and states has appt with hepatology next week

## 2023-06-22 NOTE — PHYSICAL EXAM
[No Acute Distress] : no acute distress [Normal Sclera/Conjunctiva] : normal sclera/conjunctiva [EOMI] : extraocular movements intact [Normal Outer Ear/Nose] : the outer ears and nose were normal in appearance [Normal Oropharynx] : the oropharynx was normal [No JVD] : no jugular venous distention [No Lymphadenopathy] : no lymphadenopathy [Supple] : supple [Thyroid Normal, No Nodules] : the thyroid was normal and there were no nodules present [No Respiratory Distress] : no respiratory distress  [No Accessory Muscle Use] : no accessory muscle use [Clear to Auscultation] : lungs were clear to auscultation bilaterally [Normal Rate] : normal rate  [Regular Rhythm] : with a regular rhythm [Normal S1, S2] : normal S1 and S2 [No Murmur] : no murmur heard [No Edema] : there was no peripheral edema [No Extremity Clubbing/Cyanosis] : no extremity clubbing/cyanosis [Soft] : abdomen soft [Non Tender] : non-tender [Non-distended] : non-distended [No Masses] : no abdominal mass palpated [No HSM] : no HSM [Normal Bowel Sounds] : normal bowel sounds [Normal Posterior Cervical Nodes] : no posterior cervical lymphadenopathy [Normal Anterior Cervical Nodes] : no anterior cervical lymphadenopathy [No CVA Tenderness] : no CVA  tenderness [No Spinal Tenderness] : no spinal tenderness [No Joint Swelling] : no joint swelling [Grossly Normal Strength/Tone] : grossly normal strength/tone [No Rash] : no rash [Coordination Grossly Intact] : coordination grossly intact [No Focal Deficits] : no focal deficits [Normal Affect] : the affect was normal [Normal Insight/Judgement] : insight and judgment were intact [PERRL] : pupils equal round and reactive to light [Normal TMs] : both tympanic membranes were normal [de-identified] : Generalized weakness h/o MS  [de-identified] : Ambulates with walker

## 2023-06-22 NOTE — HEALTH RISK ASSESSMENT
[Good] : ~his/her~  mood as  good [Yes] : Yes [None] : None [With Family] : lives with family [] :  [Never] : Never [Monthly or less (1 pt)] : Monthly or less (1 point) [Never (0 pts)] : Never (0 points) [0] : 2) Feeling down, depressed, or hopeless: Not at all (0) [PHQ-2 Negative - No further assessment needed] : PHQ-2 Negative - No further assessment needed [de-identified] : Occasionally  [[id number]] : 0 [MammogramDate] : 2018 [MammogramComments] : Has upcoming appt  [PapSmearDate] : 2022 [PapSmearComments] : Has upcoming appt  [ColonoscopyDate] : Never

## 2023-06-23 LAB
ANTI PM-SCL-100 PLUS: <20 UNITS — SIGNIFICANT CHANGE UP
ANTI-SAE 1 IGG: <20 UNITS — SIGNIFICANT CHANGE UP
ANTI-SS-A 52 KD AB, IGG PLUS: <20 UNITS — SIGNIFICANT CHANGE UP
ANTI-U1-RNP AB PLUS: <20 UNITS — SIGNIFICANT CHANGE UP
EJ MYOMARKER3 PLUS: NEGATIVE — SIGNIFICANT CHANGE UP
ENA JO1 AB SER IA-ACNC: <20 UNITS — SIGNIFICANT CHANGE UP
FIBRILLARIN (U3 RNP) PLUS: NEGATIVE — SIGNIFICANT CHANGE UP
KU MYOMARKER3 PLUS: NEGATIVE — SIGNIFICANT CHANGE UP
MDA5 (P140)(CADM-140) PLUS: <20 UNITS — SIGNIFICANT CHANGE UP
MI-2 PLUS: NEGATIVE — SIGNIFICANT CHANGE UP
NXP-2 (P140) MYOPLUS: <20 UNITS — SIGNIFICANT CHANGE UP
OJ MYOMARKER3 PLUS: NEGATIVE — SIGNIFICANT CHANGE UP
PL-12 PLUS: NEGATIVE — SIGNIFICANT CHANGE UP
PL-7 PLUS: NEGATIVE — SIGNIFICANT CHANGE UP
SRP MYOMARKER3 PLUS: NEGATIVE — SIGNIFICANT CHANGE UP
TIF GAMMA (P155/140) PLUS: <20 UNITS — SIGNIFICANT CHANGE UP
U2 SNRNP PLUS: NEGATIVE — SIGNIFICANT CHANGE UP

## 2023-06-27 ENCOUNTER — APPOINTMENT (OUTPATIENT)
Dept: DERMATOLOGY | Facility: CLINIC | Age: 58
End: 2023-06-27
Payer: MEDICARE

## 2023-06-27 DIAGNOSIS — L21.9 SEBORRHEIC DERMATITIS, UNSPECIFIED: ICD-10-CM

## 2023-06-27 DIAGNOSIS — L30.9 DERMATITIS, UNSPECIFIED: ICD-10-CM

## 2023-06-27 DIAGNOSIS — L40.8 OTHER PSORIASIS: ICD-10-CM

## 2023-06-27 PROCEDURE — 99214 OFFICE O/P EST MOD 30 MIN: CPT | Mod: GC

## 2023-06-27 RX ORDER — HYDROCORTISONE 25 MG/G
2.5 OINTMENT TOPICAL
Qty: 1 | Refills: 3 | Status: ACTIVE | COMMUNITY
Start: 2023-06-27 | End: 1900-01-01

## 2023-06-27 RX ORDER — KETOCONAZOLE 20 MG/G
2 CREAM TOPICAL
Qty: 1 | Refills: 3 | Status: ACTIVE | COMMUNITY
Start: 2023-06-27 | End: 1900-01-01

## 2023-06-30 ENCOUNTER — APPOINTMENT (OUTPATIENT)
Dept: CARDIOLOGY | Facility: CLINIC | Age: 58
End: 2023-06-30
Payer: MEDICARE

## 2023-06-30 ENCOUNTER — NON-APPOINTMENT (OUTPATIENT)
Age: 58
End: 2023-06-30

## 2023-06-30 VITALS
WEIGHT: 187 LBS | RESPIRATION RATE: 14 BRPM | DIASTOLIC BLOOD PRESSURE: 61 MMHG | HEIGHT: 66 IN | OXYGEN SATURATION: 98 % | HEART RATE: 97 BPM | BODY MASS INDEX: 30.05 KG/M2 | SYSTOLIC BLOOD PRESSURE: 100 MMHG

## 2023-06-30 LAB
25(OH)D3 SERPL-MCNC: 49.5 NG/ML
ALBUMIN SERPL ELPH-MCNC: 3.7 G/DL
ALP BLD-CCNC: 107 U/L
ALT SERPL-CCNC: 120 U/L
ANION GAP SERPL CALC-SCNC: 12 MMOL/L
APPEARANCE: ABNORMAL
AST SERPL-CCNC: 96 U/L
BILIRUB SERPL-MCNC: 0.5 MG/DL
BILIRUBIN URINE: ABNORMAL
BLOOD URINE: ABNORMAL
BUN SERPL-MCNC: 7 MG/DL
CALCIUM SERPL-MCNC: 9.2 MG/DL
CHLORIDE SERPL-SCNC: 103 MMOL/L
CHOLEST SERPL-MCNC: 226 MG/DL
CO2 SERPL-SCNC: 25 MMOL/L
COLOR: NORMAL
CREAT SERPL-MCNC: 0.58 MG/DL
EGFR: 105 ML/MIN/1.73M2
ESTIMATED AVERAGE GLUCOSE: 140 MG/DL
GLUCOSE QUALITATIVE U: NEGATIVE MG/DL
GLUCOSE SERPL-MCNC: 120 MG/DL
HBA1C MFR BLD HPLC: 6.5 %
HDLC SERPL-MCNC: 54 MG/DL
KETONES URINE: ABNORMAL MG/DL
LDLC SERPL CALC-MCNC: 136 MG/DL
LEUKOCYTE ESTERASE URINE: ABNORMAL
NITRITE URINE: NEGATIVE
NONHDLC SERPL-MCNC: 172 MG/DL
PH URINE: 6
POTASSIUM SERPL-SCNC: 4.4 MMOL/L
PROT SERPL-MCNC: 7.4 G/DL
PROTEIN URINE: 30 MG/DL
SODIUM SERPL-SCNC: 140 MMOL/L
SPECIFIC GRAVITY URINE: 1.02
TRIGL SERPL-MCNC: 179 MG/DL
TSH SERPL-ACNC: 1.67 UIU/ML
UROBILINOGEN URINE: 1 MG/DL
VIT B12 SERPL-MCNC: 1367 PG/ML

## 2023-06-30 PROCEDURE — 99214 OFFICE O/P EST MOD 30 MIN: CPT | Mod: 25

## 2023-06-30 PROCEDURE — 93000 ELECTROCARDIOGRAM COMPLETE: CPT

## 2023-06-30 NOTE — DISCUSSION/SUMMARY
[FreeTextEntry1] : Yessi 56yo lady with a PMH of MS - on twice a year infusion, and HTN.\par At [facility] with septic shock/renal and hepatic failure/hyponatremia and hypokalemia.\par Now resolved but long hospital course.\par Only on metoprolol and lisinopril w/ stable BPs\par C/o persistent tachycardia and palpitations.\par EKG sinus 99bpm.... tachy at baseline and up to 130s \par Echo and stress test normal in [place] 2 months ago. \par Echo normal at [facility] last month\par Some LE edema; was on diuretic but held since d/c.\par \par Will follow edema... may need to restart diuretic\par Holter monitor to eval tachycardia [EKG obtained to assist in diagnosis and management of assessed problem(s)] : EKG obtained to assist in diagnosis and management of assessed problem(s)

## 2023-06-30 NOTE — HISTORY OF PRESENT ILLNESS
[FreeTextEntry1] : Yessi 56yo lady with a PMH of MS (not on meds at the moment but seeing neuro and starting soon), and HTN.\par Meds from [place]:\par Metoprolol succ 100AM / 50PM.\par Triplixan: \par -Perindopril\par -Indapamide\par -Amlodipine\par \par SBPs initially very variable, but seem to have settled with this cocktail.\par No UE and LE edema however.\par EKG sinus tach 104bpm.... tachy at baseline\par Echo and stress test normal in [facility] 2 months ago. \par \par 6/30/23:\par At [facility] with septic shock/renal and hepatic failure/hyponatremia and hypokalemia.\par Now resolved but long hospital course.\par Only on metoprolol and lisinopril w/ stable BPs\par C/o persistent tachycardia and palpitations.

## 2023-07-13 ENCOUNTER — APPOINTMENT (OUTPATIENT)
Dept: RHEUMATOLOGY | Facility: CLINIC | Age: 58
End: 2023-07-13

## 2023-07-14 DIAGNOSIS — R82.90 UNSPECIFIED ABNORMAL FINDINGS IN URINE: ICD-10-CM

## 2023-07-26 ENCOUNTER — APPOINTMENT (OUTPATIENT)
Dept: HEPATOLOGY | Facility: CLINIC | Age: 58
End: 2023-07-26
Payer: MEDICARE

## 2023-07-26 VITALS — HEART RATE: 62 BPM | SYSTOLIC BLOOD PRESSURE: 149 MMHG | DIASTOLIC BLOOD PRESSURE: 81 MMHG | OXYGEN SATURATION: 97 %

## 2023-07-26 DIAGNOSIS — R74.8 ABNORMAL LEVELS OF OTHER SERUM ENZYMES: ICD-10-CM

## 2023-07-26 DIAGNOSIS — Z80.9 FAMILY HISTORY OF MALIGNANT NEOPLASM, UNSPECIFIED: ICD-10-CM

## 2023-07-26 DIAGNOSIS — K74.00 HEPATIC FIBROSIS, UNSPECIFIED: ICD-10-CM

## 2023-07-26 DIAGNOSIS — I10 ESSENTIAL (PRIMARY) HYPERTENSION: ICD-10-CM

## 2023-07-26 PROCEDURE — 99204 OFFICE O/P NEW MOD 45 MIN: CPT

## 2023-07-27 LAB
ALBUMIN SERPL ELPH-MCNC: 4.3 G/DL
ALP BLD-CCNC: 95 U/L
ALT SERPL-CCNC: 127 U/L
ANION GAP SERPL CALC-SCNC: 14 MMOL/L
AST SERPL-CCNC: 116 U/L
BILIRUB SERPL-MCNC: 0.4 MG/DL
BUN SERPL-MCNC: 8 MG/DL
CALCIUM SERPL-MCNC: 9.7 MG/DL
CHLORIDE SERPL-SCNC: 103 MMOL/L
CO2 SERPL-SCNC: 24 MMOL/L
CREAT SERPL-MCNC: 0.59 MG/DL
EGFR: 104 ML/MIN/1.73M2
GLUCOSE SERPL-MCNC: 122 MG/DL
INR PPP: 0.99 RATIO
POTASSIUM SERPL-SCNC: 4.3 MMOL/L
PROT SERPL-MCNC: 7.3 G/DL
PT BLD: 11.2 SEC
SODIUM SERPL-SCNC: 141 MMOL/L

## 2023-07-27 NOTE — ASSESSMENT
[FreeTextEntry1] : 58F with acute hepatitis of unclear etiology - biopsy suggestive of DILI but there was still significant inflammation\par F3 disease on biopsy\par \par patient has since stopped steroids - will repeat labs\par Will also check Hep B Core and Hep B DNA as that was not checked in hospital\par There is likely underlying fatty liver\par \par Would consider re-challenging with steroids if labs do not continue to improve\par Given F3 disease would repeat imaging in about 6 months but would also check Fibrsocan in future to see if fibrosis has improved\par \par There is also a possibility of repeating the liver biopsy\par \par Will schedule Fibroscan next month at [facility]

## 2023-07-27 NOTE — PHYSICAL EXAM
[General Appearance - Alert] : alert [Sclera] : the sclera and conjunctiva were normal [PERRL With Normal Accommodation] : pupils were equal in size, round, and reactive to light [Extraocular Movements] : extraocular movements were intact [Auscultation Breath Sounds / Voice Sounds] : lungs were clear to auscultation bilaterally [Heart Rate And Rhythm] : heart rate was normal and rhythm regular [Heart Sounds] : normal S1 and S2 [Heart Sounds Gallop] : no gallops [Murmurs] : no murmurs [Heart Sounds Pericardial Friction Rub] : no pericardial rub [Bowel Sounds] : normal bowel sounds [Abdomen Soft] : soft [Abdomen Tenderness] : non-tender [Abdomen Mass (___ Cm)] : no abdominal mass palpated [Abnormal Walk] : normal gait [Nail Clubbing] : no clubbing  or cyanosis of the fingernails [Musculoskeletal - Swelling] : no joint swelling seen [Motor Tone] : muscle strength and tone were normal [Skin Color & Pigmentation] : normal skin color and pigmentation [Skin Turgor] : normal skin turgor [] : no rash [Deep Tendon Reflexes (DTR)] : deep tendon reflexes were 2+ and symmetric [Sensation] : the sensory exam was normal to light touch and pinprick [No Focal Deficits] : no focal deficits [Oriented To Time, Place, And Person] : oriented to person, place, and time [Impaired Insight] : insight and judgment were intact [Affect] : the affect was normal [FreeTextEntry1] : walking with walker

## 2023-07-27 NOTE — HISTORY OF PRESENT ILLNESS
[FreeTextEntry1] : 58 yrs old female w/ Hx of HTN, MS c/o 3 days of generalized weakness, fever and chills with associated nbnb vomiting and diarrhea. Reported that her diarrhea is resolved, no BM overnight. But still has nausea and\par chills. Reported that she has chronic elevation of AST/ALT in 200s, since last year, which peaked to 700s in Dec 2022. \par \par Was seen by GI in [place], who reported that the liver enzymes are elevated due to genetic condition but was also told she had fatty liver. Drinks one beer per week,  no herbal supplements or herbal tea, uses vitamin B12 and D3 supplements. No new medications, only on Ocrevus infusion every 6 months, last one in 2/2023. No recent travel or sick contacts. On admission, febrile, and tachycardic, hypotensive, responded to fluids. Labs show AST/ALT 700s/600s with normal t. bili and alk phos, hepatocellular injury. Hepatology consulted for elevated liver enzymes.\par \par Patient with extensive workup in hospital\par Consideration for ?Stills and other rheumatic disease\par Liver bx in hospital noted below with significant inflammation and F3 disease \par \par Final Diagnosis\par 1.  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  The inflammatory infiltrate is composed of lymphocytes,\par prominent neutrophils and few plasma cells.  The portal tracts are\par expanded by bile ductular proliferation and inflammation. Confluent\par necro-inflammation is present with prominent Ayala hyaline and\par ballooning changes. Few bile ducts are identified with evidence\par of injury.  Macrovesicular steatosis approximately 20% is present.\par Neutrophils are noted in the lobules surrounding the hepatocytes.\par There is no evidence of granuloma, florid duct lesion or sclerosing duct\par lesion.\par \par No iron deposition or diastase resistant PAS-positive globules are\par identified. Trichrome highlights expanded portal tracts and bridging\par fibrosis.  Reticulin stain highlights the hepatocyte hyperplasia and a\par nodular architecture.\par The findings are consistent with moderate to severe hepatocellular injury\par pattern likely due to toxic/drug-induced used injury, in the absence of\par serological evidence of viral and autoimmune markers.\par Slides are reviewed for  at the Gastrointestinal /\par Hepatobiliary consensus conference on and intradepartmental consensus was\par obtained.\par \par Since hospital D/C - patient appears to be feeling better\par Liver tests still elevated but improved from initial presentation\par She is still ambulating with a walker\par She denies any confusion\par Denies any bleeding\par Denies any jaundice\par She was seen by Derm \par She has not seen Rheum yet\par \par

## 2023-07-28 LAB
HBV CORE IGG+IGM SER QL: NONREACTIVE
HEPB DNA PCR INT: NOT DETECTED
HEPB DNA PCR LOG: NOT DETECTED LOGIU/ML

## 2023-08-04 ENCOUNTER — APPOINTMENT (OUTPATIENT)
Dept: CARDIOLOGY | Facility: CLINIC | Age: 58
End: 2023-08-04
Payer: MEDICARE

## 2023-08-04 VITALS
OXYGEN SATURATION: 95 % | TEMPERATURE: 97.6 F | RESPIRATION RATE: 14 BRPM | HEART RATE: 102 BPM | SYSTOLIC BLOOD PRESSURE: 155 MMHG | DIASTOLIC BLOOD PRESSURE: 84 MMHG

## 2023-08-04 DIAGNOSIS — R00.2 PALPITATIONS: ICD-10-CM

## 2023-08-04 DIAGNOSIS — I10 ESSENTIAL (PRIMARY) HYPERTENSION: ICD-10-CM

## 2023-08-04 DIAGNOSIS — R00.0 TACHYCARDIA, UNSPECIFIED: ICD-10-CM

## 2023-08-04 PROCEDURE — 93000 ELECTROCARDIOGRAM COMPLETE: CPT

## 2023-08-04 PROCEDURE — 99214 OFFICE O/P EST MOD 30 MIN: CPT | Mod: 25

## 2023-08-04 NOTE — HISTORY OF PRESENT ILLNESS
[FreeTextEntry1] : Ysesi 58yo lady with a PMH of MS (not on meds at the moment but seeing neuro and starting soon), and HTN. Meds from [place]: Metoprolol succ 100AM / 50PM. Triplixan:  -Perindopril -Indapamide -Amlodipine  SBPs initially very variable, but seem to have settled with this cocktail. No UE and LE edema however. EKG sinus tach 104bpm.... tachy at baseline Echo and stress test normal in [facility] 2 months ago.   6/30/23: At [facility] with septic shock/renal and hepatic failure/hyponatremia and hypokalemia. Now resolved but long hospital course. Only on metoprolol and lisinopril w/ stable BPs C/o persistent tachycardia and palpitations.  8/4/23: Holter w/ short SVTs 10-14beats w/ HRs 160-170s HRs 81-170s; avg 100bpm

## 2023-08-04 NOTE — DISCUSSION/SUMMARY
[FreeTextEntry1] : Yessi 56yo lady with a PMH of MS - on twice a year infusion, and HTN. At [facility] with septic shock/renal and hepatic failure/hyponatremia and hypokalemia. Now resolved but long hospital course. Only on metoprolol and lisinopril w/ stable BPs C/o persistent tachycardia and palpitations. EKG sinus 99bpm.... tachy at baseline and up to 130s  Echo and stress test normal in [facility] 2 months ago.  Echo normal at [facility] last month Some LE edema; was on diuretic but held since d/c. Holter w/ short SVTs 10-14beats w/ HRs 160-170s HRs 81-170s; avg 100bpm SBPs 140-150s -increase metoprolol to 100mg AM; 50mg PM -monitor HRs... f/u in 4-6 weeks [EKG obtained to assist in diagnosis and management of assessed problem(s)] : EKG obtained to assist in diagnosis and management of assessed problem(s)

## 2023-09-12 ENCOUNTER — APPOINTMENT (OUTPATIENT)
Dept: HEPATOLOGY | Facility: CLINIC | Age: 58
End: 2023-09-12

## 2023-10-16 LAB
APPEARANCE: CLEAR
BACTERIA UR CULT: NORMAL
BACTERIA: ABNORMAL /HPF
BILIRUBIN URINE: NEGATIVE
BLOOD URINE: ABNORMAL
CAST: 0 /LPF
COLOR: YELLOW
EPITHELIAL CELLS: 10 /HPF
GLUCOSE QUALITATIVE U: NEGATIVE MG/DL
KETONES URINE: NEGATIVE MG/DL
LEUKOCYTE ESTERASE URINE: ABNORMAL
MICROSCOPIC-UA: NORMAL
NITRITE URINE: NEGATIVE
PH URINE: 6
PROTEIN URINE: NEGATIVE MG/DL
RED BLOOD CELLS URINE: 4 /HPF
SPECIFIC GRAVITY URINE: 1.01
UROBILINOGEN URINE: 0.2 MG/DL
WHITE BLOOD CELLS URINE: 16 /HPF

## 2024-04-15 ENCOUNTER — APPOINTMENT (OUTPATIENT)
Dept: INTERNAL MEDICINE | Facility: CLINIC | Age: 59
End: 2024-04-15
Payer: MEDICARE

## 2024-04-15 VITALS
BODY MASS INDEX: 33.59 KG/M2 | HEIGHT: 66 IN | OXYGEN SATURATION: 96 % | SYSTOLIC BLOOD PRESSURE: 126 MMHG | HEART RATE: 86 BPM | RESPIRATION RATE: 19 BRPM | WEIGHT: 209 LBS | DIASTOLIC BLOOD PRESSURE: 81 MMHG

## 2024-04-15 PROCEDURE — 99214 OFFICE O/P EST MOD 30 MIN: CPT

## 2024-04-15 RX ORDER — PREGABALIN 75 MG/1
75 CAPSULE ORAL
Refills: 0 | Status: ACTIVE | COMMUNITY

## 2024-04-15 RX ORDER — PERINDOPRIL ERBUMINE 8 MG/1
8 TABLET ORAL
Refills: 0 | Status: DISCONTINUED | COMMUNITY
End: 2024-04-15

## 2024-04-15 RX ORDER — INDAPAMIDE 2.5 MG/1
2.5 TABLET, FILM COATED ORAL
Refills: 0 | Status: DISCONTINUED | COMMUNITY
End: 2024-04-15

## 2024-04-15 RX ORDER — OCRELIZUMAB 300 MG/10ML
300 INJECTION INTRAVENOUS
Refills: 0 | Status: ACTIVE | COMMUNITY

## 2024-05-03 RX ORDER — METOPROLOL SUCCINATE 50 MG/1
50 TABLET, EXTENDED RELEASE ORAL
Qty: 270 | Refills: 3 | Status: ACTIVE | COMMUNITY
Start: 1900-01-01 | End: 1900-01-01

## 2024-05-03 RX ORDER — LISINOPRIL 20 MG/1
20 TABLET ORAL DAILY
Qty: 90 | Refills: 3 | Status: ACTIVE | COMMUNITY
Start: 2022-05-06 | End: 1900-01-01
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
## 2024-05-27 NOTE — ADDENDUM
[FreeTextEntry1] : I, Jenny Woods, am scribing for and in the presence of Dr. Yessica Sánchez, DO in the following sections HISTORY OF PRESENT ILLNESS, PAST MEDICAL/FAMILY/SOCIAL HISTORY; REVIEW OF SYSTEMS; VITAL SIGNS; PHYSICAL EXAM; ASSESSMENT/PLAN on 04/15/2024.   I personally performed the services described in the documentation, reviewed the documentation recorded by the scribe in my presence, and it accurately and completely records my words and actions.

## 2024-05-27 NOTE — REVIEW OF SYSTEMS
[Negative] : Heme/Lymph [FreeTextEntry9] : Hx of MS [de-identified] : Hx of MS [de-identified] : Hx of anxiety disorder

## 2024-05-27 NOTE — ASSESSMENT
[FreeTextEntry1] : Follow up  Recently diagnosed with DM II /weight gain discussed importance of following a low sugar/low carb diet and increased physical activity follows with endocrinology -has appointment later today   History of MS cont Ocrevus 300mg IV every 6 months Follows with Neurology   HTN cont Metoprolol 100 mg AM and 50mg PM daily cont Lisinopril 20 mg daily Reinforced the importance of following a low sugar/low carbohydrate diet, weight loss, and increased physical activity as tolerated follows with cardiology -last visit was August 4  Hx of anxiety disorder cont Amitriptyline 50mg

## 2024-05-27 NOTE — HISTORY OF PRESENT ILLNESS
[de-identified] : Ms. WIN MIARNDA is a 59-year-old female with hx. of MS, HTN, and anxiety disorder, who presents for a follow up visit.   Pt. states she was diagnosed with DM II and osteoarthritis one month ago by her endocrinologist. Has not been started on any medication, she has an endocrinology appointment today in which they will discuss starting medication  Pt. states she has gained 25 lbs since 06/2023. She has been immobile d/t MS.

## 2024-05-27 NOTE — PHYSICAL EXAM
[Well Nourished] : well nourished [Well Developed] : well developed [Well-Appearing] : well-appearing [Normal Sclera/Conjunctiva] : normal sclera/conjunctiva [Normal Outer Ear/Nose] : the outer ears and nose were normal in appearance [No JVD] : no jugular venous distention [No Lymphadenopathy] : no lymphadenopathy [Supple] : supple [No Respiratory Distress] : no respiratory distress  [No Accessory Muscle Use] : no accessory muscle use [Clear to Auscultation] : lungs were clear to auscultation bilaterally [Normal Rate] : normal rate  [Regular Rhythm] : with a regular rhythm [Normal S1, S2] : normal S1 and S2 [No Murmur] : no murmur heard [Pedal Pulses Present] : the pedal pulses are present [No Edema] : there was no peripheral edema [No Extremity Clubbing/Cyanosis] : no extremity clubbing/cyanosis [Soft] : abdomen soft [Non Tender] : non-tender [Non-distended] : non-distended [Normal Posterior Cervical Nodes] : no posterior cervical lymphadenopathy [Normal Anterior Cervical Nodes] : no anterior cervical lymphadenopathy [No CVA Tenderness] : no CVA  tenderness [No Spinal Tenderness] : no spinal tenderness [No Joint Swelling] : no joint swelling [Grossly Normal Strength/Tone] : grossly normal strength/tone [No Rash] : no rash [Coordination Grossly Intact] : coordination grossly intact [No Focal Deficits] : no focal deficits [Normal Affect] : the affect was normal [Normal Insight/Judgement] : insight and judgment were intact [de-identified] : Pt in tears [No Acute Distress] : no acute distress [Normal Bowel Sounds] : normal bowel sounds [de-identified] : ambulates with walker

## 2024-10-15 ENCOUNTER — APPOINTMENT (OUTPATIENT)
Dept: INTERNAL MEDICINE | Facility: CLINIC | Age: 59
End: 2024-10-15
Payer: MEDICARE

## 2024-10-15 ENCOUNTER — LABORATORY RESULT (OUTPATIENT)
Age: 59
End: 2024-10-15

## 2024-10-15 VITALS
HEART RATE: 77 BPM | BODY MASS INDEX: 34.23 KG/M2 | OXYGEN SATURATION: 96 % | RESPIRATION RATE: 17 BRPM | DIASTOLIC BLOOD PRESSURE: 81 MMHG | SYSTOLIC BLOOD PRESSURE: 120 MMHG | TEMPERATURE: 97.5 F | WEIGHT: 213 LBS | HEIGHT: 66 IN

## 2024-10-15 DIAGNOSIS — M51.369: ICD-10-CM

## 2024-10-15 DIAGNOSIS — E11.9 TYPE 2 DIABETES MELLITUS W/OUT COMPLICATIONS: ICD-10-CM

## 2024-10-15 DIAGNOSIS — Z00.00 ENCOUNTER FOR GENERAL ADULT MEDICAL EXAMINATION W/OUT ABNORMAL FINDINGS: ICD-10-CM

## 2024-10-15 DIAGNOSIS — E11.628 TYPE 2 DIABETES MELLITUS WITH OTHER SKIN COMPLICATIONS: ICD-10-CM

## 2024-10-15 PROCEDURE — G0444 DEPRESSION SCREEN ANNUAL: CPT | Mod: 59

## 2024-10-15 PROCEDURE — 99396 PREV VISIT EST AGE 40-64: CPT

## 2024-10-15 RX ORDER — TIRZEPATIDE 2.5 MG/.5ML
2.5 INJECTION, SOLUTION SUBCUTANEOUS WEEKLY
Refills: 0 | Status: ACTIVE | COMMUNITY

## 2024-10-16 LAB
ALBUMIN SERPL ELPH-MCNC: 4.4 G/DL
ALP BLD-CCNC: 97 U/L
ALT SERPL-CCNC: 117 U/L
ANION GAP SERPL CALC-SCNC: 13 MMOL/L
APPEARANCE: ABNORMAL
AST SERPL-CCNC: 83 U/L
BILIRUB SERPL-MCNC: 0.5 MG/DL
BILIRUBIN URINE: ABNORMAL
BLOOD URINE: NEGATIVE
BUN SERPL-MCNC: 11 MG/DL
CALCIUM SERPL-MCNC: 9.3 MG/DL
CHLORIDE SERPL-SCNC: 102 MMOL/L
CHOLEST SERPL-MCNC: 245 MG/DL
CO2 SERPL-SCNC: 28 MMOL/L
COLOR: NORMAL
CREAT SERPL-MCNC: 0.69 MG/DL
EGFR: 100 ML/MIN/1.73M2
ESTIMATED AVERAGE GLUCOSE: 154 MG/DL
GLUCOSE QUALITATIVE U: 100 MG/DL
GLUCOSE SERPL-MCNC: 104 MG/DL
HBA1C MFR BLD HPLC: 7 %
HDLC SERPL-MCNC: 61 MG/DL
KETONES URINE: ABNORMAL MG/DL
LDLC SERPL CALC-MCNC: 156 MG/DL
LEUKOCYTE ESTERASE URINE: ABNORMAL
NITRITE URINE: NEGATIVE
NONHDLC SERPL-MCNC: 184 MG/DL
PH URINE: 5.5
POTASSIUM SERPL-SCNC: 4.2 MMOL/L
PROT SERPL-MCNC: 7 G/DL
PROTEIN URINE: NORMAL MG/DL
SODIUM SERPL-SCNC: 144 MMOL/L
SPECIFIC GRAVITY URINE: 1.02
TRIGL SERPL-MCNC: 152 MG/DL
UROBILINOGEN URINE: 1 MG/DL

## 2024-10-18 LAB
25(OH)D3 SERPL-MCNC: 58.9 NG/ML
BASOPHILS # BLD AUTO: 0.05 K/UL
BASOPHILS NFR BLD AUTO: 0.8 %
CELIACPAN: NORMAL
EOSINOPHIL # BLD AUTO: 0.09 K/UL
EOSINOPHIL NFR BLD AUTO: 1.4 %
HCT VFR BLD CALC: 47.3 %
HGB BLD-MCNC: 15.6 G/DL
IMM GRANULOCYTES NFR BLD AUTO: 0.3 %
LYMPHOCYTES # BLD AUTO: 2.63 K/UL
LYMPHOCYTES NFR BLD AUTO: 40.2 %
MAN DIFF?: NORMAL
MCHC RBC-ENTMCNC: 31.5 PG
MCHC RBC-ENTMCNC: 33 GM/DL
MCV RBC AUTO: 95.4 FL
MONOCYTES # BLD AUTO: 0.92 K/UL
MONOCYTES NFR BLD AUTO: 14 %
NEUTROPHILS # BLD AUTO: 2.84 K/UL
NEUTROPHILS NFR BLD AUTO: 43.3 %
PLATELET # BLD AUTO: 175 K/UL
RBC # BLD: 4.96 M/UL
RBC # FLD: 14.3 %
TSH SERPL-ACNC: 2.68 UIU/ML
VIT B12 SERPL-MCNC: 786 PG/ML
WBC # FLD AUTO: 6.55 K/UL

## 2024-10-21 LAB
CELIAC DISEASE INTERPRETATION: NORMAL
CELIAC GENE PAIRS PRESENT: NO
DQ ALPHA 1: NORMAL
DQ BETA 1: NORMAL
IMMUNOGLOBULIN A (IGA): 201 MG/DL

## 2024-11-01 ENCOUNTER — APPOINTMENT (OUTPATIENT)
Dept: GASTROENTEROLOGY | Facility: CLINIC | Age: 59
End: 2024-11-01

## 2024-11-18 ENCOUNTER — TRANSCRIPTION ENCOUNTER (OUTPATIENT)
Age: 59
End: 2024-11-18

## 2024-12-07 ENCOUNTER — TRANSCRIPTION ENCOUNTER (OUTPATIENT)
Age: 59
End: 2024-12-07

## 2024-12-07 DIAGNOSIS — E78.00 PURE HYPERCHOLESTEROLEMIA, UNSPECIFIED: ICD-10-CM

## 2025-05-23 NOTE — ED ADULT NURSE NOTE - CINV DISCH TEACH PARTICIP
Thank you for your visit with Dr Yap today.     Please follow up in 2 weeks .    Complete voiding trial on 5/27    Reach out over [employer] or call for any questions/concerns or to view results of ordered tests.       If you need a refill on your prescription, please call your pharmacy and let them know. Please be proactive and call before your medication runs out. The pharmacy will then contact us for the refill. Please allow 24-48 hours for the refill to be processed.      If Dr. Yap has ordered additional laboratory or radiology testing to be done before your next scheduled office visit, those results will be discussed with you at that upcoming visit, DO NOT CALL OFFICE FOR RESULTS.   This will allow you the opportunity to go over the results in person with Dr. Yap.   If your results require immediate intervention, you will be contacted sooner by phone call.        Want to Say “Thank You” to a Nurse?  The CJ Award® was created in memory of NOHEILA Dennison by his family to say thank you to bedside nurses who provide an outstanding level of care.  Submit a nomination using any method below.     OR    [email or web address]        
Patient

## 2025-09-17 ENCOUNTER — NON-APPOINTMENT (OUTPATIENT)
Age: 60
End: 2025-09-17

## 2025-09-19 ENCOUNTER — APPOINTMENT (OUTPATIENT)
Dept: GASTROENTEROLOGY | Facility: CLINIC | Age: 60
End: 2025-09-19
Payer: MEDICARE

## 2025-09-19 VITALS
DIASTOLIC BLOOD PRESSURE: 84 MMHG | RESPIRATION RATE: 17 BRPM | HEIGHT: 66 IN | TEMPERATURE: 97.5 F | BODY MASS INDEX: 26.36 KG/M2 | SYSTOLIC BLOOD PRESSURE: 131 MMHG | HEART RATE: 83 BPM | OXYGEN SATURATION: 97 % | WEIGHT: 164 LBS

## 2025-09-19 DIAGNOSIS — G35 MULTIPLE SCLEROSIS: ICD-10-CM

## 2025-09-19 DIAGNOSIS — Z12.11 ENCOUNTER FOR SCREENING FOR MALIGNANT NEOPLASM OF COLON: ICD-10-CM

## 2025-09-19 DIAGNOSIS — E11.9 TYPE 2 DIABETES MELLITUS W/OUT COMPLICATIONS: ICD-10-CM

## 2025-09-19 PROCEDURE — 99214 OFFICE O/P EST MOD 30 MIN: CPT

## 2025-09-19 PROCEDURE — G2211 COMPLEX E/M VISIT ADD ON: CPT

## 2025-09-19 RX ORDER — AMITRIPTYLINE HYDROCHLORIDE 25 MG/1
25 TABLET, FILM COATED ORAL
Refills: 0 | Status: ACTIVE | COMMUNITY

## 2025-09-19 RX ORDER — TIRZEPATIDE 15 MG/.5ML
15 INJECTION, SOLUTION SUBCUTANEOUS
Refills: 0 | Status: ACTIVE | COMMUNITY

## 2025-09-19 RX ORDER — PREGABALIN 75 MG/1
75 CAPSULE ORAL
Refills: 0 | Status: ACTIVE | COMMUNITY

## 2025-09-19 RX ORDER — SODIUM PICOSULFATE, MAGNESIUM OXIDE, AND ANHYDROUS CITRIC ACID 12; 3.5; 1 G/175ML; G/175ML; MG/175ML
10-3.5-12 MG-GM LIQUID ORAL TWICE DAILY
Qty: 2 | Refills: 0 | Status: ACTIVE | COMMUNITY
Start: 2025-09-19 | End: 1900-01-01